# Patient Record
Sex: FEMALE | Race: BLACK OR AFRICAN AMERICAN | NOT HISPANIC OR LATINO | Employment: OTHER | URBAN - METROPOLITAN AREA
[De-identification: names, ages, dates, MRNs, and addresses within clinical notes are randomized per-mention and may not be internally consistent; named-entity substitution may affect disease eponyms.]

---

## 2017-03-22 ENCOUNTER — OFFICE VISIT (OUTPATIENT)
Dept: INTERNAL MEDICINE | Facility: CLINIC | Age: 62
End: 2017-03-22

## 2017-03-22 VITALS
TEMPERATURE: 98.2 F | OXYGEN SATURATION: 99 % | WEIGHT: 158 LBS | SYSTOLIC BLOOD PRESSURE: 130 MMHG | HEIGHT: 63 IN | HEART RATE: 70 BPM | DIASTOLIC BLOOD PRESSURE: 80 MMHG | BODY MASS INDEX: 28 KG/M2

## 2017-03-22 DIAGNOSIS — M79.604 LEG PAIN, BILATERAL: Primary | ICD-10-CM

## 2017-03-22 DIAGNOSIS — M79.605 LEG PAIN, BILATERAL: Primary | ICD-10-CM

## 2017-03-22 DIAGNOSIS — M79.7 FIBROMYALGIA: ICD-10-CM

## 2017-03-22 DIAGNOSIS — R39.9 UTI SYMPTOMS: Primary | ICD-10-CM

## 2017-03-22 DIAGNOSIS — R39.9 UTI SYMPTOMS: ICD-10-CM

## 2017-03-22 LAB
BILIRUB BLD-MCNC: NEGATIVE MG/DL
CLARITY, POC: CLEAR
COLOR UR: YELLOW
GLUCOSE UR STRIP-MCNC: NEGATIVE MG/DL
KETONES UR QL: NEGATIVE
LEUKOCYTE EST, POC: NEGATIVE
NITRITE UR-MCNC: NEGATIVE MG/ML
PH UR: 7 [PH] (ref 5–8)
PROT UR STRIP-MCNC: NEGATIVE MG/DL
RBC # UR STRIP: ABNORMAL /UL
SP GR UR: 1.02 (ref 1–1.03)
UROBILINOGEN UR QL: NORMAL

## 2017-03-22 PROCEDURE — 81003 URINALYSIS AUTO W/O SCOPE: CPT | Performed by: INTERNAL MEDICINE

## 2017-03-22 PROCEDURE — 99214 OFFICE O/P EST MOD 30 MIN: CPT | Performed by: INTERNAL MEDICINE

## 2017-03-22 RX ORDER — PREGABALIN 75 MG/1
75 CAPSULE ORAL 3 TIMES DAILY
Qty: 90 CAPSULE | Refills: 3 | OUTPATIENT
Start: 2017-03-22 | End: 2018-03-13 | Stop reason: SDUPTHER

## 2017-03-22 RX ORDER — MELOXICAM 7.5 MG/1
7.5 TABLET ORAL DAILY
Qty: 10 TABLET | Refills: 0 | Status: SHIPPED | OUTPATIENT
Start: 2017-03-22 | End: 2017-04-01

## 2017-03-22 NOTE — PROGRESS NOTES
"neck, back pain (bilateral leg aches, nausea ) and Urinary Tract Infection (odor, )      HPI  Bernice Dunlap is a 61 y.o. female RTC in acute care:   \"I just started hurting from my neck down to mid back\".  Pain is front and back.  Legs started hurting as well, diffusely and bilaterally.  Was at work, nothing acute occurred. No fall or trauma.  Pain is \"just an ache\" and is all over. Cannot point to one location.  Pain got severe. Tried antacid and seemed to help a bit and has not pain today.  Did not take antacid until Sunday and it did help some.  Was not having any overt heartburn. Notes \"it is not hurting today\".  Legs still hurt today, both legs. No weakness in legs.   Had to sit in meeting at work yesterday for 1 1/2 hours and was \"almost in tears\".  Took Tylenol and did help.  Did start to take Lyrica BID in last few days as was only taking once daily (pt did not initially admit this not \"not change\" in any of her meds). No other new med issues.     Also notes that urine \"is more yellow than normal\", darker than normal.  Has an odor to it, \"not a bad odor\".  No pain with urination.  Feels like has some mild frequency, but admits occasionally has some coffee that may contribute.     No ETOH, no drugs at all.   Was doing a lot of walking, but less in last few days due to pain.    Feels like stress level is pretty good, no major stress issues.       Review of Systems   Constitution: Negative for chills and fever.   HENT: Negative for congestion, hoarse voice, odynophagia and sore throat.    Respiratory: Negative for cough.    Skin: Positive for rash (exema on hands is back, NO other rashes noted).   Musculoskeletal: Positive for back pain and joint pain. Negative for falls, joint swelling and muscle weakness.   Gastrointestinal: Negative for dysphagia and heartburn.   Genitourinary: Positive for frequency (mild, coffee induced). Negative for dysuria and hematuria.   Neurological: Negative for focal weakness. " "      The following portions of the patient's history were reviewed and updated as appropriate: allergies, current medications, past medical history and problem list.      Current Outpatient Prescriptions:   •  ASPIRIN PO, Take by mouth., Disp: , Rfl:   •  cholecalciferol (VITAMIN D3) 1000 UNITS tablet, Take 1 tablet by mouth Daily., Disp: 30 tablet, Rfl: 5  •  metoprolol succinate XL (TOPROL-XL) 50 MG 24 hr tablet, Take 1 tablet by mouth daily., Disp: 90 tablet, Rfl: 3  •  Pitavastatin Calcium 4 MG tablet, Take 1 tablet by mouth every night., Disp: 90 tablet, Rfl: 3  •  pregabalin (LYRICA) 75 MG capsule, Take 1 capsule by mouth 3 (Three) Times a Day., Disp: 90 capsule, Rfl: 3  •  vitamin D (ERGOCALCIFEROL) 14670 UNITS capsule capsule, Take 1 capsule by mouth 1 (One) Time Per Week., Disp: 12 capsule, Rfl: 0  •  meloxicam (MOBIC) 7.5 MG tablet, Take 1 tablet by mouth Daily for 10 days., Disp: 10 tablet, Rfl: 0    Vitals:    03/22/17 0832   BP: 130/80   Pulse: 70   Temp: 98.2 °F (36.8 °C)   SpO2: 99%   Weight: 158 lb (71.7 kg)   Height: 63\" (160 cm)         Physical Exam   Constitutional: She is oriented to person, place, and time. She appears well-developed and well-nourished. She does not have a sickly appearance. She does not appear ill. No distress.   HENT:   Head: Normocephalic and atraumatic.   Mouth/Throat: Oropharynx is clear and moist. No oropharyngeal exudate.   Eyes: EOM are normal. Pupils are equal, round, and reactive to light.   Neck: Normal range of motion. Neck supple.   Cardiovascular: Normal rate, regular rhythm, normal heart sounds and intact distal pulses.  Exam reveals no gallop and no friction rub.    No murmur heard.  Pulmonary/Chest: Effort normal and breath sounds normal. No tachypnea. She has no decreased breath sounds. She has no wheezes. She has no rhonchi. She exhibits tenderness (over sternum with noted keloid).   Abdominal: Soft. Bowel sounds are normal. She exhibits no distension and no " mass. There is no tenderness. There is no rebound and no guarding.   Musculoskeletal: She exhibits no edema.        Right knee: She exhibits normal range of motion and no swelling.        Left knee: She exhibits normal range of motion and no swelling.   Diffuse TTP in muscles over thighs and calves B  Tender point noted at B lateral epicondyles, B upper back at neck, B medial knees, sternum   Neurological: She is alert and oriented to person, place, and time. No cranial nerve deficit. She exhibits normal muscle tone. Gait (walks easily unassisted through office carrying purse. ) normal.   Skin: No rash noted.   Psychiatric: She has a normal mood and affect. Her behavior is normal. Thought content normal.   Vitals reviewed.      Results for orders placed or performed in visit on 03/22/17   POC Urinalysis Dipstick, Automated   Result Value Ref Range    Color Yellow Yellow, Straw, Dark Yellow, Shaista    Clarity, UA Clear Clear    Glucose, UA Negative Negative, 1000 mg/dL (3+) mg/dL    Bilirubin Negative Negative    Ketones, UA Negative Negative    Specific Gravity  1.025 1.005 - 1.030    Blood, UA Trace (A) Negative    pH, Urine 7.0 5.0 - 8.0    Protein, POC Negative Negative mg/dL    Urobilinogen, UA Normal Normal    Leukocytes Negative Negative    Nitrite, UA Negative Negative       Assessment/ Plan  Diagnoses and all orders for this visit:    Leg pain, bilateral  -     meloxicam (MOBIC) 7.5 MG tablet; Take 1 tablet by mouth Daily for 10 days.  -     CK  -     CBC & Differential  -     Comprehensive Metabolic Panel  -     C-reactive Protein  -     Sedimentation Rate    UTI symptoms  -     POC Urinalysis Dipstick, Automated  -     Urinalysis With Microscopic    Fibromyalgia  -     pregabalin (LYRICA) 75 MG capsule; Take 1 capsule by mouth 3 (Three) Times a Day.  -     CK  -     CBC & Differential  -     Comprehensive Metabolic Panel  -     C-reactive Protein  -     Sedimentation Rate        Return for Next scheduled  follow up.      Discussion:  Bernice Dunlap is a 61 y.o. female RTC In acute care with sudden onset of neck to mid thoracic chest and back pain along with B diffuse leg pain.  Pain and exam with noted tender points is more consistent with flare of fibromyalgia, though pt cannot determine inciting event.  Pt complaints are a bit out of proportion to exam today which does support more of myofascial pain etiology.  I have asked pt to increase her Lyrica to TID at this point and will give her a short course of low dose daily NSAID to see if we can get her on the downside of pain pattern. Pt must restart graded exercise as she was doing. Will check above labs to look for secondary causes of sx.    Urine odor - non-specific sx. Udip equivocal. Will send out microscopy today with culture.      RTC as scehduled.

## 2017-03-24 LAB
ALBUMIN SERPL-MCNC: 4.3 G/DL (ref 3.5–5.2)
ALBUMIN/GLOB SERPL: 1.5 G/DL
ALP SERPL-CCNC: 66 U/L (ref 39–117)
ALT SERPL-CCNC: 12 U/L (ref 1–33)
AST SERPL-CCNC: 16 U/L (ref 1–32)
BACTERIA UR CULT: NO GROWTH
BACTERIA UR CULT: NORMAL
BASOPHILS # BLD AUTO: 0.03 10*3/MM3 (ref 0–0.2)
BASOPHILS NFR BLD AUTO: 0.5 % (ref 0–1.5)
BILIRUB SERPL-MCNC: 0.5 MG/DL (ref 0.1–1.2)
BUN SERPL-MCNC: 14 MG/DL (ref 8–23)
BUN/CREAT SERPL: 25.5 (ref 7–25)
CALCIUM SERPL-MCNC: 10 MG/DL (ref 8.6–10.5)
CHLORIDE SERPL-SCNC: 100 MMOL/L (ref 98–107)
CK SERPL-CCNC: 102 U/L (ref 20–180)
CO2 SERPL-SCNC: 26.5 MMOL/L (ref 22–29)
CREAT SERPL-MCNC: 0.55 MG/DL (ref 0.57–1)
CRP SERPL-MCNC: 0.51 MG/DL (ref 0–0.5)
EOSINOPHIL # BLD AUTO: 0.16 10*3/MM3 (ref 0–0.7)
EOSINOPHIL NFR BLD AUTO: 2.6 % (ref 0.3–6.2)
ERYTHROCYTE [DISTWIDTH] IN BLOOD BY AUTOMATED COUNT: 12.5 % (ref 11.7–13)
ERYTHROCYTE [SEDIMENTATION RATE] IN BLOOD BY WESTERGREN METHOD: 18 MM/HR (ref 0–30)
GLOBULIN SER CALC-MCNC: 2.9 GM/DL
GLUCOSE SERPL-MCNC: 76 MG/DL (ref 65–99)
HCT VFR BLD AUTO: 39.6 % (ref 35.6–45.5)
HGB BLD-MCNC: 13.4 G/DL (ref 11.9–15.5)
IMM GRANULOCYTES # BLD: 0 10*3/MM3 (ref 0–0.03)
IMM GRANULOCYTES NFR BLD: 0 % (ref 0–0.5)
LYMPHOCYTES # BLD AUTO: 3.01 10*3/MM3 (ref 0.9–4.8)
LYMPHOCYTES NFR BLD AUTO: 49.2 % (ref 19.6–45.3)
MCH RBC QN AUTO: 29.9 PG (ref 26.9–32)
MCHC RBC AUTO-ENTMCNC: 33.8 G/DL (ref 32.4–36.3)
MCV RBC AUTO: 88.4 FL (ref 80.5–98.2)
MONOCYTES # BLD AUTO: 0.41 10*3/MM3 (ref 0.2–1.2)
MONOCYTES NFR BLD AUTO: 6.7 % (ref 5–12)
NEUTROPHILS # BLD AUTO: 2.51 10*3/MM3 (ref 1.9–8.1)
NEUTROPHILS NFR BLD AUTO: 41 % (ref 42.7–76)
PLATELET # BLD AUTO: 352 10*3/MM3 (ref 140–500)
POTASSIUM SERPL-SCNC: 4.7 MMOL/L (ref 3.5–5.2)
PROT SERPL-MCNC: 7.2 G/DL (ref 6–8.5)
RBC # BLD AUTO: 4.48 10*6/MM3 (ref 3.9–5.2)
SODIUM SERPL-SCNC: 139 MMOL/L (ref 136–145)
WBC # BLD AUTO: 6.12 10*3/MM3 (ref 4.5–10.7)

## 2017-06-23 ENCOUNTER — OFFICE VISIT (OUTPATIENT)
Dept: CARDIOLOGY | Facility: CLINIC | Age: 62
End: 2017-06-23

## 2017-06-23 VITALS
WEIGHT: 154 LBS | SYSTOLIC BLOOD PRESSURE: 120 MMHG | HEART RATE: 67 BPM | OXYGEN SATURATION: 98 % | HEIGHT: 63 IN | DIASTOLIC BLOOD PRESSURE: 82 MMHG | BODY MASS INDEX: 27.29 KG/M2

## 2017-06-23 DIAGNOSIS — Z95.1 S/P CABG X 1: ICD-10-CM

## 2017-06-23 DIAGNOSIS — I70.90 ARTERIOSCLEROTIC VASCULAR DISEASE: Primary | ICD-10-CM

## 2017-06-23 PROCEDURE — 99213 OFFICE O/P EST LOW 20 MIN: CPT | Performed by: PHYSICIAN ASSISTANT

## 2017-06-23 PROCEDURE — 93000 ELECTROCARDIOGRAM COMPLETE: CPT | Performed by: PHYSICIAN ASSISTANT

## 2017-06-23 NOTE — PROGRESS NOTES
Date of Office Visit: 2017  Encounter Provider: JUSTYNA Martin  Place of Service: [facility]  Patient Name: Bernice Dunlap  :1955    Chief Complaint   Patient presents with   • Hypertension     1 year follow up   :     HPI: Bernice Dunlap is a 62 y.o. female , new to me, who presents today for follow-up.  Old records have been obtained and reviewed by me.  She is a patient of Dr. Contreras's with a past medical history significant for hyperlipidemia, hypertension, carotid artery disease, and coronary artery disease.  She had a myocardial infarction in  and is status post CABG ×1 and 2008.  She was last in our office to see Dr. Contreras on 2016.  At that visit, she complained of some atypical chest pain.  Because of her complaints, he had her walk on a treadmill and she developed severe chest discomfort.  He then ordered a Cardiolite stress test.  This was a normal study with no evidence of ischemia.   Over the past year she's been doing well.  She had an episode a couple of weeks ago that she thinks was heartburn.  She drank a Coke and then felt a pressure in her chest like she had to burp.  That night, she had some burning in her chest, and all of this resolved by the next day.  She walks regularly without difficulty.  She does have some fibromyalgia, and this is bothersome to her.      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 Mammary artery to the LAD; 2007; nl stress in 2014   • HYSTERECTOMY     • SALPINGO OOPHORECTOMY Bilateral    • THORACOSCOPY      With Pleurodesis (Therapeutic) - 2012 after traumatic effusion; Dr. Tomas       Social History     Social History   • Marital status:      Spouse name: N/A   • Number of children: 1   • Years of education: N/A     Occupational History   • Not on file.     Social History Main Topics   • Smoking status: Former Smoker   • Smokeless tobacco: Not on file      Comment: 6 pk/yr hx; occasionally smokes now; 2015 has quit totally   • Alcohol use No   • Drug use: No   • Sexual activity: No      Comment:  in past     Other Topics Concern   • Not on file     Social History Narrative    Diet - overall healthy, \"a little bit better\", eating fruits and veggies    Exercise - walking daily.        Family History   Problem Relation Age of Onset   • Heart disease Mother      Arteriosclerotic cardiovascular disease; dx'd in 50's   • Alcohol abuse Father      Alcoholism   • COPD Father    • Depression Sister    • Leukemia Sister    • Diabetes Sister      Type 2 Diabetes Mellitus   • Alcohol abuse Brother      Alcoholism   • Hypertension Brother      Benign Essential Hypertension   • Glaucoma Brother    • Hyperlipidemia Brother    • No Known Problems Maternal Grandmother    • No Known Problems Maternal Grandfather    • No Known Problems Paternal Grandmother    • No Known Problems Paternal Grandfather        Review of Systems   Constitution: Negative for chills, fever, malaise/fatigue, weight gain and weight loss.   HENT: Negative for ear pain, headaches, hearing loss, nosebleeds and sore throat.    Eyes: Negative for double vision, pain and visual " "disturbance.   Cardiovascular: Negative for chest pain, dyspnea on exertion, irregular heartbeat, leg swelling, near-syncope, orthopnea, palpitations, paroxysmal nocturnal dyspnea and syncope.   Respiratory: Negative for cough, shortness of breath, sleep disturbances due to breathing, snoring and wheezing.    Endocrine: Negative for cold intolerance, heat intolerance and polyuria.   Skin: Negative for itching and rash.   Musculoskeletal: Positive for myalgias. Negative for joint pain and joint swelling.   Gastrointestinal: Negative for abdominal pain, diarrhea, melena, nausea and vomiting.   Genitourinary: Negative for frequency, hematuria and hesitancy.   Neurological: Negative for excessive daytime sleepiness, light-headedness, numbness, paresthesias and seizures.   Psychiatric/Behavioral: Negative for altered mental status and depression.   Allergic/Immunologic: Negative.    All other systems reviewed and are negative.      Allergies   Allergen Reactions   • Sumatriptan          Current Outpatient Prescriptions:   •  ASPIRIN PO, Take 81 mg by mouth Daily., Disp: , Rfl:   •  metoprolol succinate XL (TOPROL-XL) 50 MG 24 hr tablet, Take 1 tablet by mouth daily., Disp: 90 tablet, Rfl: 3  •  Pitavastatin Calcium 4 MG tablet, Take 1 tablet by mouth every night., Disp: 90 tablet, Rfl: 3  •  pregabalin (LYRICA) 75 MG capsule, Take 1 capsule by mouth 3 (Three) Times a Day., Disp: 90 capsule, Rfl: 3     Objective:     Vitals:    06/23/17 1411 06/23/17 1426   BP: 122/78 120/82   BP Location: Right arm Left arm   Pulse: 67    SpO2: 98%    Weight: 154 lb (69.9 kg)    Height: 63\" (160 cm)      Body mass index is 27.28 kg/(m^2).    PHYSICAL EXAM:    Physical Exam   Constitutional: She is oriented to person, place, and time. She appears well-developed and well-nourished. No distress.   HENT:   Head: Normocephalic and atraumatic.   Eyes: Pupils are equal, round, and reactive to light.   Neck: No JVD present. No thyromegaly " present.   Cardiovascular: Normal rate, regular rhythm, normal heart sounds and intact distal pulses.    No murmur heard.  Pulmonary/Chest: Effort normal and breath sounds normal. No respiratory distress.   Abdominal: Soft. Bowel sounds are normal. She exhibits no distension. There is no splenomegaly or hepatomegaly. There is no tenderness.   Musculoskeletal: Normal range of motion. She exhibits no edema.   Neurological: She is alert and oriented to person, place, and time.   Skin: Skin is warm and dry. She is not diaphoretic. No erythema.   Psychiatric: She has a normal mood and affect. Her behavior is normal. Judgment normal.         ECG 12 Lead  Date/Time: 6/23/2017 2:35 PM  Performed by: ALLEN PRETTY.  Authorized by: ALLEN PRETTY   Comparison: compared with previous ECG from 4/18/2016  Similar to previous ECG  Rhythm: sinus rhythm  BPM: 67  Clinical impression: normal ECG  Comments: Indication: Coronary artery disease, status post CABG.              Assessment:       Diagnosis Plan   1. Arteriosclerotic vascular disease  ECG 12 Lead   2. S/P CABG x 1  ECG 12 Lead     Orders Placed This Encounter   Procedures   • ECG 12 Lead     This order was created via procedure documentation          Plan:       1.  Coronary Artery Disease  Assessment  • The patient has no angina    Plan  • Lifestyle modifications discussed include adhering to a heart healthy diet and regular exercise    Subjective - Objective  • There is a history of previous coronary artery bypass graft  • Current antiplatelet therapy includes aspirin 81 mg  • Overall she is doing well.  I think that she most likely had a little episode of reflux that caused her pain a few weeks ago, it does not sound cardiac.  She is walking regularly.  I'm not going to make any changes to her medical regimen today, and she will follow-up with Dr. Contreras in 1 year or sooner if needed.      As always, it has been a pleasure to participate in your patient's  care.      Sincerely,         Tata Trujillo PA-C

## 2017-08-08 ENCOUNTER — OFFICE VISIT (OUTPATIENT)
Dept: INTERNAL MEDICINE | Facility: CLINIC | Age: 62
End: 2017-08-08

## 2017-08-08 VITALS
HEART RATE: 73 BPM | HEIGHT: 63 IN | SYSTOLIC BLOOD PRESSURE: 130 MMHG | DIASTOLIC BLOOD PRESSURE: 88 MMHG | TEMPERATURE: 98.6 F | OXYGEN SATURATION: 98 % | BODY MASS INDEX: 28.17 KG/M2 | WEIGHT: 159 LBS

## 2017-08-08 DIAGNOSIS — K64.8 INTERNAL BLEEDING HEMORRHOIDS: Primary | ICD-10-CM

## 2017-08-08 PROCEDURE — 99213 OFFICE O/P EST LOW 20 MIN: CPT | Performed by: INTERNAL MEDICINE

## 2017-08-08 NOTE — PROGRESS NOTES
"Rectal Bleeding      HPI  Bernice Dunlap is a 62 y.o. female RTC In acute care:   Notes has been seeing blood in stool off and on for last month. Started one month ago noting a \"wet feeling\" on bottom and went to bathroom and had BM. Noted bright red blood on toilet paper. \"I ignored it\".  No pain at time of event.  No issues again until over weekend when had recurrence of event and then recurred again today. \"I figured I ought to do something about it\".  Pt notes stool is brown and only seen BRB on TP.  \"The more I wipe, the more it comes\". However, each event, pt has gotten bleeding to stop.  Notes only occasional straining with BM's and denies overt constipation.  No abdominal pain.  Had some mild nasuea this AM, but is resolved and pt does not think it related to bleeding. No vaginal or urinary blood noted.       Review of Systems   Constitution: Negative for chills and fever.   Gastrointestinal: Positive for constipation (mild, occasional) and nausea (x 1 this AM, resolved. ). Negative for abdominal pain, diarrhea, melena and vomiting.   Genitourinary: Negative for dysuria, hematuria, non-menstrual bleeding and pelvic pain.       The following portions of the patient's history were reviewed and updated as appropriate: allergies, current medications, past medical history and problem list.      Current Outpatient Prescriptions:   •  ASPIRIN PO, Take 81 mg by mouth Daily., Disp: , Rfl:   •  metoprolol succinate XL (TOPROL-XL) 50 MG 24 hr tablet, Take 1 tablet by mouth daily., Disp: 90 tablet, Rfl: 3  •  Pitavastatin Calcium 4 MG tablet, Take 1 tablet by mouth every night., Disp: 90 tablet, Rfl: 3  •  pregabalin (LYRICA) 75 MG capsule, Take 1 capsule by mouth 3 (Three) Times a Day., Disp: 90 capsule, Rfl: 3  •  psyllium (METAMUCIL) 58.6 % powder, Take  by mouth Daily., Disp: , Rfl: 12    Vitals:    08/08/17 1307   BP: 130/88   Pulse: 73   Temp: 98.6 °F (37 °C)   SpO2: 98%   Weight: 159 lb (72.1 kg)   Height: 63\" " "(160 cm)         Physical Exam   Constitutional: She is oriented to person, place, and time. She appears well-developed and well-nourished. No distress.   HENT:   Head: Normocephalic and atraumatic.   Pulmonary/Chest: Effort normal. No respiratory distress. She has no decreased breath sounds. She has no wheezes. She has no rhonchi. She has no rales.   Abdominal: There is no CVA tenderness.   Genitourinary: Rectal exam shows internal hemorrhoid (small hemorrhoids internally, able to visualize partially with digital opening of anal canal.  Site of recent bleeding  noted on single hemorrhoid, no pain or actively bleeding noted  on exam. ). Rectal exam shows no external hemorrhoid, no fissure, no tenderness and anal tone normal. Pelvic exam was performed with patient prone. There is no rash or lesion on the right labia. There is no rash or lesion on the left labia.   Genitourinary Comments: Chaperone present for exam.    Musculoskeletal:        Lumbar back: She exhibits tenderness (mild, to L of midline, \"always there\" per pt. ).   Neurological: She is alert and oriented to person, place, and time. No cranial nerve deficit.   Psychiatric: She has a normal mood and affect. Her behavior is normal.   Vitals reviewed.      Assessment/ Plan  Diagnoses and all orders for this visit:    Internal bleeding hemorrhoids  -     psyllium (METAMUCIL) 58.6 % powder; Take  by mouth Daily.        No Follow-up on file.      Discussion:  Bernice Dunlap is a 62 y.o. female RTC In acute care with three events of BRBPR noted on TP, small volume bleeding. Pt has evidence of bleeding site on small internal hemorrhoid on exam today with no anal fissure noted and no pain.  I suspect this is constipation related as pt admits to some straining with BM's. I have asked pt to start Sitz Baths daily at this time.  Add Metamucil orally daily.  OK to use PRN Prep-H OTC if any itching.  Will hold on colorectal eval at this time unless bleeding continues " despite above tx; pt agrees to call.   Note, last C-scope 2010 (-) --> due 2020.

## 2017-09-20 DIAGNOSIS — I10 BENIGN ESSENTIAL HYPERTENSION: ICD-10-CM

## 2017-09-20 RX ORDER — METOPROLOL SUCCINATE 50 MG/1
TABLET, EXTENDED RELEASE ORAL
Qty: 90 TABLET | Refills: 0 | Status: SHIPPED | OUTPATIENT
Start: 2017-09-20 | End: 2018-01-16 | Stop reason: SDUPTHER

## 2017-10-10 ENCOUNTER — OFFICE VISIT (OUTPATIENT)
Dept: INTERNAL MEDICINE | Facility: CLINIC | Age: 62
End: 2017-10-10

## 2017-10-10 VITALS
BODY MASS INDEX: 28.7 KG/M2 | SYSTOLIC BLOOD PRESSURE: 130 MMHG | OXYGEN SATURATION: 98 % | DIASTOLIC BLOOD PRESSURE: 90 MMHG | TEMPERATURE: 98.2 F | HEIGHT: 63 IN | HEART RATE: 87 BPM | WEIGHT: 162 LBS

## 2017-10-10 DIAGNOSIS — F41.9 ANXIETY: ICD-10-CM

## 2017-10-10 DIAGNOSIS — Z00.00 HEALTHCARE MAINTENANCE: ICD-10-CM

## 2017-10-10 DIAGNOSIS — I70.90 ARTERIOSCLEROTIC VASCULAR DISEASE: ICD-10-CM

## 2017-10-10 DIAGNOSIS — G47.09 OTHER INSOMNIA: Primary | ICD-10-CM

## 2017-10-10 PROCEDURE — 90471 IMMUNIZATION ADMIN: CPT | Performed by: INTERNAL MEDICINE

## 2017-10-10 PROCEDURE — 90656 IIV3 VACC NO PRSV 0.5 ML IM: CPT | Performed by: INTERNAL MEDICINE

## 2017-10-10 PROCEDURE — 99214 OFFICE O/P EST MOD 30 MIN: CPT | Performed by: INTERNAL MEDICINE

## 2017-10-10 RX ORDER — TRAZODONE HYDROCHLORIDE 50 MG/1
50 TABLET ORAL NIGHTLY
Qty: 30 TABLET | Refills: 5 | Status: SHIPPED | OUTPATIENT
Start: 2017-10-10 | End: 2018-10-24 | Stop reason: SDUPTHER

## 2017-10-10 NOTE — PROGRESS NOTES
"trouble sleeping; Anxiety; and Depression      HPI  Bernice Dunlap is a 62 y.o. female RTC In acute care:   2-3 weeks ago started with trouble sleeping.  \"that was the start of it\".  Feels like is up and down through night and cannot relax.  Feels like is \"all over the place at... And at work\".  No events to bring this on, no stressful family events or work events.  Lives alone now and has no disturbances to sleep.     Had a \"crying spell\"  Yesterday, \"it just came on\".    Last time got a good nights sleep a few weeks ago.   Does not feel like hurting self or others.  Pt notes she would never commit suicide, \"that would not be true'.   Notes has been \"I have been down, but nothing like this\".  Worries about if will sleep at night. Never taken meds for mood in the past.   A few days ago had HA all day, but resolved.   No panic attacks.   Bought some melatonin but it did not help.  No other new OTC meds. No change in Rx meds at all.   No flare of fibromyalgia pain. Feels well controlled there and notes \"I dont think I have had a flare up in a while\".     Sleep: will drink warm milk prior to bed.  Tried yogurt. Goes to bed 1AM typically.  Gets to sleep with effort, will toss and turn for a while. Will wake around 3-4 AM and then cannot get back to sleep. Looks at phone often 30 minutes prior to bed or cooking right before bed.  When wakes at 4AM will get up and get drink of water or sit on couch, then will come back in and try to get to sleep. \"I just worry most nights, will I get to sleep\".    Thinks that would have answered PQ-9 and DONNELL 7 the same for long period of time \"but just really did not think about it\" until sleep was off. \"I dont know how long I have been kind of having a bad mood\".            Review of Systems   Constitution: Negative for chills and fever.   HENT: Negative for congestion, hearing loss and sore throat.    Eyes: Positive for blurred vision (has new glasses, thinks accounts for vision issues. " "). Negative for vision loss in left eye and vision loss in right eye.        Saw optho a few months ago.     Cardiovascular: Negative for chest pain, dyspnea on exertion, irregular heartbeat, near-syncope, palpitations and syncope.   Respiratory: Negative for cough and shortness of breath.    Neurological: Positive for dizziness (\"all the time\", is not new. ) and vertigo (one day at work, very brief and fleeing; No recurrence. No falls. ). Negative for light-headedness.       The following portions of the patient's history were reviewed and updated as appropriate: allergies, current medications, past medical history and problem list.      Current Outpatient Prescriptions:   •  ASPIRIN PO, Take 81 mg by mouth Daily., Disp: , Rfl:   •  metoprolol succinate XL (TOPROL-XL) 50 MG 24 hr tablet, TAKE 1 TABLET BY MOUTH DAILY, Disp: 90 tablet, Rfl: 0  •  pregabalin (LYRICA) 75 MG capsule, Take 1 capsule by mouth 3 (Three) Times a Day., Disp: 90 capsule, Rfl: 3  •  psyllium (METAMUCIL) 58.6 % powder, Take  by mouth Daily., Disp: , Rfl: 12  •  Pitavastatin Calcium 4 MG tablet, Take 1 tablet by mouth Every Night., Disp: 90 tablet, Rfl: 3  •  traZODone (DESYREL) 50 MG tablet, Take 1 tablet by mouth Every Night., Disp: 30 tablet, Rfl: 5    Vitals:    10/10/17 1446   BP: 130/90   Pulse: 87   Temp: 98.2 °F (36.8 °C)   SpO2: 98%   Weight: 162 lb (73.5 kg)   Height: 63\" (160 cm)         Physical Exam   Constitutional: She is oriented to person, place, and time. She appears well-developed and well-nourished. No distress.   HENT:   Head: Normocephalic and atraumatic.   Mouth/Throat: Oropharynx is clear and moist. No oropharyngeal exudate.   Large keloids noted on hard palate.      Eyes: Pupils are equal, round, and reactive to light.   Neck: Normal range of motion. Neck supple. No thyromegaly present.   Cardiovascular: Normal rate, regular rhythm and normal heart sounds.    Pulmonary/Chest: Effort normal and breath sounds normal. No " respiratory distress. She has no wheezes. She has no rales.   Musculoskeletal: She exhibits no edema.   Lymphadenopathy:     She has no cervical adenopathy.   Neurological: She is alert and oriented to person, place, and time. She displays no tremor. No cranial nerve deficit. She exhibits normal muscle tone. Gait normal.   Psychiatric: She has a normal mood and affect. Her behavior is normal. Her mood appears not anxious. Her affect is not angry, not blunt, not labile and not inappropriate. Her speech is not rapid and/or pressured, not delayed, not tangential and not slurred. She is not agitated, not slowed and not withdrawn. Thought content is not paranoid and not delusional. Cognition and memory are not impaired. She does not exhibit a depressed mood. She expresses no suicidal plans and no homicidal plans. She is communicative.   Vitals reviewed.      Assessment/ Plan  Diagnoses and all orders for this visit:    Other insomnia  -     traZODone (DESYREL) 50 MG tablet; Take 1 tablet by mouth Every Night.  -     CBC & Differential  -     Comprehensive Metabolic Panel  -     Vitamin B12  -     TSH    Anxiety  -     CBC & Differential  -     Comprehensive Metabolic Panel  -     Vitamin B12  -     TSH    Healthcare maintenance  -     Flu Vaccine Quad PF >18YR        Return in about 3 weeks (around 10/31/2017) for Recheck.      Discussion:  Bernice Dunlap is a 62 y.o. female RTC in acute care (new issue to examiner) with 2-3 weeks of sudden onset insomnia after unspecified period of time with anxious and depressed mood. Pt clearly denies SI or HI today.  Interestingly, pt has very high GAD7 and PHQ9 scores today that are out of proportion to her appearance of mood and demeanor in conversation today.  I am not sure if this mood alteration is insomnia related or if there is some secondary issue that is leading to mood.  I think our first goal today is getting good sleep.  I have asked pt to c/w melatonin nightly and add  trazodone Rx nightly.  We discussed sleep hygiene in detail today and highlighted need to stop phone use and TV in bedroom one hour prior to bed.  Will complete labs today to eval for secondary causes.  Will f/u pt in short interval to see if improving sleep will improve mood. We may need to consider SSRI addition if mood sx score remain elevated.    HM - flu shot today    RTC 3 weeks.

## 2017-10-11 LAB
ALBUMIN SERPL-MCNC: 4.3 G/DL (ref 3.5–5.2)
ALBUMIN/GLOB SERPL: 1.5 G/DL
ALP SERPL-CCNC: 69 U/L (ref 39–117)
ALT SERPL-CCNC: 13 U/L (ref 1–33)
AST SERPL-CCNC: 15 U/L (ref 1–32)
BASOPHILS # BLD AUTO: 0.01 10*3/MM3 (ref 0–0.2)
BASOPHILS NFR BLD AUTO: 0.2 % (ref 0–1.5)
BILIRUB SERPL-MCNC: 0.2 MG/DL (ref 0.1–1.2)
BUN SERPL-MCNC: 12 MG/DL (ref 8–23)
BUN/CREAT SERPL: 18.8 (ref 7–25)
CALCIUM SERPL-MCNC: 9.7 MG/DL (ref 8.6–10.5)
CHLORIDE SERPL-SCNC: 101 MMOL/L (ref 98–107)
CO2 SERPL-SCNC: 25.8 MMOL/L (ref 22–29)
CREAT SERPL-MCNC: 0.64 MG/DL (ref 0.57–1)
EOSINOPHIL # BLD AUTO: 0.16 10*3/MM3 (ref 0–0.7)
EOSINOPHIL NFR BLD AUTO: 2.6 % (ref 0.3–6.2)
ERYTHROCYTE [DISTWIDTH] IN BLOOD BY AUTOMATED COUNT: 12.5 % (ref 11.7–13)
GLOBULIN SER CALC-MCNC: 2.9 GM/DL
GLUCOSE SERPL-MCNC: 86 MG/DL (ref 65–99)
HCT VFR BLD AUTO: 39 % (ref 35.6–45.5)
HGB BLD-MCNC: 13.3 G/DL (ref 11.9–15.5)
IMM GRANULOCYTES # BLD: 0 10*3/MM3 (ref 0–0.03)
IMM GRANULOCYTES NFR BLD: 0 % (ref 0–0.5)
LYMPHOCYTES # BLD AUTO: 3.31 10*3/MM3 (ref 0.9–4.8)
LYMPHOCYTES NFR BLD AUTO: 53 % (ref 19.6–45.3)
MCH RBC QN AUTO: 30.4 PG (ref 26.9–32)
MCHC RBC AUTO-ENTMCNC: 34.1 G/DL (ref 32.4–36.3)
MCV RBC AUTO: 89.2 FL (ref 80.5–98.2)
MONOCYTES # BLD AUTO: 0.45 10*3/MM3 (ref 0.2–1.2)
MONOCYTES NFR BLD AUTO: 7.2 % (ref 5–12)
NEUTROPHILS # BLD AUTO: 2.32 10*3/MM3 (ref 1.9–8.1)
NEUTROPHILS NFR BLD AUTO: 37 % (ref 42.7–76)
PLATELET # BLD AUTO: 369 10*3/MM3 (ref 140–500)
POTASSIUM SERPL-SCNC: 4.3 MMOL/L (ref 3.5–5.2)
PROT SERPL-MCNC: 7.2 G/DL (ref 6–8.5)
RBC # BLD AUTO: 4.37 10*6/MM3 (ref 3.9–5.2)
SODIUM SERPL-SCNC: 139 MMOL/L (ref 136–145)
TSH SERPL DL<=0.005 MIU/L-ACNC: 0.87 MIU/ML (ref 0.27–4.2)
VIT B12 SERPL-MCNC: 449 PG/ML (ref 211–946)
WBC # BLD AUTO: 6.25 10*3/MM3 (ref 4.5–10.7)

## 2017-10-12 ENCOUNTER — TELEPHONE (OUTPATIENT)
Dept: INTERNAL MEDICINE | Facility: CLINIC | Age: 62
End: 2017-10-12

## 2017-10-12 RX ORDER — ROSUVASTATIN CALCIUM 20 MG/1
20 TABLET, COATED ORAL DAILY
Qty: 30 TABLET | Refills: 5 | Status: SHIPPED | OUTPATIENT
Start: 2017-10-12 | End: 2018-01-22 | Stop reason: DRUGHIGH

## 2017-10-12 NOTE — TELEPHONE ENCOUNTER
Changing from LIvalo to Crestor 20mg trial after insurance denied Livalo without trying THREE formulary alternative. Pt intolerant of Lipitor in past.  Will trend LFT's, CPK, and lipids at f/u visit.

## 2017-11-13 ENCOUNTER — OFFICE VISIT (OUTPATIENT)
Dept: INTERNAL MEDICINE | Facility: CLINIC | Age: 62
End: 2017-11-13

## 2017-11-13 VITALS
TEMPERATURE: 98.3 F | HEART RATE: 61 BPM | SYSTOLIC BLOOD PRESSURE: 130 MMHG | WEIGHT: 162 LBS | HEIGHT: 63 IN | DIASTOLIC BLOOD PRESSURE: 60 MMHG | OXYGEN SATURATION: 98 % | BODY MASS INDEX: 28.7 KG/M2

## 2017-11-13 DIAGNOSIS — M79.7 FIBROMYALGIA: ICD-10-CM

## 2017-11-13 DIAGNOSIS — G47.09 OTHER INSOMNIA: ICD-10-CM

## 2017-11-13 DIAGNOSIS — Z00.00 HEALTHCARE MAINTENANCE: Primary | ICD-10-CM

## 2017-11-13 DIAGNOSIS — R45.86 MOOD CHANGES: ICD-10-CM

## 2017-11-13 PROBLEM — R39.9 UTI SYMPTOMS: Status: RESOLVED | Noted: 2017-03-22 | Resolved: 2017-11-13

## 2017-11-13 PROCEDURE — 99213 OFFICE O/P EST LOW 20 MIN: CPT | Performed by: INTERNAL MEDICINE

## 2017-11-13 NOTE — PROGRESS NOTES
"Insomnia (follow up)      HPI  Bernice Dunlap is a 62 y.o. female RTC in short interval f/u:    Notes that is doing better.  Has started Trazodone nightly about one hour prior to bed., but notes \"I usually fall asleep on the cough\".  Will wake up 2 AM and then go to bed and \"that is when I really struggle to get to sleep\".  Will not have TV in bedroom. Does fall asleep to TV at night on couch.  Bedroom is quiet and dark.    Mood is \"pretty good\".  \"I was in a bad light before\".  Not sure what made the difference.  Admits that \"I worry about a lot of things I can do nothing about \".  Feels like carries weight of family stressors on her as she is the oldest in a large family.   Exercise some but not as much as wants to.  Pt is willing to walk even in cold weather, but has been backing away from it more recently.     Asks when last Mammo was.  Feels like is due for it.     Had flu shot last time in office.     Review of Systems   Respiratory: Negative for sleep disturbances due to breathing.    Musculoskeletal: Negative for joint pain and myalgias.   Neurological: Positive for excessive daytime sleepiness (at time, when does not sleep well).   Psychiatric/Behavioral: Negative for depression. The patient has insomnia (as noted in HPI, improving. ). The patient is not nervous/anxious.        The following portions of the patient's history were reviewed and updated as appropriate: allergies, current medications, past medical history and problem list.      Current Outpatient Prescriptions:   •  ASPIRIN PO, Take 81 mg by mouth Daily., Disp: , Rfl:   •  metoprolol succinate XL (TOPROL-XL) 50 MG 24 hr tablet, TAKE 1 TABLET BY MOUTH DAILY, Disp: 90 tablet, Rfl: 0  •  pregabalin (LYRICA) 75 MG capsule, Take 1 capsule by mouth 3 (Three) Times a Day., Disp: 90 capsule, Rfl: 3  •  psyllium (METAMUCIL) 58.6 % powder, Take  by mouth Daily., Disp: , Rfl: 12  •  rosuvastatin (CRESTOR) 20 MG tablet, Take 1 tablet by mouth Daily., Disp: " "30 tablet, Rfl: 5  •  traZODone (DESYREL) 50 MG tablet, Take 1 tablet by mouth Every Night., Disp: 30 tablet, Rfl: 5    Vitals:    11/13/17 0945   BP: 130/60   Pulse: 61   Temp: 98.3 °F (36.8 °C)   SpO2: 98%   Weight: 162 lb (73.5 kg)   Height: 63\" (160 cm)         Physical Exam   Constitutional: She is oriented to person, place, and time. She appears well-developed and well-nourished. No distress.   HENT:   Head: Normocephalic and atraumatic.   Cardiovascular: Normal rate, regular rhythm and normal heart sounds.    Pulmonary/Chest: Effort normal and breath sounds normal. No respiratory distress. She has no wheezes. She has no rales.   Neurological: She is alert and oriented to person, place, and time. No cranial nerve deficit. Gait normal.   Psychiatric: She has a normal mood and affect. Her behavior is normal. Thought content normal.   Vitals reviewed.      Assessment/ Plan  Diagnoses and all orders for this visit:    Healthcare maintenance  -     Mammo Screening Bilateral With CAD; Future    Other insomnia    Fibromyalgia    Mood changes        Return in about 2 months (around 1/13/2018) for Annual physical.      Discussion:  Bernice Dunlap is a 62 y.o. female with hx of fibromyalgia RTC in short interval f/u after recent visit for sudden onset insomnia and subsequent depressed mood.  Pt started Trazodone after last visit and noted sleep has been better, but still variable.  HOwever, pt admits today on further hx, despite extensive discussion last visit, that she is falling asleep on the cough most nights in front of TV and then will struggle to get to sleep when gets to bed around 2AM.  We had extensive discussion today regarding sleep hygiene.  Reviewed timing of Trazodone and need for modification of bedtime routine with complete cessation of falling asleep on couch.  Will see how pt does going forward with sleepy hygiene and c/w Trazodone for now until sleep patterns are better. Mood is much improved overall " today.  No flare of fibromyalgia sx noted.   HM - Flu shot UTD; Mammo overdue (reviewed record) and order placed.       RTC 2 months CPE, F labs prior

## 2018-01-11 DIAGNOSIS — Z00.00 HEALTHCARE MAINTENANCE: Primary | ICD-10-CM

## 2018-01-11 DIAGNOSIS — E55.9 VITAMIN D DEFICIENCY: ICD-10-CM

## 2018-01-11 DIAGNOSIS — R73.03 PRE-DIABETES: ICD-10-CM

## 2018-01-11 DIAGNOSIS — E78.2 MIXED HYPERLIPIDEMIA: ICD-10-CM

## 2018-01-11 DIAGNOSIS — I10 BENIGN ESSENTIAL HYPERTENSION: ICD-10-CM

## 2018-01-16 DIAGNOSIS — I10 BENIGN ESSENTIAL HYPERTENSION: ICD-10-CM

## 2018-01-16 RX ORDER — METOPROLOL SUCCINATE 50 MG/1
TABLET, EXTENDED RELEASE ORAL
Qty: 90 TABLET | Refills: 0 | Status: SHIPPED | OUTPATIENT
Start: 2018-01-16 | End: 2018-05-12 | Stop reason: SDUPTHER

## 2018-01-20 LAB
25(OH)D3+25(OH)D2 SERPL-MCNC: 15.1 NG/ML (ref 30–100)
ALBUMIN SERPL-MCNC: 4.5 G/DL (ref 3.5–5.2)
ALBUMIN/GLOB SERPL: 1.7 G/DL
ALP SERPL-CCNC: 72 U/L (ref 39–117)
ALT SERPL-CCNC: 15 U/L (ref 1–33)
APPEARANCE UR: CLEAR
AST SERPL-CCNC: 18 U/L (ref 1–32)
BACTERIA #/AREA URNS HPF: NORMAL /HPF
BASOPHILS # BLD AUTO: 0.01 10*3/MM3 (ref 0–0.2)
BASOPHILS NFR BLD AUTO: 0.2 % (ref 0–1.5)
BILIRUB SERPL-MCNC: 0.3 MG/DL (ref 0.1–1.2)
BILIRUB UR QL STRIP: NEGATIVE
BUN SERPL-MCNC: 15 MG/DL (ref 8–23)
BUN/CREAT SERPL: 23.8 (ref 7–25)
CALCIUM SERPL-MCNC: 9.4 MG/DL (ref 8.6–10.5)
CHLORIDE SERPL-SCNC: 100 MMOL/L (ref 98–107)
CHOLEST SERPL-MCNC: 174 MG/DL (ref 0–200)
CO2 SERPL-SCNC: 27.4 MMOL/L (ref 22–29)
COLOR UR: YELLOW
CREAT SERPL-MCNC: 0.63 MG/DL (ref 0.57–1)
EOSINOPHIL # BLD AUTO: 0.13 10*3/MM3 (ref 0–0.7)
EOSINOPHIL NFR BLD AUTO: 2.5 % (ref 0.3–6.2)
EPI CELLS #/AREA URNS HPF: NORMAL /HPF
ERYTHROCYTE [DISTWIDTH] IN BLOOD BY AUTOMATED COUNT: 12.8 % (ref 11.7–13)
GLOBULIN SER CALC-MCNC: 2.7 GM/DL
GLUCOSE SERPL-MCNC: 86 MG/DL (ref 65–99)
GLUCOSE UR QL: NEGATIVE
HBA1C MFR BLD: 5.5 % (ref 4.8–5.6)
HCT VFR BLD AUTO: 40.9 % (ref 35.6–45.5)
HDLC SERPL-MCNC: 37 MG/DL (ref 40–60)
HGB BLD-MCNC: 13.9 G/DL (ref 11.9–15.5)
HGB UR QL STRIP: NEGATIVE
IMM GRANULOCYTES # BLD: 0 10*3/MM3 (ref 0–0.03)
IMM GRANULOCYTES NFR BLD: 0 % (ref 0–0.5)
KETONES UR QL STRIP: NEGATIVE
LDLC SERPL CALC-MCNC: 119 MG/DL (ref 0–100)
LEUKOCYTE ESTERASE UR QL STRIP: NEGATIVE
LYMPHOCYTES # BLD AUTO: 2.79 10*3/MM3 (ref 0.9–4.8)
LYMPHOCYTES NFR BLD AUTO: 53.4 % (ref 19.6–45.3)
MCH RBC QN AUTO: 30.3 PG (ref 26.9–32)
MCHC RBC AUTO-ENTMCNC: 34 G/DL (ref 32.4–36.3)
MCV RBC AUTO: 89.1 FL (ref 80.5–98.2)
MICRO URNS: NORMAL
MICRO URNS: NORMAL
MONOCYTES # BLD AUTO: 0.44 10*3/MM3 (ref 0.2–1.2)
MONOCYTES NFR BLD AUTO: 8.4 % (ref 5–12)
MUCOUS THREADS URNS QL MICRO: PRESENT /HPF
NEUTROPHILS # BLD AUTO: 1.85 10*3/MM3 (ref 1.9–8.1)
NEUTROPHILS NFR BLD AUTO: 35.5 % (ref 42.7–76)
NITRITE UR QL STRIP: NEGATIVE
PH UR STRIP: 7.5 [PH] (ref 5–7.5)
PLATELET # BLD AUTO: 328 10*3/MM3 (ref 140–500)
POTASSIUM SERPL-SCNC: 4.4 MMOL/L (ref 3.5–5.2)
PROT SERPL-MCNC: 7.2 G/DL (ref 6–8.5)
PROT UR QL STRIP: NEGATIVE
RBC # BLD AUTO: 4.59 10*6/MM3 (ref 3.9–5.2)
RBC #/AREA URNS HPF: NORMAL /HPF
SODIUM SERPL-SCNC: 139 MMOL/L (ref 136–145)
SP GR UR: 1.02 (ref 1–1.03)
TRIGL SERPL-MCNC: 90 MG/DL (ref 0–150)
TSH SERPL DL<=0.005 MIU/L-ACNC: 1.21 MIU/ML (ref 0.27–4.2)
URINALYSIS REFLEX: NORMAL
UROBILINOGEN UR STRIP-MCNC: 1 MG/DL (ref 0.2–1)
VLDLC SERPL CALC-MCNC: 18 MG/DL (ref 5–40)
WBC # BLD AUTO: 5.22 10*3/MM3 (ref 4.5–10.7)
WBC #/AREA URNS HPF: NORMAL /HPF

## 2018-01-22 ENCOUNTER — OFFICE VISIT (OUTPATIENT)
Dept: INTERNAL MEDICINE | Facility: CLINIC | Age: 63
End: 2018-01-22

## 2018-01-22 VITALS
RESPIRATION RATE: 12 BRPM | BODY MASS INDEX: 28.88 KG/M2 | TEMPERATURE: 98.2 F | SYSTOLIC BLOOD PRESSURE: 128 MMHG | WEIGHT: 163 LBS | OXYGEN SATURATION: 98 % | DIASTOLIC BLOOD PRESSURE: 80 MMHG | HEART RATE: 81 BPM | HEIGHT: 63 IN

## 2018-01-22 DIAGNOSIS — M23.8X9: ICD-10-CM

## 2018-01-22 DIAGNOSIS — M79.7 FIBROMYALGIA: ICD-10-CM

## 2018-01-22 DIAGNOSIS — K21.9 GASTRIC REFLUX: ICD-10-CM

## 2018-01-22 DIAGNOSIS — M79.604 RIGHT LEG PAIN: ICD-10-CM

## 2018-01-22 DIAGNOSIS — E55.9 VITAMIN D DEFICIENCY: ICD-10-CM

## 2018-01-22 DIAGNOSIS — Z95.1 S/P CABG X 1: ICD-10-CM

## 2018-01-22 DIAGNOSIS — E78.2 MIXED HYPERLIPIDEMIA: ICD-10-CM

## 2018-01-22 DIAGNOSIS — I70.90 ARTERIOSCLEROTIC VASCULAR DISEASE: ICD-10-CM

## 2018-01-22 DIAGNOSIS — L30.1 VESICULAR PALMOPLANTAR ECZEMA: ICD-10-CM

## 2018-01-22 DIAGNOSIS — Z00.00 HEALTHCARE MAINTENANCE: Primary | ICD-10-CM

## 2018-01-22 DIAGNOSIS — I10 BENIGN ESSENTIAL HYPERTENSION: ICD-10-CM

## 2018-01-22 DIAGNOSIS — H53.8 BLURRY VISION: ICD-10-CM

## 2018-01-22 DIAGNOSIS — I65.23 BILATERAL CAROTID ARTERY STENOSIS: ICD-10-CM

## 2018-01-22 DIAGNOSIS — G43.009 MIGRAINE WITHOUT AURA AND WITHOUT STATUS MIGRAINOSUS, NOT INTRACTABLE: ICD-10-CM

## 2018-01-22 DIAGNOSIS — G47.09 OTHER INSOMNIA: ICD-10-CM

## 2018-01-22 DIAGNOSIS — R73.03 PRE-DIABETES: ICD-10-CM

## 2018-01-22 DIAGNOSIS — K64.8 INTERNAL BLEEDING HEMORRHOIDS: ICD-10-CM

## 2018-01-22 PROBLEM — M79.605 LEG PAIN, BILATERAL: Status: RESOLVED | Noted: 2017-03-22 | Resolved: 2018-01-22

## 2018-01-22 PROCEDURE — 73560 X-RAY EXAM OF KNEE 1 OR 2: CPT | Performed by: INTERNAL MEDICINE

## 2018-01-22 PROCEDURE — 99396 PREV VISIT EST AGE 40-64: CPT | Performed by: INTERNAL MEDICINE

## 2018-01-22 PROCEDURE — 99213 OFFICE O/P EST LOW 20 MIN: CPT | Performed by: INTERNAL MEDICINE

## 2018-01-22 RX ORDER — ROSUVASTATIN CALCIUM 40 MG/1
40 TABLET, COATED ORAL DAILY
Qty: 30 TABLET | Refills: 5 | Status: SHIPPED | OUTPATIENT
Start: 2018-01-22 | End: 2019-11-06 | Stop reason: SDUPTHER

## 2018-01-22 RX ORDER — OMEPRAZOLE 20 MG/1
20 CAPSULE, DELAYED RELEASE ORAL DAILY
Qty: 30 CAPSULE | Refills: 5 | Status: SHIPPED | OUTPATIENT
Start: 2018-01-22 | End: 2018-11-28

## 2018-01-22 NOTE — PROGRESS NOTES
"Annual Exam (review of medical issues )      HPI  Bernice Dunlap is a 62 y.o. female  RTC in yearly CPE, review of medical issues:   1. Sudden onset insomnia 10/2017 - is doing better.  Still on Trazodone nightly and will sit on couch and watch TV and then sets a bedtime. Has not totally resolved issues of falling asleep on couch.  Has not tried off Trazodone at this point.  Feels like med \"makes me feel better... I have noticed a change in myself\".  \"It has calmed me down\".    2. BRBPR noted on TP several events in 2017 - \"it has not happened in a while\". Has been drinking Metamucil (3x/ week). and \"eat a lot of oatmeal\". Notes had been holding BM at work too and getting away from that.  Feels like doing better.     3. Fibromyalgia - controlled on Lyrica overall, \"ya, I think so\".  Has been consistent with stress modification.  Is not doing exercise as joint gym but could not tolerate due to R leg pain.   4. ASCVD - s/p MI 2005 and CABG in 2008 with negative stress test in 4/2016 p- saw Cards in 6/2017.  No changes in meds. No angina sx at this time.   5. HTN -  on one drug.  Checked pressures last week when ill, but not normally.  OK last week though.   6. Carotid Artery Stenosis, 16-49% B - last U/S in 11/2016. No TIA sx since.    7. HLD - off Livalo due to insurance.  Is back on Crestor and has not noticed any sx really.    8. Pomphylox eczema - off all topicals at this point with near complete control.  \"It is gone today\", though will have some variable sx. Has been doing well in last few weeks.   9. Pre-DM - A1C up a bit, normal FBS.  Pt plans weight watchers at work and we discussed sweaty type exercise 2x/ week on top of usual daily walks.   9. Migraine HA - CIARA for years.   10. Pilar Cyst - on scalp.  Never did see plastic surgeon.  Really not bothering pt.   11. Vitamin D deficiency - off all Vitamin D at this time.       Review of Systems   Constitution: Positive for fever (last week, resolved, with URI) " "and malaise/fatigue (\"my energy is not good at all\", variable from day to day. ). Negative for chills, weight gain and weight loss.   HENT: Negative for congestion, hearing loss, hoarse voice, odynophagia and sore throat.    Eyes: Positive for blurred vision (noted in last few months, \"I think it is something I am taking\".  Has glasses. Had eye exam in summer and told things were OK, went to optometrist center. ). Negative for double vision, pain, redness, vision loss in left eye and vision loss in right eye.   Cardiovascular: Negative for chest pain, dyspnea on exertion, irregular heartbeat, leg swelling, near-syncope, palpitations and syncope.   Respiratory: Positive for cough (mild, after recent URI). Negative for shortness of breath and sputum production.    Hematologic/Lymphatic: Negative for bleeding problem. Does not bruise/bleed easily.   Skin: Negative for rash and suspicious lesions.   Musculoskeletal: Positive for joint pain (R knee, just superior to R knee joint. \"Gives me fits\" if does long drive, crossing leg, exercise.  Heat can help.  No  issues if just sits, but will  hurt if crosses leg.  ), muscle cramps (in R leg, intermittent) and myalgias (in R leg just superior to R knee with swelling, intermittent; occasional R calf pain. ). Negative for back pain (\"I dont have any back pain\"), joint swelling and stiffness.        Sx in R leg for last several weeks, but mild sx present for months.      Gastrointestinal: Positive for heartburn (daily, \"I keep a Pepcid AC like they are going out of style\".  ). Negative for constipation, diarrhea, dysphagia, nausea and vomiting.   Genitourinary: Negative for bladder incontinence, dysuria, frequency and hematuria.   Neurological: Negative for dizziness, headaches and light-headedness.   Psychiatric/Behavioral: Negative for depression. The patient has insomnia (as per HPI). The patient is not nervous/anxious.    Allergic/Immunologic: Negative for environmental " allergies and persistent infections.       Problem List:    Patient Active Problem List   Diagnosis   • Arteriosclerotic vascular disease   • Benign essential hypertension   • Bladder prolapse, female, acquired   • Fibromyalgia   • Hyperlipidemia   • Displacement of lumbar intervertebral disc without myelopathy   • Migraine   • Pilar cyst   • Vesicular palmoplantar eczema   • Pre-diabetes   • Vitamin D deficiency   • S/P CABG x 1   • Bilateral carotid artery stenosis   • Healthcare maintenance   • Internal bleeding hemorrhoids   • Other insomnia   • Gastric reflux       Medical History:    Past Medical History:   Diagnosis Date   • Arteriosclerosis of carotid artery     1-15% B 8/2013 U/S --> 16-49% bilaterally in 2015   • Arteriosclerotic cardiovascular disease (ASCVD)     MI 2005; s/p CABG x 1 2008   • Benign essential hypertension    • BPPV (benign paroxysmal positional vertigo)    • Fibromyalgia    • History of nephrolithiasis     on L; non-obstructing on 2015 CT; reidentified on X-ray in 2016   • Hydrothorax 3/11/2016   • Hyperlipidemia    • Migraine headache    • Myocardial infarction     Onset Date: 2005   • Pleural effusion 3/11/2016   • Pre-diabetes    • Stress and adjustment reaction 3/11/2016   • Vitamin D deficiency         Social History:    Social History     Social History   • Marital status:      Spouse name: N/A   • Number of children: 1   • Years of education: N/A     Occupational History   • Assistant      Retired [employer]   •       [employer]     Social History Main Topics   • Smoking status: Former Smoker   • Smokeless tobacco: Never Used      Comment: 6 pk/yr hx; occasionally smokes now; 2015 has quit totally   • Alcohol use No   • Drug use: No   • Sexual activity: No      Comment:  in past     Other Topics Concern   • Not on file     Social History Narrative    Diet - overall healthy,eating fruits and veggies, does eat on the run too often    Exercise -  walking daily in past; variable    Caffeine - occasional tea       Family History:   Family History   Problem Relation Age of Onset   • Heart disease Mother      Arteriosclerotic cardiovascular disease; dx'd in 50's   • Alcohol abuse Father      Alcoholism   • COPD Father    • Depression Sister    • Leukemia Sister    • Diabetes Sister      Type 2 Diabetes Mellitus   • Alcohol abuse Brother      Alcoholism   • Hypertension Brother      Benign Essential Hypertension   • Glaucoma Brother    • Hyperlipidemia Brother    • No Known Problems Son    • No Known Problems Maternal Grandmother    • No Known Problems Maternal Grandfather    • No Known Problems Paternal Grandmother    • No Known Problems Paternal Grandfather        Surgical History:   Past Surgical History:   Procedure Laterality Date   • APPENDECTOMY     • CHOLECYSTECTOMY      Laparoscopic   • CORONARY ARTERY BYPASS GRAFT      1 vessel bypass. Mammary artery to the LAD; 2007; nl stress in 2014   • HYSTERECTOMY     • SALPINGO OOPHORECTOMY Bilateral    • THORACOSCOPY      With Pleurodesis (Therapeutic) - 2012 after traumatic effusion; Dr. Tomas         Current Outpatient Prescriptions:   •  ASPIRIN PO, Take 81 mg by mouth Daily., Disp: , Rfl:   •  metoprolol succinate XL (TOPROL-XL) 50 MG 24 hr tablet, TAKE 1 TABLET BY MOUTH DAILY, Disp: 90 tablet, Rfl: 0  •  pregabalin (LYRICA) 75 MG capsule, Take 1 capsule by mouth 3 (Three) Times a Day., Disp: 90 capsule, Rfl: 3  •  psyllium (METAMUCIL) 58.6 % powder, Take  by mouth Daily., Disp: , Rfl: 12  •  traZODone (DESYREL) 50 MG tablet, Take 1 tablet by mouth Every Night., Disp: 30 tablet, Rfl: 5  •  Cholecalciferol (HM VITAMIN D3) 2000 units capsule, Take 2,000 Units by mouth Daily., Disp: 30 each, Rfl:   •  omeprazole (PRILOSEC) 20 MG capsule, Take 1 capsule by mouth Daily., Disp: 30 capsule, Rfl: 5  •  rosuvastatin (CRESTOR) 40 MG tablet, Take 1 tablet by mouth Daily., Disp: 30 tablet, Rfl: 5    Vitals:    01/22/18  1115   BP: 128/80   Pulse: 81   Resp: 12   Temp: 98.2 °F (36.8 °C)   SpO2: 98%       Physical Exam   Constitutional: She is oriented to person, place, and time. She appears well-developed and well-nourished. She does not have a sickly appearance. She does not appear ill. No distress.   HENT:   Head: Normocephalic and atraumatic.   Right Ear: Hearing, tympanic membrane, external ear and ear canal normal.   Left Ear: Hearing, tympanic membrane, external ear and ear canal normal.   Nose: Nose normal.   Mouth/Throat: Uvula is midline, oropharynx is clear and moist and mucous membranes are normal.   Eyes: Conjunctivae, EOM and lids are normal. Pupils are equal, round, and reactive to light.   Neck: Trachea normal, normal range of motion and full passive range of motion without pain. Neck supple. Carotid bruit is not present. No thyroid mass and no thyromegaly present.   Cardiovascular: Normal rate, regular rhythm, S1 normal, S2 normal, normal heart sounds and intact distal pulses.  Exam reveals no gallop and no friction rub.    No murmur heard.  Pulses:       Radial pulses are 2+ on the right side, and 2+ on the left side.        Dorsalis pedis pulses are 2+ on the right side, and 2+ on the left side.        Posterior tibial pulses are 2+ on the right side, and 2+ on the left side.   Pulmonary/Chest: Effort normal and breath sounds normal. No respiratory distress. She has no wheezes. She has no rhonchi. She has no rales. She exhibits no mass. Right breast exhibits no inverted nipple, no mass, no nipple discharge and no skin change. Left breast exhibits no inverted nipple, no mass, no nipple discharge and no skin change.   Chaperone present     Abdominal: Soft. Normal appearance and bowel sounds are normal. She exhibits no distension and no mass. There is no hepatosplenomegaly. There is no tenderness. There is no rebound and no guarding.   Musculoskeletal: Normal range of motion. She exhibits no edema.        Right knee:  She exhibits LCL laxity. She exhibits normal range of motion, no swelling, no effusion, no ecchymosis, no deformity, normal patellar mobility (-) Patellar grind test, no bony tenderness, normal meniscus (-) Andres's and no MCL laxity. No tenderness found. No medial joint line, no lateral joint line, no MCL, no LCL and no patellar tendon tenderness noted.       Vascular Status -  Her exam exhibits right foot vasculature abnormal and no right foot edema. Her exam exhibits left foot vasculature abnormal and no left foot edema.  Lymphadenopathy:     She has no cervical adenopathy.     She has no axillary adenopathy.        Right: No inguinal adenopathy present.        Left: No inguinal adenopathy present.   Neurological: She is alert and oriented to person, place, and time. She has normal strength and normal reflexes. She displays no tremor. No cranial nerve deficit or sensory deficit. She exhibits normal muscle tone. Gait normal.   Skin: Skin is warm and dry. No rash noted.        Psychiatric: She has a normal mood and affect. Her behavior is normal. Cognition and memory are normal.   Vitals reviewed.    XR R knee: R knee and quad pain ; No prior for comparison  No joint space degeneration  No fracture  Normal alignment      Assessment/ Plan  Diagnoses and all orders for this visit:    Healthcare maintenance    Right leg pain  -     XR Knee 1 or 2 View Right (In Office)  -     MRI Knee Right Without Contrast; Future    Arteriosclerotic vascular disease  -     rosuvastatin (CRESTOR) 40 MG tablet; Take 1 tablet by mouth Daily.    Benign essential hypertension    Bilateral carotid artery stenosis  -     US Carotid Bilateral; Future    Fibromyalgia    Mixed hyperlipidemia  -     rosuvastatin (CRESTOR) 40 MG tablet; Take 1 tablet by mouth Daily.    Internal bleeding hemorrhoids    Migraine without aura and without status migrainosus, not intractable    Other insomnia    Pre-diabetes    S/P CABG x 1    Vesicular  "palmoplantar eczema    Vitamin D deficiency  -     Cholecalciferol (HM VITAMIN D3) 2000 units capsule; Take 2,000 Units by mouth Daily.    Blurry vision  -     Ambulatory Referral to Ophthalmology    Gastric reflux  -     omeprazole (PRILOSEC) 20 MG capsule; Take 1 capsule by mouth Daily.    Derangement of collateral ligament of knee, old, unspecified laterality  -     MRI Knee Right Without Contrast; Future        Return in about 6 weeks (around 3/5/2018) for Recheck.      Discussion:  Bernice Dunlap is a 62 y.o. female  RTC in yearly CPE, review of medical issues with multiple new issues noted today:  1. Sudden onset insomnia 10/2017, etiology unclear but suspected poor sleep hygiene habits - improved sleep hygiene with setting bedtime and less falling asleep on the cough.  Remains on Trazodone and feels mood and sleep are better. C/W asme for now.   Mood good, denies depression.   2. BRBPR noted on TP several events in 2017 - no recurrence with Metamucil (3x/ week) and daily oatmeal. Advised to changed to daily Metamucil.   3. Fibromyalgia - controlled on Lyrica and stress reduction efforts.   4. ASCVD s/p MI 2005 and CABG in 2008 with negative stress test in 4/2016 - s/p Cards eval 6/2017. C/W ASA/ B-blocker/statin.   5. HTN - controlled on one drug.  BMP OK.  6. Carotid Artery Stenosis, 16-49% B - no TIA sx. F/U U/S due, order placed. C/W ASA and statin daily.    7. HLD - off Livalo due to insurance, on Crestor but need LDL at least <100.  Will change to Crestor 40mg daily and trend FLP/LFT\"s  In 6 weeks.     8. Pomphylox eczema - off all topicals at this point with near complete control.  Will restart Elidel with cycles of topical steroids if recurs.   9. Pre-DM - A1C and FBS normalized at this time. Will trend.   9. Migraine HA - CIARA for years.   10. Pilar Cyst - on scalp, declines plastic surgery eval at this time as asx'ic.   11. Vitamin D deficiency - recurrent, start 2000 I.U. Vitamin D3 oOC daily.1  12. " R leg/ knee pain - unusual presentation, X-ray unrevealing. Exam notable for LCL laxity. I wonder if pt is compensating for joint stability with quad muscles due to LCL ligament tear.  Will proceed with MRI to better define as, at this point, pain issues are preventing pt from doing any exercise.   13. Blurred vision - new c/o, did not respond to glasses from optometry. WIll refer to opthamology for eval.  14. Gastric Reflux - new mention today, sx are daily, no red flag sx.  Meal intake is trigger. Start PPI Rx daily and trend sx at 6 week f/u.   12. HM - labs d/w pt; Flu/Tdap / Pvax - UTD; Shingrix discussed; C-scope 2010 (-) --> 10 years; Mammo due, refer; Pap D/C'd s/p LEONORA; DEXA normal 2012, repeat due, prior to f/u; Hep C Ab (-) 2014; add more exercise    RTC 6 weeks, F labs and DEXA prior

## 2018-01-23 DIAGNOSIS — I65.23 BILATERAL CAROTID ARTERY STENOSIS: Primary | ICD-10-CM

## 2018-01-31 ENCOUNTER — HOSPITAL ENCOUNTER (OUTPATIENT)
Dept: MRI IMAGING | Facility: HOSPITAL | Age: 63
Discharge: HOME | End: 2018-01-31

## 2018-01-31 ENCOUNTER — HOSPITAL ENCOUNTER (OUTPATIENT)
Dept: CARDIOLOGY | Facility: HOSPITAL | Age: 63
Discharge: HOME | End: 2018-01-31
Admitting: INTERNAL MEDICINE

## 2018-01-31 DIAGNOSIS — M23.8X1 KNEE JOINT LAXITY, RIGHT: ICD-10-CM

## 2018-01-31 DIAGNOSIS — M25.561 ACUTE PAIN OF RIGHT KNEE: Primary | ICD-10-CM

## 2018-01-31 DIAGNOSIS — M23.8X9: ICD-10-CM

## 2018-01-31 DIAGNOSIS — M79.604 RIGHT LEG PAIN: ICD-10-CM

## 2018-01-31 DIAGNOSIS — I65.23 BILATERAL CAROTID ARTERY STENOSIS: ICD-10-CM

## 2018-01-31 DIAGNOSIS — M79.7 FIBROMYALGIA: ICD-10-CM

## 2018-01-31 LAB
BH CV XLRA MEAS LEFT CCA RATIO VEL: 58.5 CM/SEC
BH CV XLRA MEAS LEFT DIST CCA EDV: 16.1 CM/SEC
BH CV XLRA MEAS LEFT DIST CCA PSV: 58.5 CM/SEC
BH CV XLRA MEAS LEFT DIST ICA EDV: -39.3 CM/SEC
BH CV XLRA MEAS LEFT DIST ICA PSV: -115 CM/SEC
BH CV XLRA MEAS LEFT ICA RATIO VEL: -115 CM/SEC
BH CV XLRA MEAS LEFT ICA/CCA RATIO: -2
BH CV XLRA MEAS LEFT MID ICA EDV: -30.6 CM/SEC
BH CV XLRA MEAS LEFT MID ICA PSV: -99.8 CM/SEC
BH CV XLRA MEAS LEFT PROX CCA EDV: -14.7 CM/SEC
BH CV XLRA MEAS LEFT PROX CCA PSV: -101 CM/SEC
BH CV XLRA MEAS LEFT PROX ECA EDV: 13 CM/SEC
BH CV XLRA MEAS LEFT PROX ECA PSV: 70.8 CM/SEC
BH CV XLRA MEAS LEFT PROX ICA EDV: 23 CM/SEC
BH CV XLRA MEAS LEFT PROX ICA PSV: 81 CM/SEC
BH CV XLRA MEAS LEFT PROX SCLA PSV: 135 CM/SEC
BH CV XLRA MEAS LEFT VERTEBRAL A EDV: 14.9 CM/SEC
BH CV XLRA MEAS LEFT VERTEBRAL A PSV: 52.3 CM/SEC
BH CV XLRA MEAS RIGHT CCA RATIO VEL: -63.2 CM/SEC
BH CV XLRA MEAS RIGHT DIST CCA EDV: -18.9 CM/SEC
BH CV XLRA MEAS RIGHT DIST CCA PSV: -63.2 CM/SEC
BH CV XLRA MEAS RIGHT DIST ICA EDV: -30.7 CM/SEC
BH CV XLRA MEAS RIGHT DIST ICA PSV: -87.6 CM/SEC
BH CV XLRA MEAS RIGHT ICA RATIO VEL: -109 CM/SEC
BH CV XLRA MEAS RIGHT ICA/CCA RATIO: 1.7
BH CV XLRA MEAS RIGHT MID ICA EDV: -33 CM/SEC
BH CV XLRA MEAS RIGHT MID ICA PSV: -109 CM/SEC
BH CV XLRA MEAS RIGHT PROX CCA EDV: -18.7 CM/SEC
BH CV XLRA MEAS RIGHT PROX CCA PSV: -92.9 CM/SEC
BH CV XLRA MEAS RIGHT PROX ECA EDV: -10 CM/SEC
BH CV XLRA MEAS RIGHT PROX ECA PSV: -71.5 CM/SEC
BH CV XLRA MEAS RIGHT PROX ICA EDV: -44.8 CM/SEC
BH CV XLRA MEAS RIGHT PROX ICA PSV: -109 CM/SEC
BH CV XLRA MEAS RIGHT PROX SCLA PSV: 87.9 CM/SEC
BH CV XLRA MEAS RIGHT VERTEBRAL A EDV: 13.7 CM/SEC
BH CV XLRA MEAS RIGHT VERTEBRAL A PSV: 54.2 CM/SEC
LEFT ARM BP: NORMAL MMHG
RIGHT ARM BP: NORMAL MMHG

## 2018-01-31 PROCEDURE — 73721 MRI JNT OF LWR EXTRE W/O DYE: CPT

## 2018-01-31 PROCEDURE — 93880 EXTRACRANIAL BILAT STUDY: CPT

## 2018-02-26 DIAGNOSIS — E78.2 MIXED HYPERLIPIDEMIA: ICD-10-CM

## 2018-02-26 DIAGNOSIS — I10 BENIGN ESSENTIAL HYPERTENSION: Primary | ICD-10-CM

## 2018-02-27 LAB
ALBUMIN SERPL-MCNC: 4.5 G/DL (ref 3.5–5.2)
ALBUMIN/GLOB SERPL: 1.6 G/DL
ALP SERPL-CCNC: 66 U/L (ref 39–117)
ALT SERPL-CCNC: 13 U/L (ref 1–33)
AST SERPL-CCNC: 18 U/L (ref 1–32)
BILIRUB SERPL-MCNC: 0.6 MG/DL (ref 0.1–1.2)
BUN SERPL-MCNC: 15 MG/DL (ref 8–23)
BUN/CREAT SERPL: 20.3 (ref 7–25)
CALCIUM SERPL-MCNC: 9.4 MG/DL (ref 8.6–10.5)
CHLORIDE SERPL-SCNC: 100 MMOL/L (ref 98–107)
CHOLEST SERPL-MCNC: 179 MG/DL (ref 0–200)
CO2 SERPL-SCNC: 28.8 MMOL/L (ref 22–29)
CREAT SERPL-MCNC: 0.74 MG/DL (ref 0.57–1)
GFR SERPLBLD CREATININE-BSD FMLA CKD-EPI: 80 ML/MIN/1.73
GFR SERPLBLD CREATININE-BSD FMLA CKD-EPI: 97 ML/MIN/1.73
GLOBULIN SER CALC-MCNC: 2.8 GM/DL
GLUCOSE SERPL-MCNC: 98 MG/DL (ref 65–99)
HDLC SERPL-MCNC: 46 MG/DL (ref 40–60)
LDLC SERPL CALC-MCNC: 116 MG/DL (ref 0–100)
POTASSIUM SERPL-SCNC: 4.3 MMOL/L (ref 3.5–5.2)
PROT SERPL-MCNC: 7.3 G/DL (ref 6–8.5)
SODIUM SERPL-SCNC: 137 MMOL/L (ref 136–145)
TRIGL SERPL-MCNC: 83 MG/DL (ref 0–150)
VLDLC SERPL CALC-MCNC: 16.6 MG/DL (ref 5–40)

## 2018-03-05 ENCOUNTER — OFFICE VISIT (OUTPATIENT)
Dept: INTERNAL MEDICINE | Facility: CLINIC | Age: 63
End: 2018-03-05

## 2018-03-05 VITALS
TEMPERATURE: 98.4 F | HEART RATE: 70 BPM | OXYGEN SATURATION: 99 % | BODY MASS INDEX: 28.17 KG/M2 | SYSTOLIC BLOOD PRESSURE: 130 MMHG | DIASTOLIC BLOOD PRESSURE: 80 MMHG | HEIGHT: 63 IN | WEIGHT: 159 LBS

## 2018-03-05 DIAGNOSIS — I10 BENIGN ESSENTIAL HYPERTENSION: Primary | ICD-10-CM

## 2018-03-05 DIAGNOSIS — Z00.00 HEALTHCARE MAINTENANCE: ICD-10-CM

## 2018-03-05 DIAGNOSIS — I65.23 BILATERAL CAROTID ARTERY STENOSIS: ICD-10-CM

## 2018-03-05 DIAGNOSIS — E78.2 MIXED HYPERLIPIDEMIA: ICD-10-CM

## 2018-03-05 PROCEDURE — 99214 OFFICE O/P EST MOD 30 MIN: CPT | Performed by: INTERNAL MEDICINE

## 2018-03-05 NOTE — PROGRESS NOTES
"Hypertension      HPI  Bernice Dunlap is a 62 y.o. female RTC in f/u:   1. HTN - controlled on one drug.  No home checks.  Has cuff but \"I dont every think about it\".  Note long hx but has overall been controlled.   2. Carotid Artery Stenosis, 16-49% B - no TIA sx. U/S done, aware of results. Does wonder about progression noted on stenosis level since 2013 U/S.     3. HLD - off Livalo at last visit due to insurance, on Crestor now and doing well. NO problems with starting new med and feels like doing well. No myalgias noted.  Had labs.    4. HM - did not get DEXA prior to today's visit, does not recall that discussion from last visit.       Review of Systems   Cardiovascular: Negative for chest pain and dyspnea on exertion.   Respiratory: Negative for shortness of breath.    Musculoskeletal: Negative for muscle weakness and myalgias.   Neurological: Negative for focal weakness.       The following portions of the patient's history were reviewed and updated as appropriate: allergies, current medications, past medical history and problem list.      Current Outpatient Prescriptions:   •  ASPIRIN PO, Take 81 mg by mouth Daily., Disp: , Rfl:   •  Cholecalciferol ( VITAMIN D3) 2000 units capsule, Take 2,000 Units by mouth Daily., Disp: 30 each, Rfl:   •  metoprolol succinate XL (TOPROL-XL) 50 MG 24 hr tablet, TAKE 1 TABLET BY MOUTH DAILY, Disp: 90 tablet, Rfl: 0  •  omeprazole (PRILOSEC) 20 MG capsule, Take 1 capsule by mouth Daily., Disp: 30 capsule, Rfl: 5  •  pregabalin (LYRICA) 75 MG capsule, Take 1 capsule by mouth 3 (Three) Times a Day., Disp: 90 capsule, Rfl: 3  •  psyllium (METAMUCIL) 58.6 % powder, Take  by mouth Daily., Disp: , Rfl: 12  •  rosuvastatin (CRESTOR) 40 MG tablet, Take 1 tablet by mouth Daily., Disp: 30 tablet, Rfl: 5  •  traZODone (DESYREL) 50 MG tablet, Take 1 tablet by mouth Every Night., Disp: 30 tablet, Rfl: 5    Vitals:    03/05/18 1150   BP: 130/80   Pulse: 70   Temp: 98.4 °F (36.9 °C) " "  SpO2: 99%   Weight: 72.1 kg (159 lb)   Height: 160 cm (63\")         Physical Exam   Constitutional: She is oriented to person, place, and time. She appears well-developed and well-nourished. No distress.   HENT:   Head: Normocephalic and atraumatic.   Mouth/Throat: Oropharynx is clear and moist. No oropharyngeal exudate.   Eyes: Pupils are equal, round, and reactive to light.   Neck: Normal range of motion. Neck supple.   Cardiovascular: Normal rate, regular rhythm and normal heart sounds.    Pulmonary/Chest: Effort normal and breath sounds normal. No respiratory distress. She has no wheezes. She has no rales.   Musculoskeletal: She exhibits no edema.   Lymphadenopathy:     She has no cervical adenopathy.   Neurological: She is alert and oriented to person, place, and time. No cranial nerve deficit. She exhibits normal muscle tone. Gait normal.   Psychiatric: She has a normal mood and affect. Her behavior is normal.   Vitals reviewed.      Assessment/ Plan  Diagnoses and all orders for this visit:    Benign essential hypertension    Bilateral carotid artery stenosis    Healthcare maintenance    Mixed hyperlipidemia      Return in about 5 months (around 8/5/2018) for Recheck.      Discussion:  Bernice Dunlap is a 62 y.o. female RTC in f/u:   1. HTN - controlled on one drug x 2 recent checks.  BMP OK. C/W same med.   2. Carotid Artery Stenosis, 16-49% B on 1/2018 U/S - d/w pt the margin of error and indirect nature of measuring on U/S and reassured about 1-15% --> 16-49% progression since 2013. Pt is now off tobacco and remains on ASA and high dose statin daily.  If progression in future years, will need to max out statin dose and consider vascular eval/ CT angio for more definitive measurement, but for now c/w same.   3. HLD - off Livalo due to insurance, now on new start of Crestor. Tolerating well.  LFT's OK. LDL near goal on recheck.  Will c/w current dosing.    4. HM -  DEXA normal 2012, repeat due, prior to " f/u. D/W pt to ensure schedules DEXA.       RTC 5 months, F labs and DEXA prior

## 2018-03-13 DIAGNOSIS — M79.7 FIBROMYALGIA: ICD-10-CM

## 2018-05-12 DIAGNOSIS — I10 BENIGN ESSENTIAL HYPERTENSION: ICD-10-CM

## 2018-05-14 RX ORDER — METOPROLOL SUCCINATE 50 MG/1
TABLET, EXTENDED RELEASE ORAL
Qty: 90 TABLET | Refills: 0 | Status: SHIPPED | OUTPATIENT
Start: 2018-05-14 | End: 2018-09-08 | Stop reason: SDUPTHER

## 2018-08-03 DIAGNOSIS — E55.9 VITAMIN D DEFICIENCY: ICD-10-CM

## 2018-08-03 DIAGNOSIS — R73.03 PRE-DIABETES: ICD-10-CM

## 2018-08-03 DIAGNOSIS — I10 BENIGN ESSENTIAL HYPERTENSION: Primary | ICD-10-CM

## 2018-08-15 ENCOUNTER — OFFICE VISIT (OUTPATIENT)
Dept: INTERNAL MEDICINE | Facility: CLINIC | Age: 63
End: 2018-08-15

## 2018-08-15 VITALS
HEART RATE: 82 BPM | BODY MASS INDEX: 28.35 KG/M2 | SYSTOLIC BLOOD PRESSURE: 128 MMHG | DIASTOLIC BLOOD PRESSURE: 70 MMHG | OXYGEN SATURATION: 98 % | WEIGHT: 160 LBS | HEIGHT: 63 IN | TEMPERATURE: 98.3 F

## 2018-08-15 DIAGNOSIS — R73.03 PRE-DIABETES: ICD-10-CM

## 2018-08-15 DIAGNOSIS — N20.0 NEPHROLITHIASIS: ICD-10-CM

## 2018-08-15 DIAGNOSIS — I10 BENIGN ESSENTIAL HYPERTENSION: Primary | ICD-10-CM

## 2018-08-15 DIAGNOSIS — Z00.00 HEALTHCARE MAINTENANCE: ICD-10-CM

## 2018-08-15 DIAGNOSIS — E78.2 MIXED HYPERLIPIDEMIA: ICD-10-CM

## 2018-08-15 PROCEDURE — 99214 OFFICE O/P EST MOD 30 MIN: CPT | Performed by: INTERNAL MEDICINE

## 2018-08-15 RX ORDER — METHOCARBAMOL 500 MG/1
500 TABLET, FILM COATED ORAL
COMMUNITY
Start: 2018-07-22 | End: 2018-11-28

## 2018-08-15 RX ORDER — DICLOFENAC SODIUM 75 MG/1
TABLET, DELAYED RELEASE ORAL
COMMUNITY
Start: 2018-07-22 | End: 2019-02-15

## 2018-08-15 NOTE — PROGRESS NOTES
"Hypertension      HPI  Bernice Dunlap is a 63 y.o. female  RTC in f/u:   Had suspected kidney stone 7/2018, \"it was a bad pain\" for 2 days. CT was negative for acute cause of pain. Sent home from ED. Pain resolved.  No blood in urine noted at this time.    1. HTN -  on one drug.  Checking at home 1-2x/ week.  Has been getting good numbers.  x 2 recent checks.  BMP OK. C/W same med.    2. HLD - off Livalo due to insurance, now on Crestor. Tolerating well. No issues.   3. Pre-DM - noted in past. Diet \"not so good. I dont know what to eat\".  \"Tries to watch weight\".  Will often miss meals and then will eat large meal.  Walks for exercise, sometimes goes to gym. No change in weight.   4. HM -  DEXA normal 2012, repeat due and pt did not complete.  \"I guess I forgot about it\".     Review of Systems   Constitution: Negative for chills and fever.   Cardiovascular: Negative for chest pain, dyspnea on exertion, irregular heartbeat and palpitations.   Respiratory: Negative for shortness of breath.    Gastrointestinal: Negative for abdominal pain (resolved after 7/2018 event), nausea and vomiting.   Genitourinary: Negative for dysuria, frequency and hematuria.       The following portions of the patient's history were reviewed and updated as appropriate: allergies, current medications, past medical history and problem list.      Current Outpatient Prescriptions:   •  ASPIRIN PO, Take 81 mg by mouth Daily., Disp: , Rfl:   •  Cholecalciferol ( VITAMIN D3) 2000 units capsule, Take 2,000 Units by mouth Daily., Disp: 30 each, Rfl:   •  diclofenac (VOLTAREN) 75 MG EC tablet, , Disp: , Rfl:   •  LYRICA 75 MG capsule, TAKE 1 CAPSULE BY MOUTH THREE TIMES DAILY, Disp: 90 capsule, Rfl: 2  •  methocarbamol (ROBAXIN) 500 MG tablet, Take 500 mg by mouth., Disp: , Rfl:   •  metoprolol succinate XL (TOPROL-XL) 50 MG 24 hr tablet, TAKE 1 TABLET BY MOUTH DAILY, Disp: 90 tablet, Rfl: 0  •  omeprazole (PRILOSEC) 20 MG capsule, Take 1 capsule by " "mouth Daily., Disp: 30 capsule, Rfl: 5  •  psyllium (METAMUCIL) 58.6 % powder, Take  by mouth Daily., Disp: , Rfl: 12  •  rosuvastatin (CRESTOR) 40 MG tablet, Take 1 tablet by mouth Daily., Disp: 30 tablet, Rfl: 5  •  traZODone (DESYREL) 50 MG tablet, Take 1 tablet by mouth Every Night., Disp: 30 tablet, Rfl: 5    Vitals:    08/15/18 1043   BP: 128/70   Pulse: 82   Temp: 98.3 °F (36.8 °C)   SpO2: 98%   Weight: 72.6 kg (160 lb)   Height: 160 cm (63\")         Physical Exam   Constitutional: She is oriented to person, place, and time. She appears well-developed and well-nourished. No distress.   HENT:   Head: Normocephalic and atraumatic.   Mouth/Throat: Oropharynx is clear and moist. No oropharyngeal exudate.   Large kay noted in mouth     Eyes: Pupils are equal, round, and reactive to light.   Neck: Normal range of motion. Neck supple.   Cardiovascular: Normal rate, regular rhythm and normal heart sounds.    Pulmonary/Chest: Effort normal and breath sounds normal. No respiratory distress. She has no wheezes. She has no rales.   Musculoskeletal: She exhibits no edema.   Lymphadenopathy:     She has no cervical adenopathy.   Neurological: She is alert and oriented to person, place, and time. No cranial nerve deficit. She exhibits normal muscle tone. Gait normal.   Psychiatric: She has a normal mood and affect. Her behavior is normal.   Vitals reviewed.      Assessment/ Plan  Diagnoses and all orders for this visit:    Benign essential hypertension    Pre-diabetes    Healthcare maintenance    Mixed hyperlipidemia    Nephrolithiasis  -     UA / M With / Rflx Culture([facility] ONLY) - Urine, Clean Catch    Other orders  -     methocarbamol (ROBAXIN) 500 MG tablet; Take 500 mg by mouth.  -     diclofenac (VOLTAREN) 75 MG EC tablet;         Return in about 5 months (around 1/15/2019) for Annual physical.      Discussion:  jorge l Dunlap is a 63 y.o. female  RTC in f/u:    1. Nephrolithiasis - new issue 7/2018, seen in ED at " [facility], note reviewed today.  Suspected passed stone and noted retained stone in L kidney on CT.  Pt asx'ic at this time.  Will recheck U/A today to ensure cleared blood in urine.    2. HTN -  controlled on one drug.  Check BMP today.   3. Carotid Artery Stenosis, 16-49% B on 1/2018 U/S - 1-15% --> 16-49% progression since 2013. Off tobacco and remains on ASA and high dose statin daily. Recheck U/S in 1/2019.   4. HLD - off Livalo due to insurance, now on Crestor. Trend LFT's today. Check lipids prior to f/u appt.   5. Pre-DM - A1C and FBS normalized last labs, but pt feels like diet not ideal.  Requests assistance and we reviewed [facility] diet handout today.  Trend A1C today on labs.   6. HM -  DEXA normal 2012, repeat due, prior to f/u (pt missed appt prior to today's visit).     RTC 5 months CPE, F labs and DEXA prior.

## 2018-08-16 LAB
25(OH)D3+25(OH)D2 SERPL-MCNC: 18.5 NG/ML (ref 30–100)
ALBUMIN SERPL-MCNC: 4.3 G/DL (ref 3.5–5.2)
ALBUMIN/GLOB SERPL: 1.4 G/DL
ALP SERPL-CCNC: 66 U/L (ref 39–117)
ALT SERPL-CCNC: 15 U/L (ref 1–33)
AST SERPL-CCNC: 16 U/L (ref 1–32)
BASOPHILS # BLD AUTO: 0.02 10*3/MM3 (ref 0–0.2)
BASOPHILS NFR BLD AUTO: 0.4 % (ref 0–1.5)
BILIRUB SERPL-MCNC: 0.6 MG/DL (ref 0.1–1.2)
BUN SERPL-MCNC: 14 MG/DL (ref 8–23)
BUN/CREAT SERPL: 19.4 (ref 7–25)
CALCIUM SERPL-MCNC: 10 MG/DL (ref 8.6–10.5)
CHLORIDE SERPL-SCNC: 101 MMOL/L (ref 98–107)
CO2 SERPL-SCNC: 26.6 MMOL/L (ref 22–29)
CREAT SERPL-MCNC: 0.72 MG/DL (ref 0.57–1)
EOSINOPHIL # BLD AUTO: 0.16 10*3/MM3 (ref 0–0.7)
EOSINOPHIL NFR BLD AUTO: 2.8 % (ref 0.3–6.2)
ERYTHROCYTE [DISTWIDTH] IN BLOOD BY AUTOMATED COUNT: 12.9 % (ref 11.7–13)
GLOBULIN SER CALC-MCNC: 3 GM/DL
GLUCOSE SERPL-MCNC: 84 MG/DL (ref 65–99)
HBA1C MFR BLD: 5.56 % (ref 4.8–5.6)
HCT VFR BLD AUTO: 41.8 % (ref 35.6–45.5)
HGB BLD-MCNC: 13.5 G/DL (ref 11.9–15.5)
IMM GRANULOCYTES # BLD: 0 10*3/MM3 (ref 0–0.03)
IMM GRANULOCYTES NFR BLD: 0 % (ref 0–0.5)
LYMPHOCYTES # BLD AUTO: 2.67 10*3/MM3 (ref 0.9–4.8)
LYMPHOCYTES NFR BLD AUTO: 47.3 % (ref 19.6–45.3)
MCH RBC QN AUTO: 29.5 PG (ref 26.9–32)
MCHC RBC AUTO-ENTMCNC: 32.3 G/DL (ref 32.4–36.3)
MCV RBC AUTO: 91.3 FL (ref 80.5–98.2)
MONOCYTES # BLD AUTO: 0.37 10*3/MM3 (ref 0.2–1.2)
MONOCYTES NFR BLD AUTO: 6.6 % (ref 5–12)
NEUTROPHILS # BLD AUTO: 2.42 10*3/MM3 (ref 1.9–8.1)
NEUTROPHILS NFR BLD AUTO: 42.9 % (ref 42.7–76)
PLATELET # BLD AUTO: 362 10*3/MM3 (ref 140–500)
POTASSIUM SERPL-SCNC: 4.3 MMOL/L (ref 3.5–5.2)
PROT SERPL-MCNC: 7.3 G/DL (ref 6–8.5)
RBC # BLD AUTO: 4.58 10*6/MM3 (ref 3.9–5.2)
SODIUM SERPL-SCNC: 139 MMOL/L (ref 136–145)
WBC # BLD AUTO: 5.64 10*3/MM3 (ref 4.5–10.7)

## 2018-09-08 DIAGNOSIS — I10 BENIGN ESSENTIAL HYPERTENSION: ICD-10-CM

## 2018-09-10 RX ORDER — METOPROLOL SUCCINATE 50 MG/1
TABLET, EXTENDED RELEASE ORAL
Qty: 90 TABLET | Refills: 0 | Status: SHIPPED | OUTPATIENT
Start: 2018-09-10 | End: 2018-12-05 | Stop reason: SDUPTHER

## 2018-10-24 DIAGNOSIS — G47.09 OTHER INSOMNIA: ICD-10-CM

## 2018-10-24 RX ORDER — TRAZODONE HYDROCHLORIDE 50 MG/1
TABLET ORAL
Qty: 30 TABLET | Refills: 0 | Status: SHIPPED | OUTPATIENT
Start: 2018-10-24 | End: 2019-01-07 | Stop reason: SDUPTHER

## 2018-11-26 ENCOUNTER — TELEPHONE (OUTPATIENT)
Dept: INTERNAL MEDICINE | Facility: CLINIC | Age: 63
End: 2018-11-26

## 2018-11-26 NOTE — TELEPHONE ENCOUNTER
Patient called asking for nitro pills. She states she is having discomfort in her chest. She states not a heart attack because she has had one before. She states it just feels heavy. I explained before Dr. Morales will give you a RX for this medication he would need to see you in the office. I also explained if she is having chest pain she really needs to seen in the ED. She states if it gets that bad she will go to the ED. We have made appointment for her on Wednesday @ 08:15.    Yes

## 2018-11-27 NOTE — TELEPHONE ENCOUNTER
I was present for call (~4pm). Pt indicated if felt like reflux sx but OTC meds were not helping the sx.  Did encouarge ED visit but pt elected to make appt in office.

## 2018-11-28 ENCOUNTER — HOSPITAL ENCOUNTER (OUTPATIENT)
Dept: CARDIOLOGY | Facility: HOSPITAL | Age: 63
Discharge: HOME | End: 2018-11-28
Attending: INTERNAL MEDICINE

## 2018-11-28 ENCOUNTER — OFFICE VISIT (OUTPATIENT)
Dept: INTERNAL MEDICINE | Facility: CLINIC | Age: 63
End: 2018-11-28

## 2018-11-28 ENCOUNTER — HOSPITAL ENCOUNTER (OUTPATIENT)
Dept: CARDIOLOGY | Facility: HOSPITAL | Age: 63
Discharge: HOME | End: 2018-11-28
Admitting: INTERNAL MEDICINE

## 2018-11-28 VITALS
HEART RATE: 66 BPM | SYSTOLIC BLOOD PRESSURE: 215 MMHG | RESPIRATION RATE: 16 BRPM | DIASTOLIC BLOOD PRESSURE: 94 MMHG | OXYGEN SATURATION: 99 %

## 2018-11-28 VITALS
HEIGHT: 63 IN | DIASTOLIC BLOOD PRESSURE: 98 MMHG | HEART RATE: 71 BPM | OXYGEN SATURATION: 99 % | BODY MASS INDEX: 28 KG/M2 | WEIGHT: 158 LBS | TEMPERATURE: 98.6 F | SYSTOLIC BLOOD PRESSURE: 172 MMHG

## 2018-11-28 VITALS — WEIGHT: 158 LBS | HEIGHT: 64 IN | BODY MASS INDEX: 26.98 KG/M2

## 2018-11-28 DIAGNOSIS — M25.512 ACUTE PAIN OF BOTH SHOULDERS: ICD-10-CM

## 2018-11-28 DIAGNOSIS — R07.9 CHEST PAIN, UNSPECIFIED TYPE: ICD-10-CM

## 2018-11-28 DIAGNOSIS — R06.02 SHORTNESS OF BREATH: ICD-10-CM

## 2018-11-28 DIAGNOSIS — K21.9 GASTRIC REFLUX: ICD-10-CM

## 2018-11-28 DIAGNOSIS — I10 BENIGN ESSENTIAL HYPERTENSION: ICD-10-CM

## 2018-11-28 DIAGNOSIS — R07.89 CHEST PRESSURE: Primary | ICD-10-CM

## 2018-11-28 DIAGNOSIS — M25.511 ACUTE PAIN OF BOTH SHOULDERS: ICD-10-CM

## 2018-11-28 DIAGNOSIS — I70.90 ARTERIOSCLEROTIC VASCULAR DISEASE: ICD-10-CM

## 2018-11-28 LAB
ALBUMIN SERPL-MCNC: 4.1 G/DL (ref 3.5–5.2)
ALBUMIN/GLOB SERPL: 1.1 G/DL
ALP SERPL-CCNC: 76 U/L (ref 39–117)
ALT SERPL W P-5'-P-CCNC: 13 U/L (ref 1–33)
ANION GAP SERPL CALCULATED.3IONS-SCNC: 8.5 MMOL/L
AST SERPL-CCNC: 20 U/L (ref 1–32)
BASOPHILS # BLD AUTO: 0.02 10*3/MM3 (ref 0–0.2)
BASOPHILS NFR BLD AUTO: 0.5 % (ref 0–1.5)
BH CV NUCLEAR PRIOR STUDY: 3
BH CV STRESS BP STAGE 1: NORMAL
BH CV STRESS COMMENTS STAGE 1: NORMAL
BH CV STRESS DOSE REGADENOSON STAGE 1: 0.4
BH CV STRESS DURATION MIN STAGE 1: 0
BH CV STRESS DURATION SEC STAGE 1: 10
BH CV STRESS HR STAGE 1: 130
BH CV STRESS PROTOCOL 1: NORMAL
BH CV STRESS RECOVERY BP: NORMAL MMHG
BH CV STRESS RECOVERY HR: 89 BPM
BH CV STRESS STAGE 1: 1
BILIRUB SERPL-MCNC: 0.5 MG/DL (ref 0.1–1.2)
BUN BLD-MCNC: 13 MG/DL (ref 8–23)
BUN/CREAT SERPL: 19.7 (ref 7–25)
CALCIUM SPEC-SCNC: 9.6 MG/DL (ref 8.6–10.5)
CHLORIDE SERPL-SCNC: 101 MMOL/L (ref 98–107)
CO2 SERPL-SCNC: 26.5 MMOL/L (ref 22–29)
CREAT BLD-MCNC: 0.66 MG/DL (ref 0.57–1)
D DIMER PPP FEU-MCNC: <0.27 MCGFEU/ML (ref 0–0.49)
DEPRECATED RDW RBC AUTO: 40.3 FL (ref 37–54)
EOSINOPHIL # BLD AUTO: 0.11 10*3/MM3 (ref 0–0.7)
EOSINOPHIL NFR BLD AUTO: 2.5 % (ref 0.3–6.2)
ERYTHROCYTE [DISTWIDTH] IN BLOOD BY AUTOMATED COUNT: 12.2 % (ref 11.7–13)
GFR SERPL CREATININE-BSD FRML MDRD: 110 ML/MIN/1.73
GLOBULIN UR ELPH-MCNC: 3.8 GM/DL
GLUCOSE BLD-MCNC: 90 MG/DL (ref 65–99)
HCT VFR BLD AUTO: 42.4 % (ref 35.6–45.5)
HGB BLD-MCNC: 14.3 G/DL (ref 11.9–15.5)
IMM GRANULOCYTES # BLD: 0 10*3/MM3 (ref 0–0.03)
IMM GRANULOCYTES NFR BLD: 0 % (ref 0–0.5)
LV EF NUC BP: 71 %
LYMPHOCYTES # BLD AUTO: 2.34 10*3/MM3 (ref 0.9–4.8)
LYMPHOCYTES NFR BLD AUTO: 52.9 % (ref 19.6–45.3)
MAXIMAL PREDICTED HEART RATE: 158 BPM
MCH RBC QN AUTO: 30.6 PG (ref 26.9–32)
MCHC RBC AUTO-ENTMCNC: 33.7 G/DL (ref 32.4–36.3)
MCV RBC AUTO: 90.6 FL (ref 80.5–98.2)
MONOCYTES # BLD AUTO: 0.29 10*3/MM3 (ref 0.2–1.2)
MONOCYTES NFR BLD AUTO: 6.6 % (ref 5–12)
NEUTROPHILS # BLD AUTO: 1.66 10*3/MM3 (ref 1.9–8.1)
NEUTROPHILS NFR BLD AUTO: 37.5 % (ref 42.7–76)
NT-PROBNP SERPL-MCNC: 87.7 PG/ML (ref 5–900)
PERCENT MAX PREDICTED HR: 82.28 %
PLATELET # BLD AUTO: 350 10*3/MM3 (ref 140–500)
PMV BLD AUTO: 10 FL (ref 6–12)
POTASSIUM BLD-SCNC: 4.1 MMOL/L (ref 3.5–5.2)
PROT SERPL-MCNC: 7.9 G/DL (ref 6–8.5)
RBC # BLD AUTO: 4.68 10*6/MM3 (ref 3.9–5.2)
SODIUM BLD-SCNC: 136 MMOL/L (ref 136–145)
STRESS BASELINE BP: NORMAL MMHG
STRESS BASELINE HR: 70 BPM
STRESS PERCENT HR: 97 %
STRESS POST EXERCISE DUR SEC: 10 SEC
STRESS POST PEAK BP: NORMAL MMHG
STRESS POST PEAK HR: 130 BPM
STRESS TARGET HR: 134 BPM
TROPONIN T SERPL-MCNC: <0.01 NG/ML (ref 0–0.03)
WBC NRBC COR # BLD: 4.42 10*3/MM3 (ref 4.5–10.7)

## 2018-11-28 PROCEDURE — 84484 ASSAY OF TROPONIN QUANT: CPT | Performed by: INTERNAL MEDICINE

## 2018-11-28 PROCEDURE — 78452 HT MUSCLE IMAGE SPECT MULT: CPT

## 2018-11-28 PROCEDURE — 94760 N-INVAS EAR/PLS OXIMETRY 1: CPT

## 2018-11-28 PROCEDURE — 85025 COMPLETE CBC W/AUTO DIFF WBC: CPT | Performed by: INTERNAL MEDICINE

## 2018-11-28 PROCEDURE — 83880 ASSAY OF NATRIURETIC PEPTIDE: CPT | Performed by: INTERNAL MEDICINE

## 2018-11-28 PROCEDURE — 93017 CV STRESS TEST TRACING ONLY: CPT

## 2018-11-28 PROCEDURE — 85379 FIBRIN DEGRADATION QUANT: CPT | Performed by: INTERNAL MEDICINE

## 2018-11-28 PROCEDURE — 0 TECHNETIUM TETROFOSMIN KIT: Performed by: INTERNAL MEDICINE

## 2018-11-28 PROCEDURE — 93016 CV STRESS TEST SUPVJ ONLY: CPT | Performed by: INTERNAL MEDICINE

## 2018-11-28 PROCEDURE — 80053 COMPREHEN METABOLIC PANEL: CPT | Performed by: INTERNAL MEDICINE

## 2018-11-28 PROCEDURE — 78452 HT MUSCLE IMAGE SPECT MULT: CPT | Performed by: INTERNAL MEDICINE

## 2018-11-28 PROCEDURE — 99214 OFFICE O/P EST MOD 30 MIN: CPT | Performed by: INTERNAL MEDICINE

## 2018-11-28 PROCEDURE — 93018 CV STRESS TEST I&R ONLY: CPT | Performed by: INTERNAL MEDICINE

## 2018-11-28 PROCEDURE — 25010000002 REGADENOSON 0.4 MG/5ML SOLUTION: Performed by: INTERNAL MEDICINE

## 2018-11-28 PROCEDURE — 71046 X-RAY EXAM CHEST 2 VIEWS: CPT | Performed by: INTERNAL MEDICINE

## 2018-11-28 PROCEDURE — 36415 COLL VENOUS BLD VENIPUNCTURE: CPT

## 2018-11-28 PROCEDURE — A9502 TC99M TETROFOSMIN: HCPCS | Performed by: INTERNAL MEDICINE

## 2018-11-28 RX ORDER — OMEPRAZOLE 40 MG/1
40 CAPSULE, DELAYED RELEASE ORAL DAILY
Qty: 30 CAPSULE | Refills: 3 | Status: SHIPPED | OUTPATIENT
Start: 2018-11-28 | End: 2020-07-05

## 2018-11-28 RX ORDER — SUCRALFATE ORAL 1 G/10ML
1 SUSPENSION ORAL 4 TIMES DAILY
Qty: 420 ML | Refills: 1 | Status: SHIPPED | OUTPATIENT
Start: 2018-11-28 | End: 2020-03-02

## 2018-11-28 RX ORDER — NITROGLYCERIN 0.4 MG/1
0.4 TABLET SUBLINGUAL
Status: DISCONTINUED | OUTPATIENT
Start: 2018-11-28 | End: 2020-09-08

## 2018-11-28 RX ORDER — SODIUM CHLORIDE 0.9 % (FLUSH) 0.9 %
10 SYRINGE (ML) INJECTION AS NEEDED
Status: DISCONTINUED | OUTPATIENT
Start: 2018-11-28 | End: 2020-09-08

## 2018-11-28 RX ADMIN — REGADENOSON 0.4 MG: 0.08 INJECTION, SOLUTION INTRAVENOUS at 11:38

## 2018-11-28 RX ADMIN — TETROFOSMIN 1 DOSE: 1.38 INJECTION, POWDER, LYOPHILIZED, FOR SOLUTION INTRAVENOUS at 11:38

## 2018-11-28 RX ADMIN — TETROFOSMIN 1 DOSE: 1.38 INJECTION, POWDER, LYOPHILIZED, FOR SOLUTION INTRAVENOUS at 10:35

## 2018-11-28 NOTE — PROGRESS NOTES
"Chest Pain (Discomfort )      HPI  Bernice Dunlap is a 63 y.o. female RTC in acute care:   \"I dont know. I have been better\". Notes over last one week has had a \"bad case of indigestion\".  Thought was from eating some \"beef\".  However, sx did not go away.  Pain started on week ago when woke up and felt nauseous/ sick at my stomach/ chills.  \"Indigestion\" persisted next 48 hours or so and noted then pain came on. Pain was a \"come and go\", no noted triggers.  Felt nauseated \"the whole time\". Pain was dull pain but notes it was in the back between shoulder blades.  Concurrently,  there was heaviness associated in chest.  Never had overt pain chest.  Stomach discomfort and indigestion seemed to improve over days when has shoulder pain and chest pressure.  There was no exertion as pt notes has really only been laying around for the last 5 days due to sx.  Sx really have not gone away at this point. This AM has discomfort between shoulder blades. Cannot figure what makes it better.  Has heaviness in chest this AM. Breathing well through all of this.  2 nights ago had some SOA when carried a bunch of bags up steps at home.  No sweating events when all this happens.   Meds: ~4 days ago went to pharmacy and got some Tums and some med for gas.  When uses Tums gets relief of heaviness in chest.  Shoulder sx do not go away with Tums.  Gas medicine does not help.   Chewed an ASA a few days ago and felt \"a little bit better\".    Has not been checking blood pressure at home at all in last week.  Numbers had been OK when checked at home. Has been 130/60-70.        Review of Systems   Constitution: Negative for chills, diaphoresis and fever.   HENT: Negative for hoarse voice and sore throat.    Cardiovascular: Positive for chest pain (heaviness). Negative for dyspnea on exertion, irregular heartbeat, leg swelling, near-syncope, palpitations and syncope.   Respiratory: Negative for cough and shortness of breath.    Musculoskeletal: " "Negative for muscle weakness.   Gastrointestinal: Positive for heartburn and nausea. Negative for abdominal pain, change in bowel habit, constipation, diarrhea and vomiting.   Neurological: Negative for dizziness, headaches and light-headedness.       The following portions of the patient's history were reviewed and updated as appropriate: allergies, current medications, past medical history and problem list.      Current Outpatient Medications:   •  ASPIRIN PO, Take 81 mg by mouth Daily., Disp: , Rfl:   •  diclofenac (VOLTAREN) 75 MG EC tablet, , Disp: , Rfl:   •  LYRICA 75 MG capsule, TAKE 1 CAPSULE BY MOUTH THREE TIMES DAILY, Disp: 90 capsule, Rfl: 2  •  metoprolol succinate XL (TOPROL-XL) 50 MG 24 hr tablet, TAKE 1 TABLET BY MOUTH DAILY, Disp: 90 tablet, Rfl: 0  •  psyllium (METAMUCIL) 58.6 % powder, Take  by mouth Daily., Disp: , Rfl: 12  •  rosuvastatin (CRESTOR) 40 MG tablet, Take 1 tablet by mouth Daily., Disp: 30 tablet, Rfl: 5  •  traZODone (DESYREL) 50 MG tablet, TAKE 1 TABLET BY MOUTH EVERY NIGHT AT BEDTIME, Disp: 30 tablet, Rfl: 0  •  Cholecalciferol (HM VITAMIN D3) 2000 units capsule, Take 2,000 Units by mouth Daily., Disp: 30 each, Rfl:   •  omeprazole (priLOSEC) 40 MG capsule, Take 1 capsule by mouth Daily., Disp: 30 capsule, Rfl: 3  •  sucralfate (CARAFATE) 1 GM/10ML suspension, Take 10 mL by mouth 4 (Four) Times a Day., Disp: 420 mL, Rfl: 1    Current Facility-Administered Medications:   •  nitroglycerin (NITROSTAT) SL tablet 0.4 mg, 0.4 mg, Sublingual, Q5 Min PRN, Jamar Clark MD  •  sodium chloride 0.9 % flush 10 mL, 10 mL, Intravenous, PRN, Jamar Clark MD    Vitals:    11/28/18 0810   BP: 172/98   Pulse: 71   Temp: 98.6 °F (37 °C)   SpO2: 99%   Weight: 71.7 kg (158 lb)   Height: 160 cm (62.99\")           Physical Exam   Constitutional: She is oriented to person, place, and time. She appears well-developed and well-nourished. No distress.   HENT:   Head: Normocephalic and atraumatic. "   Mouth/Throat: Oropharynx is clear and moist. No oropharyngeal exudate.   Large kay in mouth     Eyes: Conjunctivae are normal. Pupils are equal, round, and reactive to light. No scleral icterus.   Neck: Normal range of motion. Neck supple. No JVD present.   Cardiovascular: Normal rate, regular rhythm and normal heart sounds.   No murmur heard.  Pulmonary/Chest: Effort normal and breath sounds normal. No respiratory distress. She has no wheezes. She has no rales. She exhibits no tenderness and no crepitus.   Musculoskeletal: She exhibits no edema.   Lymphadenopathy:     She has no cervical adenopathy.   Neurological: She is alert and oriented to person, place, and time. No cranial nerve deficit. She exhibits normal muscle tone. Gait normal.   Psychiatric: She has a normal mood and affect. Her behavior is normal.   Vitals reviewed.      XR chest: Pain in shoulders between shoulder blades and chest heaviness; No prior for comparison  Lungs clear, no acute airspace disease.   No mass  No effusion  No cardiomegaly/ normal mediastinum      ECG 12 Lead  Date/Time: 11/30/2018 8:05 AM  Performed by: Bryce Morales MD  Authorized by: Bryce Morales MD   Comparison: compared with previous ECG from 6/23/2017  Comparison to previous ECG: New Q wave in III  Rhythm: sinus rhythm  Rate: normal  BPM: 68  ST Segments: ST segments normal  T Waves: T waves normal  QRS axis: normal  Clinical impression: normal ECG  Comments: QTc - 396  Indication - chest pressure, hx of ASCVD            Assessment/ Plan  Diagnoses and all orders for this visit:    Chest pressure  -     XR Chest PA & Lateral  -     omeprazole (priLOSEC) 40 MG capsule; Take 1 capsule by mouth Daily.  -     sucralfate (CARAFATE) 1 GM/10ML suspension; Take 10 mL by mouth 4 (Four) Times a Day.  -     ECG 12 Lead    Acute pain of both shoulders  -     XR Chest PA & Lateral  -     omeprazole (priLOSEC) 40 MG capsule; Take 1 capsule by mouth Daily.  -     sucralfate  (CARAFATE) 1 GM/10ML suspension; Take 10 mL by mouth 4 (Four) Times a Day.    Arteriosclerotic vascular disease  -     ECG 12 Lead    Benign essential hypertension  -     ECG 12 Lead    Gastric reflux  -     omeprazole (priLOSEC) 40 MG capsule; Take 1 capsule by mouth Daily.  -     sucralfate (CARAFATE) 1 GM/10ML suspension; Take 10 mL by mouth 4 (Four) Times a Day.        Return for Next scheduled follow up.      Discussion:  Bernice Dunlap is a 63 y.o. female with hx of ASCVD s/p CABG in 2008 and (-) stress in 2016 and GERD RTC in acute care with one week of L sided chest heaviness and pain between shoulder blades.  Pt is poor historian, but does give relatively atypical chest pain hx with no SOA/ diaphoresis, but does have nasuea associated.  Pt gets some relief in chest only with Tums.  EKG today shows new deep Q wave in lead III.  CXR clear. Exam benign.  Ultimately, I do suspect that this is all GI in nature, however, it is challenging to rule out cardiac involvement (ischemic or aortic enlargement) with EKG change/ HTN in office off pt baseline with hx given. Will send pt to chest pain unit today for eval after discussion with Dr. Clark via phone.  Will set pt up with increase in PPI dosing and sucralfate to use over next week and see if this resolves discomfort, once has eval with Cards.  Will f/u pt in short term in one week in office.

## 2018-11-28 NOTE — PROGRESS NOTES
Date of Hospital Visit:18  Encounter Provider: Jamar Clark MD  Place of Service: [facility]  Patient Name: Bernice Dunlap  :1955  Referral Provider: Bryce Morales MD    Chief complaint: CP    History of Present Illness:  's patient is a 63-year-old female with history of hypertension, hyperlipidemia, coronary disease, coronary bypass grafting, carotid artery stenosis.  She had history of coronary artery bypass grafting in  with single KIRA graft believe the left anterior descending, she typically comes in just when she's having problems and was seen 2017 with chest pain was also seen in 2016 around May with chest pain had a normal perfusion stress test.  But she now comes in about a week ago she ate at a friend's house that night developed these chills started having this chest discomfort like a heaviness its been fairly constant for that week.  She tried taking some Tums and gas medication that didn't help.  She also started having this discomfort in her shoulder blade that comes and goes maybe 5 times a day lasting a few seconds.  But then she developed shortness of breath with activity going up steps or walking no problem no orthopnea or PND no palpitations no near-syncope or syncope.  She was seen in her PCPs office today and then sent here for further evaluation.          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 Mammary artery to the LAD; 2007; nl stress in 2014   • HYSTERECTOMY     • SALPINGO OOPHORECTOMY Bilateral    • THORACOSCOPY      With Pleurodesis (Therapeutic) - 2012 after traumatic effusion; Dr. Tomas       Current Outpatient Medications on File Prior to Encounter   Medication Sig Dispense Refill   • ASPIRIN PO Take 81 mg by mouth Daily.     • Cholecalciferol (HM VITAMIN D3) 2000 units capsule Take 2,000 Units by mouth Daily. 30 each    • LYRICA 75 MG capsule TAKE 1 CAPSULE BY MOUTH THREE TIMES DAILY 90 capsule 2   • metoprolol succinate XL (TOPROL-XL) 50 MG 24 hr tablet TAKE 1 TABLET BY MOUTH DAILY 90 tablet 0   • omeprazole (priLOSEC) 40 MG capsule Take 1 capsule by mouth Daily. 30 capsule 3   • psyllium (METAMUCIL) 58.6 % powder Take  by mouth Daily.  12   • rosuvastatin (CRESTOR) 40 MG tablet Take 1 tablet by mouth Daily. 30 tablet 5   • sucralfate (CARAFATE) 1 GM/10ML suspension Take 10 mL by mouth 4 (Four) Times a Day. 420 mL 1   • traZODone (DESYREL) 50 MG tablet TAKE 1 TABLET BY MOUTH EVERY NIGHT AT BEDTIME 30 tablet 0   • diclofenac (VOLTAREN) 75 MG EC tablet      • [DISCONTINUED] methocarbamol (ROBAXIN) 500 MG tablet Take 500 mg by mouth.     • [DISCONTINUED] omeprazole (PRILOSEC) 20 MG capsule Take 1 capsule by mouth Daily. 30 capsule 5     No current facility-administered medications on file prior to encounter.        Social History     Socioeconomic History   • Marital status:      Spouse name: Not on file   • Number of children: 1   • Years of education: Not on file   • Highest education level: Not on file   Social Needs   • Financial resource strain: Not on file   • Food insecurity - worry: Not on file   • Food insecurity - inability: Not on  file   • Transportation needs - medical: Not on file   • Transportation needs - non-medical: Not on file   Occupational History   • Occupation: Assistant     Comment: Retired [employer]   • Occupation:      Comment: [employer]   Tobacco Use   • Smoking status: Former Smoker   • Smokeless tobacco: Never Used   • Tobacco comment: 6 pk/yr hx; occasionally smokes now; 2015 has quit totally   Substance and Sexual Activity   • Alcohol use: No   • Drug use: No   • Sexual activity: No     Partners: Male     Comment:  in past   Other Topics Concern   • Not on file   Social History Narrative    Diet - overall healthy,eating fruits and veggies, does eat on the run too often    Exercise - walking daily in past; variable    Caffeine - occasional tea       Family History   Problem Relation Age of Onset   • Heart disease Mother         Arteriosclerotic cardiovascular disease; dx'd in 50's   • Alcohol abuse Father         Alcoholism   • COPD Father    • Depression Sister    • Leukemia Sister    • Diabetes Sister         Type 2 Diabetes Mellitus   • Alcohol abuse Brother         Alcoholism   • Hypertension Brother         Benign Essential Hypertension   • Glaucoma Brother    • Hyperlipidemia Brother    • No Known Problems Son    • No Known Problems Maternal Grandmother    • No Known Problems Maternal Grandfather    • No Known Problems Paternal Grandmother    • No Known Problems Paternal Grandfather         REVIEW OF SYSTEMS:   All ROS was performed and is Negative except as outlined in HPI     Objective:     Vitals:    11/28/18 0954 11/28/18 0957   BP: (!) 206/92 (!) 215/94   BP Location: Left arm Right arm   Patient Position: Sitting Sitting   Pulse: 66    Resp: 16    SpO2: 99%      There is no height or weight on file to calculate BMI.      Physical Exam   Physical Exam   Constitutional: She is oriented to person, place, and time. She appears well-developed and well-nourished. No distress.   HENT:   Head:  Normocephalic.   Eyes: Conjunctivae are normal. Pupils are equal, round, and reactive to light. No scleral icterus.   Neck: Normal carotid pulses, no hepatojugular reflux and no JVD present. Carotid bruit is not present. No tracheal deviation, no edema and no erythema present. No thyromegaly present.   Cardiovascular: Normal rate, regular rhythm, S1 normal, S2 normal, normal heart sounds and intact distal pulses.  No extrasystoles are present. PMI is not displaced. Exam reveals no gallop, no distant heart sounds and no friction rub.   No murmur heard.  Pulses:       Carotid pulses are 2+ on the right side, and 2+ on the left side.       Radial pulses are 2+ on the right side, and 2+ on the left side.        Femoral pulses are 2+ on the right side, and 2+ on the left side.       Dorsalis pedis pulses are 2+ on the right side, and 2+ on the left side.        Posterior tibial pulses are 2+ on the right side, and 2+ on the left side.   Pulmonary/Chest: Effort normal and breath sounds normal. No respiratory distress. She has no decreased breath sounds. She has no wheezes. She has no rhonchi. She has no rales. She exhibits no tenderness.   Abdominal: Soft. Bowel sounds are normal. She exhibits no distension and no mass. There is no hepatosplenomegaly. There is no tenderness. There is no rebound and no guarding.   Musculoskeletal: She exhibits no edema, tenderness or deformity.   Neurological: She is alert and oriented to person, place, and time.   Skin: Skin is warm and dry. No rash noted. She is not diaphoretic. No cyanosis or erythema. No pallor. Nails show no clubbing.   Psychiatric: She has a normal mood and affect. Her speech is normal and behavior is normal. Judgment and thought content normal.       Lab Review:                        Results from last 7 days   Lab Units  11/28/18   1004   WBC 10*3/mm3  4.42*   HEMOGLOBIN g/dL  14.3   HEMATOCRIT %  42.4   PLATELETS 10*3/mm3  350                      @LABRCNT(bnp)@    I personally viewed and interpreted the patient's EKG/Telemetry data     Assessment:   1.  63-year-old female with atypical chest discomfort but obviously at higher risk given her history of coronary artery disease little concerned about the shortness of breath therefore she is to have a perfusion stress test today if her troponins are negative if that's unremarkable no further cardiovascular evaluation or treatment will have her follow back up with her PCP.  2.  History of coronary disease preserved left ventricular systolic function and previous single KIRA graft placed in 2008 as above.  4.  History of mild to moderate bilateral carotid artery stenosis.  3.  History of hypertension blood pressure slightly elevated today to follow this at home.  5.  Hyperlipidemia continue home therapy.                   Plan:

## 2018-11-28 NOTE — PROGRESS NOTES
Date of Office Visit: 18  Encounter Provider: Tuyet Rodríguez RN  Place of Service: [facility]  Patient Name: Bernice Dunlap  :1955  Referral Provider:Bryce Morales MD      Chief Complaint   Patient presents with   • Chest Pain     over 1week ago gail started having midsternal/epigastric discomfort radiatingto her back nonexertional, with nausea . No relief with anything and nothing makes it worse.She has noticed being soa with normal activities. Dull ache in back has been there for 2days and has 5min or less. Constant midsternal heaviness x1week.      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 Mammary artery to the LAD; ; nl stress in    • HYSTERECTOMY     • SALPINGO OOPHORECTOMY Bilateral    • THORACOSCOPY      With Pleurodesis (Therapeutic) -  after traumatic effusion; Dr. Tomas         Current Outpatient Medications on File Prior to Encounter   Medication Sig Dispense Refill   • ASPIRIN PO Take 81 mg by mouth Daily.     • Cholecalciferol (HM VITAMIN D3) 2000 units capsule Take 2,000 Units by mouth  Daily. 30 each    • LYRICA 75 MG capsule TAKE 1 CAPSULE BY MOUTH THREE TIMES DAILY 90 capsule 2   • metoprolol succinate XL (TOPROL-XL) 50 MG 24 hr tablet TAKE 1 TABLET BY MOUTH DAILY 90 tablet 0   • omeprazole (priLOSEC) 40 MG capsule Take 1 capsule by mouth Daily. 30 capsule 3   • psyllium (METAMUCIL) 58.6 % powder Take  by mouth Daily.  12   • rosuvastatin (CRESTOR) 40 MG tablet Take 1 tablet by mouth Daily. 30 tablet 5   • sucralfate (CARAFATE) 1 GM/10ML suspension Take 10 mL by mouth 4 (Four) Times a Day. 420 mL 1   • traZODone (DESYREL) 50 MG tablet TAKE 1 TABLET BY MOUTH EVERY NIGHT AT BEDTIME 30 tablet 0   • diclofenac (VOLTAREN) 75 MG EC tablet      • [DISCONTINUED] methocarbamol (ROBAXIN) 500 MG tablet Take 500 mg by mouth.     • [DISCONTINUED] omeprazole (PRILOSEC) 20 MG capsule Take 1 capsule by mouth Daily. 30 capsule 5     No current facility-administered medications on file prior to encounter.          Social History     Socioeconomic History   • Marital status:      Spouse name: Not on file   • Number of children: 1   • Years of education: Not on file   • Highest education level: Not on file   Social Needs   • Financial resource strain: Not on file   • Food insecurity - worry: Not on file   • Food insecurity - inability: Not on file   • Transportation needs - medical: Not on file   • Transportation needs - non-medical: Not on file   Occupational History   • Occupation: Assistant     Comment: Retired [employer]   • Occupation:      Comment: [employer]   Tobacco Use   • Smoking status: Former Smoker   • Smokeless tobacco: Never Used   • Tobacco comment: 6 pk/yr hx; occasionally smokes now; 2015 has quit totally   Substance and Sexual Activity   • Alcohol use: No   • Drug use: No   • Sexual activity: No     Partners: Male     Comment:  in past   Other Topics Concern   • Not on file   Social History Narrative    Diet - overall healthy,eating fruits and veggies, does eat  on the run too often    Exercise - walking daily in past; variable    Caffeine - occasional tea       Family History   Problem Relation Age of Onset   • Heart disease Mother         Arteriosclerotic cardiovascular disease; dx'd in 50's   • Alcohol abuse Father         Alcoholism   • COPD Father    • Depression Sister    • Leukemia Sister    • Diabetes Sister         Type 2 Diabetes Mellitus   • Alcohol abuse Brother         Alcoholism   • Hypertension Brother         Benign Essential Hypertension   • Glaucoma Brother    • Hyperlipidemia Brother    • No Known Problems Son    • No Known Problems Maternal Grandmother    • No Known Problems Maternal Grandfather    • No Known Problems Paternal Grandmother    • No Known Problems Paternal Grandfather          ROS    Procedures    Procedures        Objective:    BP (!) 215/94 (BP Location: Right arm, Patient Position: Sitting)   Pulse 66   Resp 16   SpO2 99%        Physical Exam  Physical Exam        Assessment:             Plan:

## 2018-11-30 PROCEDURE — 93000 ELECTROCARDIOGRAM COMPLETE: CPT | Performed by: INTERNAL MEDICINE

## 2018-12-05 DIAGNOSIS — I10 BENIGN ESSENTIAL HYPERTENSION: ICD-10-CM

## 2018-12-06 RX ORDER — METOPROLOL SUCCINATE 50 MG/1
TABLET, EXTENDED RELEASE ORAL
Qty: 90 TABLET | Refills: 0 | Status: SHIPPED | OUTPATIENT
Start: 2018-12-06 | End: 2019-04-13 | Stop reason: SDUPTHER

## 2018-12-12 DIAGNOSIS — M79.7 FIBROMYALGIA: ICD-10-CM

## 2018-12-13 NOTE — TELEPHONE ENCOUNTER
No Consent or abdulaziz on file   Last ov 11/28/18  Ok to refill ? Will run abdulaziz and patient can come in and fill out consent

## 2019-01-07 DIAGNOSIS — G47.09 OTHER INSOMNIA: ICD-10-CM

## 2019-01-07 RX ORDER — TRAZODONE HYDROCHLORIDE 50 MG/1
TABLET ORAL
Qty: 30 TABLET | Refills: 0 | Status: SHIPPED | OUTPATIENT
Start: 2019-01-07 | End: 2019-03-25 | Stop reason: SDUPTHER

## 2019-01-22 DIAGNOSIS — E55.9 VITAMIN D DEFICIENCY: ICD-10-CM

## 2019-01-22 DIAGNOSIS — R73.03 PRE-DIABETES: ICD-10-CM

## 2019-01-22 DIAGNOSIS — Z00.00 HEALTHCARE MAINTENANCE: Primary | ICD-10-CM

## 2019-01-22 DIAGNOSIS — I10 BENIGN ESSENTIAL HYPERTENSION: ICD-10-CM

## 2019-01-22 DIAGNOSIS — E78.2 MIXED HYPERLIPIDEMIA: ICD-10-CM

## 2019-01-22 DIAGNOSIS — M79.7 FIBROMYALGIA: ICD-10-CM

## 2019-02-09 LAB
25(OH)D3+25(OH)D2 SERPL-MCNC: 21 NG/ML (ref 30–100)
ALBUMIN SERPL-MCNC: 4.4 G/DL (ref 3.5–5.2)
ALBUMIN/GLOB SERPL: 1.4 G/DL
ALP SERPL-CCNC: 74 U/L (ref 39–117)
ALT SERPL-CCNC: 14 U/L (ref 1–33)
APPEARANCE UR: CLEAR
AST SERPL-CCNC: 13 U/L (ref 1–32)
BACTERIA #/AREA URNS HPF: NORMAL /HPF
BASOPHILS # BLD AUTO: 0.03 10*3/MM3 (ref 0–0.2)
BASOPHILS NFR BLD AUTO: 0.6 % (ref 0–1.5)
BILIRUB SERPL-MCNC: 0.6 MG/DL (ref 0.1–1.2)
BILIRUB UR QL STRIP: NEGATIVE
BUN SERPL-MCNC: 12 MG/DL (ref 8–23)
BUN/CREAT SERPL: 17.1 (ref 7–25)
CALCIUM SERPL-MCNC: 10 MG/DL (ref 8.6–10.5)
CHLORIDE SERPL-SCNC: 101 MMOL/L (ref 98–107)
CHOLEST SERPL-MCNC: 207 MG/DL (ref 0–200)
CO2 SERPL-SCNC: 27 MMOL/L (ref 22–29)
COLOR UR: YELLOW
CREAT SERPL-MCNC: 0.7 MG/DL (ref 0.57–1)
EOSINOPHIL # BLD AUTO: 0.09 10*3/MM3 (ref 0–0.7)
EOSINOPHIL NFR BLD AUTO: 1.8 % (ref 0.3–6.2)
EPI CELLS #/AREA URNS HPF: NORMAL /HPF
ERYTHROCYTE [DISTWIDTH] IN BLOOD BY AUTOMATED COUNT: 12.5 % (ref 11.7–13)
GLOBULIN SER CALC-MCNC: 3.2 GM/DL
GLUCOSE SERPL-MCNC: 93 MG/DL (ref 65–99)
GLUCOSE UR QL: NEGATIVE
HBA1C MFR BLD: 5.8 % (ref 4.8–5.6)
HCT VFR BLD AUTO: 43.4 % (ref 35.6–45.5)
HDLC SERPL-MCNC: 47 MG/DL (ref 40–60)
HGB BLD-MCNC: 14.6 G/DL (ref 11.9–15.5)
HGB UR QL STRIP: NEGATIVE
IMM GRANULOCYTES # BLD AUTO: 0 10*3/MM3 (ref 0–0.03)
IMM GRANULOCYTES NFR BLD AUTO: 0 % (ref 0–0.5)
KETONES UR QL STRIP: NEGATIVE
LDLC SERPL CALC-MCNC: 143 MG/DL (ref 0–100)
LEUKOCYTE ESTERASE UR QL STRIP: NEGATIVE
LYMPHOCYTES # BLD AUTO: 2.22 10*3/MM3 (ref 0.9–4.8)
LYMPHOCYTES NFR BLD AUTO: 43.2 % (ref 19.6–45.3)
MCH RBC QN AUTO: 30.7 PG (ref 26.9–32)
MCHC RBC AUTO-ENTMCNC: 33.6 G/DL (ref 32.4–36.3)
MCV RBC AUTO: 91.2 FL (ref 80.5–98.2)
MICRO URNS: NORMAL
MICRO URNS: NORMAL
MONOCYTES # BLD AUTO: 0.31 10*3/MM3 (ref 0.2–1.2)
MONOCYTES NFR BLD AUTO: 6 % (ref 5–12)
MUCOUS THREADS URNS QL MICRO: PRESENT /HPF
NEUTROPHILS # BLD AUTO: 2.49 10*3/MM3 (ref 1.9–8.1)
NEUTROPHILS NFR BLD AUTO: 48.4 % (ref 42.7–76)
NITRITE UR QL STRIP: NEGATIVE
PH UR STRIP: 5.5 [PH] (ref 5–7.5)
PLATELET # BLD AUTO: 381 10*3/MM3 (ref 140–500)
POTASSIUM SERPL-SCNC: 4.6 MMOL/L (ref 3.5–5.2)
PROT SERPL-MCNC: 7.6 G/DL (ref 6–8.5)
PROT UR QL STRIP: NEGATIVE
RBC # BLD AUTO: 4.76 10*6/MM3 (ref 3.9–5.2)
RBC #/AREA URNS HPF: NORMAL /HPF
SODIUM SERPL-SCNC: 140 MMOL/L (ref 136–145)
SP GR UR: 1.02 (ref 1–1.03)
TRIGL SERPL-MCNC: 85 MG/DL (ref 0–150)
TSH SERPL DL<=0.005 MIU/L-ACNC: 0.9 MIU/ML (ref 0.27–4.2)
URINALYSIS REFLEX: NORMAL
UROBILINOGEN UR STRIP-MCNC: 0.2 MG/DL (ref 0.2–1)
VLDLC SERPL CALC-MCNC: 17 MG/DL (ref 5–40)
WBC # BLD AUTO: 5.14 10*3/MM3 (ref 4.5–10.7)
WBC #/AREA URNS HPF: NORMAL /HPF

## 2019-02-15 ENCOUNTER — OFFICE VISIT (OUTPATIENT)
Dept: INTERNAL MEDICINE | Facility: CLINIC | Age: 64
End: 2019-02-15

## 2019-02-15 VITALS
OXYGEN SATURATION: 96 % | WEIGHT: 158 LBS | HEIGHT: 64 IN | TEMPERATURE: 99 F | HEART RATE: 68 BPM | DIASTOLIC BLOOD PRESSURE: 88 MMHG | RESPIRATION RATE: 12 BRPM | BODY MASS INDEX: 26.98 KG/M2 | SYSTOLIC BLOOD PRESSURE: 138 MMHG

## 2019-02-15 DIAGNOSIS — E55.9 VITAMIN D DEFICIENCY: ICD-10-CM

## 2019-02-15 DIAGNOSIS — N20.0 NEPHROLITHIASIS: ICD-10-CM

## 2019-02-15 DIAGNOSIS — E78.2 MIXED HYPERLIPIDEMIA: ICD-10-CM

## 2019-02-15 DIAGNOSIS — K21.9 GASTRIC REFLUX: ICD-10-CM

## 2019-02-15 DIAGNOSIS — L30.1 VESICULAR PALMOPLANTAR ECZEMA: ICD-10-CM

## 2019-02-15 DIAGNOSIS — R73.03 PRE-DIABETES: ICD-10-CM

## 2019-02-15 DIAGNOSIS — I65.23 BILATERAL CAROTID ARTERY STENOSIS: ICD-10-CM

## 2019-02-15 DIAGNOSIS — M85.89 OSTEOPENIA OF MULTIPLE SITES: ICD-10-CM

## 2019-02-15 DIAGNOSIS — Z00.00 HEALTHCARE MAINTENANCE: Primary | ICD-10-CM

## 2019-02-15 DIAGNOSIS — G43.009 MIGRAINE WITHOUT AURA AND WITHOUT STATUS MIGRAINOSUS, NOT INTRACTABLE: ICD-10-CM

## 2019-02-15 DIAGNOSIS — I70.90 ARTERIOSCLEROTIC VASCULAR DISEASE: ICD-10-CM

## 2019-02-15 DIAGNOSIS — I10 BENIGN ESSENTIAL HYPERTENSION: ICD-10-CM

## 2019-02-15 DIAGNOSIS — M79.7 FIBROMYALGIA: ICD-10-CM

## 2019-02-15 PROBLEM — G47.09 OTHER INSOMNIA: Status: RESOLVED | Noted: 2017-10-10 | Resolved: 2019-02-15

## 2019-02-15 PROCEDURE — 90471 IMMUNIZATION ADMIN: CPT | Performed by: INTERNAL MEDICINE

## 2019-02-15 PROCEDURE — 90674 CCIIV4 VAC NO PRSV 0.5 ML IM: CPT | Performed by: INTERNAL MEDICINE

## 2019-02-15 PROCEDURE — 99396 PREV VISIT EST AGE 40-64: CPT | Performed by: INTERNAL MEDICINE

## 2019-02-15 PROCEDURE — 90472 IMMUNIZATION ADMIN EACH ADD: CPT | Performed by: INTERNAL MEDICINE

## 2019-02-15 PROCEDURE — 90632 HEPA VACCINE ADULT IM: CPT | Performed by: INTERNAL MEDICINE

## 2019-02-15 RX ORDER — PREGABALIN 25 MG/1
25 CAPSULE ORAL 3 TIMES DAILY
Qty: 21 CAPSULE | Refills: 0 | Status: SHIPPED | OUTPATIENT
Start: 2019-02-15 | End: 2019-02-15 | Stop reason: SDUPTHER

## 2019-02-15 RX ORDER — PREGABALIN 50 MG/1
50 CAPSULE ORAL 3 TIMES DAILY
Qty: 21 CAPSULE | Refills: 0 | Status: SHIPPED | OUTPATIENT
Start: 2019-02-15 | End: 2019-02-15 | Stop reason: SDUPTHER

## 2019-02-15 RX ORDER — PREGABALIN 50 MG/1
50 CAPSULE ORAL 3 TIMES DAILY
Qty: 21 CAPSULE | Refills: 0 | Status: SHIPPED | OUTPATIENT
Start: 2019-02-15 | End: 2019-02-22

## 2019-02-15 RX ORDER — PREGABALIN 25 MG/1
25 CAPSULE ORAL 3 TIMES DAILY
Qty: 21 CAPSULE | Refills: 0 | Status: SHIPPED | OUTPATIENT
Start: 2019-02-15 | End: 2019-02-22

## 2019-02-15 NOTE — PROGRESS NOTES
"Subjective     Chief Complaint   Patient presents with   • Annual Exam     review of medical issues        HPI:  Bernice Dunlap is a 63 y.o. female RTC In yearly AWV, review of medical issues:   1. ASCVD s/p CABG in 2008 and (-) stress in 2016 and GERD RTC in acute care with one week of L sided chest heaviness in 11/2018 - saw Cards and had stress test that was negative.  Sx resolved after a few days. Not sure why occurred and why resolved.   2. Nephrolithiasis - new issue 7/2018, seen in ED at [facility].  Suspected passed stone and noted retained stone in L kidney on CT.  Pt asx'ic at this time.  Admits has some days where has severe pain in back and will resolve with urination. But, has not had that issue in months.   3. HTN - on one drug. Checks pressure 1-2x/ week. Has HA when pressure is up. \"I can tell\".  Has not had many HA.   4. Carotid Artery Stenosis, 16-49% B on 1/2018 U/S - 1-15% --> 16-49% B 1/2018 - noTIA sx noted.  Has not had U/S since 1/2018.    5. HLD - off Livalo due to insurance, now on Crestor. No issues with med.   6. Pre-DM - weight is stable.  \"I would like to loose some weight, but it seems like I cant\".  Insurance has program with dietician and has gotten some info from her, but started that one week ago.  Exercise is gym 2x/ week \"when I can get there\".   7. Sudden onset insomnia 10/2017, etiology unclear but suspected poor sleep hygiene habits - notes still has terrible issues with sleep.  Has not slept in last few nights.  Did use trazodone and did help initially but then sx will fluctuate.  Is still taking trazodone. In last week has been a bad week.  Notes sweet foods will keep her up.  Caffeine intake is rare.   8. BRBPR noted on TP several events in 2017 - no recurrence with Metamucil (3x/ week) and daily oatmeal. No issues.   9. Fibromyalgia - controlled on Lyrica. Has worked well over time.  Notes has had some vision issues with blurriness and notes \"has had it\".  Saw optometry in " "~2017 and got new glasses but they did nto help.  \"Looks like my windshield is dirty but it is my eyes\". Thinks is more than aging and wonders if side effect of med.  Has not had eye exam in a few years. Feels like FM has been in remission for a while now. Recall as well pt is in much less stressful living situation and has been more stable in last few years.   10. Pomphylox eczema - off all topicals at this point and notes 'It is gone. It has been gone\".   11. Migraine HA - CIARA for years.   12. Pilar Cyst - on scalp, declines plastic surgery eval. \"It comes and gores but does not bother me. I often dont know it is there\".   13. Gastric Reflux - no issues. \"I dont have any heartburn. I used to have heartburn a lot, but I dont have any heartburn\".  Will use carafate once weekly, maybe.  Much less use since on PPI daily    The following portions of the patient's history were reviewed and updated as appropriate: allergies, current medications, past family history, past medical history, past social history, past surgical history and problem list.    Review of Systems   Constitution: Negative for chills, fever, malaise/fatigue, weight gain and weight loss.   HENT: Negative for congestion, hearing loss, hoarse voice, odynophagia and sore throat.    Eyes: Positive for blurred vision. Negative for discharge, double vision, pain and redness.   Cardiovascular: Negative for chest pain, dyspnea on exertion, irregular heartbeat, leg swelling, near-syncope, palpitations and syncope.   Respiratory: Negative for cough and shortness of breath.    Hematologic/Lymphatic: Negative for bleeding problem. Does not bruise/bleed easily.   Skin: Negative for rash and suspicious lesions.   Musculoskeletal: Positive for falls. Negative for back pain, joint pain (\"I fall, I stagger\". \"It just come on\". ), joint swelling, muscle cramps, muscle weakness and myalgias.   Gastrointestinal: Negative for constipation, diarrhea, dysphagia, heartburn " "(intermittent sx. ), nausea and vomiting.   Genitourinary: Negative for bladder incontinence, dysuria, frequency, hematuria and hesitancy.   Neurological: Negative for dizziness, headaches, light-headedness and vertigo (denies spinning).   Psychiatric/Behavioral: Negative for depression. The patient does not have insomnia and is not nervous/anxious.    Allergic/Immunologic: Negative for environmental allergies and persistent infections.       Patient Care Team:  Bryce Morales MD as PCP - General  Bryce Morales MD as PCP - Family Medicine    Recent Hospitalizations: No recent hospitalization(s).    Depression Screen:   No flowsheet data found.    Functional and Cognitive Screening:  No flowsheet data found.    Does the patient have evidence of cognitive impairment? no    Taking ASA appropriately as indicated    Vitals:    02/15/19 1350   BP: 138/88   Pulse: 68   Resp: 12   Temp: 99 °F (37.2 °C)   SpO2: 96%   Weight: 71.7 kg (158 lb)   Height: 162.6 cm (64\")       Body mass index is 27.12 kg/m².    Visual Acuity:  No exam data present    Advanced Care Planning:  has an advance directive - a copy HAS NOT been provided. Have asked the patient to send this to us to add to record.    Objective   Physical Exam   Constitutional: She is oriented to person, place, and time. She appears well-developed and well-nourished. No distress.   HENT:   Head: Normocephalic and atraumatic.   Right Ear: Hearing, tympanic membrane, external ear and ear canal normal.   Left Ear: Hearing, tympanic membrane, external ear and ear canal normal.   Nose: Nose normal.   Mouth/Throat: Uvula is midline, oropharynx is clear and moist and mucous membranes are normal. Mucous membranes are not pale, not dry and not cyanotic. Oral lesions (large hard palate kay noted) present. Normal dentition. No oropharyngeal exudate or posterior oropharyngeal erythema.   Eyes: Conjunctivae, EOM and lids are normal. Pupils are equal, round, and reactive to light. "   Neck: Trachea normal, normal range of motion and full passive range of motion without pain. Neck supple. Carotid bruit is not present. No thyroid mass and no thyromegaly present.   Cardiovascular: Normal rate, regular rhythm, S1 normal, S2 normal, normal heart sounds and intact distal pulses. Exam reveals no gallop and no friction rub.   No murmur heard.  Pulses:       Radial pulses are 2+ on the right side, and 2+ on the left side.        Dorsalis pedis pulses are 2+ on the right side, and 2+ on the left side.        Posterior tibial pulses are 2+ on the right side, and 2+ on the left side.   Pulmonary/Chest: Effort normal and breath sounds normal. No respiratory distress. She has no wheezes. She has no rhonchi. She has no rales. She exhibits no mass. Right breast exhibits no inverted nipple, no mass, no nipple discharge and no skin change. Left breast exhibits no inverted nipple, no mass, no nipple discharge and no skin change.   Chaperone present     Abdominal: Soft. Normal appearance and bowel sounds are normal. She exhibits no distension and no mass. There is no hepatosplenomegaly. There is no tenderness. There is no rebound and no guarding.   Musculoskeletal: Normal range of motion. She exhibits no edema.     Vascular Status -  Her right foot exhibits normal foot vasculature  and no edema. Her left foot exhibits normal foot vasculature  and no edema.  Lymphadenopathy:     She has no cervical adenopathy.     She has no axillary adenopathy.        Right: No inguinal adenopathy present.        Left: No inguinal adenopathy present.   Neurological: She is alert and oriented to person, place, and time. She has normal strength and normal reflexes. She displays no tremor. No cranial nerve deficit or sensory deficit. She exhibits normal muscle tone. Gait normal.   Skin: Skin is warm and dry. No rash noted.        Psychiatric: She has a normal mood and affect. Her behavior is normal. Thought content normal. Cognition  and memory are normal.   Vitals reviewed.      Assessment/Plan   Problems Addressed this Visit     Arteriosclerotic vascular disease    Benign essential hypertension    Fibromyalgia    Relevant Medications    pregabalin (LYRICA) 25 MG capsule    pregabalin (LYRICA) 50 MG capsule    Hyperlipidemia    Migraine    Relevant Medications    pregabalin (LYRICA) 25 MG capsule    pregabalin (LYRICA) 50 MG capsule    Vesicular palmoplantar eczema    Pre-diabetes    Vitamin D deficiency    Bilateral carotid artery stenosis    Gastric reflux    Nephrolithiasis    Osteopenia of multiple sites      Other Visit Diagnoses     Healthcare maintenance    -  Primary    Relevant Orders    Hepatitis A Vaccine Adult IM (Completed)    Flucelvax Quad=>4Years (PFS) (Completed)    Mammo Screening Bilateral With CAD              Diagnoses and all orders for this visit:    Healthcare maintenance  -     Hepatitis A Vaccine Adult IM  -     Flucelvax Quad=>4Years (PFS)  -     Mammo Screening Bilateral With CAD; Future    Fibromyalgia  -     Discontinue: pregabalin (LYRICA) 50 MG capsule; Take 1 capsule by mouth 3 (Three) Times a Day for 7 days.  -     Discontinue: pregabalin (LYRICA) 25 MG capsule; Take 1 capsule by mouth 3 (Three) Times a Day for 7 days. The D/C.  -     pregabalin (LYRICA) 25 MG capsule; Take 1 capsule by mouth 3 (Three) Times a Day for 7 days. The D/C.  -     pregabalin (LYRICA) 50 MG capsule; Take 1 capsule by mouth 3 (Three) Times a Day for 7 days.    Arteriosclerotic vascular disease    Benign essential hypertension    Bilateral carotid artery stenosis    Gastric reflux    Mixed hyperlipidemia    Migraine without aura and without status migrainosus, not intractable    Nephrolithiasis    Pre-diabetes    Vesicular palmoplantar eczema    Vitamin D deficiency    Osteopenia of multiple sites        Bernice Germán is a 63 y.o. female RTC In yearly AWV, review of medical issues:   1. ASCVD s/p CABG in 2008 and (-) stress in 2016 -  stress test negative 11/2018 after acute visit here.  No angina sx at this time.   2. Nephrolithiasis - new issue 7/2018, seen in ED at [facility].  Suspected passed stone and noted retained stone in L kidney on CT.  Pt asx'ic at this time. U/A clear.   3. HTN - controlled on one drug. BMP OK.   4. Carotid Artery Stenosis, 16-49% B on 1/2018 U/S - 1-15% --> 16-49% B 1/2018 - asx'ic.  Repeat U/S 1/2020.     progression since 2013. Off tobacco and remains on ASA and high dose statin daily. Recheck U/S in 1/2019.   5. HLD - LDL reasonable on high dose Crestor. LFT's OK. C/W same.  6. Pre-DM - A1C mild elevation but progressive today.  working with dietician through insurance plan.  Is at gym but needs 3-5x/ week commitment.  Pt to work on mods and trend weight and A1C at f/u.   7. Sudden onset insomnia 10/2017, etiology unclear but suspected poor sleep hygiene habits - on Trazodone with variable control. Mood good. Will c/w same med and see how pt does off Lyrica.   8. Hx of  BRBPR noted on TP several events in 2017 - no recurrence with Metamucil (3x/ week) and daily oatmeal.  9. Fibromyalgia - long term controlled on Lyrica. Pt has some blurry vision issues that were not responsive to Rx glasses and some intermittent dizziness issues, both of which are common side effects of Lyrica. Given pt remission of FM sx and present reduced stress after divorce and less stressful living situation, I think we can try off Lyrica and see if above potential side effects resolve. Will need to see ophthalmologist if blurry vision continues.   10. Pomphylox eczema - resolved, off all topicals at this time.    11. Migraine HA - CIARA for years.   12. Pilar Cyst - on scalp, declines plastic surgery eval. Stable.  13. Vitamin D deficiency - persists with slow recovery.  Can increase to 4000 I.U. Daily at this time.  Trend levels next year.    14. Gastric Reflux - controlled on PPI daily, uses carafate once weekly. Monitor.   15. HM - labs d/w  pt; Flu/Tdap / Pvax - UTD; Hep A #1 today; Shingrix at pharmacy; C-scope 2010 (-) --> 10 years; Mammo due, refer; Pap D/C'd s/p LEONORA; DEXA with mild osteopenia, low FRAX, repeat 2/2021; Hep C Ab (-) 2014; add more exercise    Return in about 6 weeks (around 3/29/2019) for Recheck.          Current Outpatient Medications:   •  ASPIRIN PO, Take 81 mg by mouth Daily., Disp: , Rfl:   •  Cholecalciferol (HM VITAMIN D3) 2000 units capsule, Take 2,000 Units by mouth Daily., Disp: 30 each, Rfl:   •  metoprolol succinate XL (TOPROL-XL) 50 MG 24 hr tablet, TAKE 1 TABLET BY MOUTH DAILY, Disp: 90 tablet, Rfl: 0  •  omeprazole (priLOSEC) 40 MG capsule, Take 1 capsule by mouth Daily., Disp: 30 capsule, Rfl: 3  •  psyllium (METAMUCIL) 58.6 % powder, Take  by mouth Daily., Disp: , Rfl: 12  •  rosuvastatin (CRESTOR) 40 MG tablet, Take 1 tablet by mouth Daily., Disp: 30 tablet, Rfl: 5  •  sucralfate (CARAFATE) 1 GM/10ML suspension, Take 10 mL by mouth 4 (Four) Times a Day., Disp: 420 mL, Rfl: 1  •  traZODone (DESYREL) 50 MG tablet, TAKE 1 TABLET BY MOUTH EVERY NIGHT AT BEDTIME, Disp: 30 tablet, Rfl: 0  •  pregabalin (LYRICA) 25 MG capsule, Take 1 capsule by mouth 3 (Three) Times a Day for 7 days. The D/C., Disp: 21 capsule, Rfl: 0  •  pregabalin (LYRICA) 50 MG capsule, Take 1 capsule by mouth 3 (Three) Times a Day for 7 days., Disp: 21 capsule, Rfl: 0    Current Facility-Administered Medications:   •  nitroglycerin (NITROSTAT) SL tablet 0.4 mg, 0.4 mg, Sublingual, Q5 Min PRN, Jamar Clark MD  •  sodium chloride 0.9 % flush 10 mL, 10 mL, Intravenous, PRN, Jamar Clark MD

## 2019-03-25 DIAGNOSIS — G47.09 OTHER INSOMNIA: ICD-10-CM

## 2019-03-25 RX ORDER — TRAZODONE HYDROCHLORIDE 50 MG/1
TABLET ORAL
Qty: 30 TABLET | Refills: 0 | Status: SHIPPED | OUTPATIENT
Start: 2019-03-25 | End: 2019-10-25 | Stop reason: SDUPTHER

## 2019-04-13 DIAGNOSIS — I10 BENIGN ESSENTIAL HYPERTENSION: ICD-10-CM

## 2019-04-15 RX ORDER — METOPROLOL SUCCINATE 50 MG/1
TABLET, EXTENDED RELEASE ORAL
Qty: 90 TABLET | Refills: 0 | Status: SHIPPED | OUTPATIENT
Start: 2019-04-15 | End: 2019-07-30 | Stop reason: SDUPTHER

## 2019-05-29 ENCOUNTER — OFFICE VISIT (OUTPATIENT)
Dept: CARDIOLOGY | Facility: CLINIC | Age: 64
End: 2019-05-29

## 2019-05-29 VITALS
HEIGHT: 64 IN | WEIGHT: 156.4 LBS | BODY MASS INDEX: 26.7 KG/M2 | DIASTOLIC BLOOD PRESSURE: 72 MMHG | SYSTOLIC BLOOD PRESSURE: 128 MMHG | HEART RATE: 80 BPM | OXYGEN SATURATION: 98 %

## 2019-05-29 DIAGNOSIS — E78.2 MIXED HYPERLIPIDEMIA: ICD-10-CM

## 2019-05-29 DIAGNOSIS — I25.10 CORONARY ARTERY DISEASE INVOLVING NATIVE CORONARY ARTERY OF NATIVE HEART WITHOUT ANGINA PECTORIS: ICD-10-CM

## 2019-05-29 DIAGNOSIS — I10 BENIGN ESSENTIAL HYPERTENSION: Primary | ICD-10-CM

## 2019-05-29 PROCEDURE — 93000 ELECTROCARDIOGRAM COMPLETE: CPT | Performed by: NURSE PRACTITIONER

## 2019-05-29 PROCEDURE — 99214 OFFICE O/P EST MOD 30 MIN: CPT | Performed by: NURSE PRACTITIONER

## 2019-05-29 RX ORDER — EZETIMIBE 10 MG/1
10 TABLET ORAL DAILY
Qty: 30 TABLET | Refills: 11 | Status: SHIPPED | OUTPATIENT
Start: 2019-05-29 | End: 2020-03-02

## 2019-05-29 NOTE — PROGRESS NOTES
"  Date of Office Visit: 2019  Encounter Provider: BILL Alcocer  Place of Service: [facility] CARDIOLOGY  Patient Name: Bernice Dunlap  :1955    Chief Complaint   Patient presents with   • Follow-up     CEC 2018   :     HPI: Bernice Dunlap is a 64 y.o. female, new to me, who presents today for follow-up.  Old records have been obtained and reviewed by me.  She is a patient of Dr. Contreras's with a past cardiac history significant for hyperlipidemia, hypertension, carotid artery disease, and coronary artery disease.  She had a myocardial infarction in  and a CABG x1 in .  She was last seen in the office on 2017 and at that time she was doing well with no complaints of angina or heart failure.  She was reporting heartburn and a pressure in her chest.  However, she was walking regularly without any difficulty and the symptoms were felt to be related to reflux.  She was advised to follow-up in 1 year.  In 2018, she was evaluated in the chest pain unit for chest discomfort, shoulder blade discomfort, and shortness of breath with activity.  She underwent a myocardial perfusion study which was normal.  No further cardiovascular evaluation or treatment was recommended and she was advised to follow-up with her PCP.  She has not followed-up in our office since that time.    Overall she has been doing well from a cardiac standpoint.  She denies any chest pain or edema.  She does have occasional shortness of air on exertion; ever, she states that this randomly comes on.  Some days she is fine and other days she notices it more.  Nothing seems to make it better or worse.  She attributes this to weather changes as well as her fibromyalgia.  She reports that she will occasionally feel her heart skip a beat.  She states that she is \"always lightheaded\" and she had an episode several months back she felt like everything was spinning.  She has not had this " happen since and she denies any syncope.  She admits that her diet is terrible.  She eats out a lot and eats a lot of processed and prepackaged foods.  She does participate in exercise and states that she tries to walk every day.     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 Mammary artery to the LAD; ; nl stress in    • HYSTERECTOMY     • SALPINGO OOPHORECTOMY Bilateral    • THORACOSCOPY      With Pleurodesis (Therapeutic) -  after traumatic effusion; Dr. Tomas       Social History     Socioeconomic History   • Marital status:      Spouse name: Not on file   • Number of children: 1   • Years of education: Not on file   • Highest education level: Not on file   Occupational History   • Occupation: Assistant     Comment: Retired [employer]   • Occupation:      Comment: [employer]   Tobacco Use   • Smoking status: Current Some Day Smoker     Last attempt to quit:      Years since quittin.4   • Smokeless tobacco: Never Used   • Tobacco comment: 6 pk/yr hx; occasionally smokes now   Substance and Sexual Activity   • Alcohol use: No   • Drug use: No   • Sexual activity: Defer     Partners: Male    Lifestyle   • Physical activity:     Days per week: 2 days     Minutes per session: 50 min   • Stress: Not on file   Social History Narrative    Diet - overall healthy,eating fruits and veggies, does eat on the run too often    Exercise - walking daily in past; variable in 2018 with 2x/ week at gym    Caffeine - occasional tea       Family History   Problem Relation Age of Onset   • Heart disease Mother         Arteriosclerotic cardiovascular disease; dx'd in 50's   • Alcohol abuse Father         Alcoholism   • COPD Father    • Depression Sister    • Leukemia Sister    • Diabetes Sister         Type 2 Diabetes Mellitus   • Alcohol abuse Brother         Alcoholism   • Hypertension Brother         Benign Essential Hypertension   • Glaucoma Brother    • Hyperlipidemia Brother    • No Known Problems Son    • No Known Problems Maternal Grandmother    • No Known Problems Maternal Grandfather    • No Known Problems Paternal Grandmother    • No Known Problems Paternal Grandfather        Review of Systems   Constitution: Positive for malaise/fatigue. Negative for chills and fever.   Cardiovascular: Positive for dyspnea on exertion and palpitations. Negative for chest pain, leg swelling, near-syncope, orthopnea, paroxysmal nocturnal dyspnea and syncope.   Respiratory: Negative for cough and shortness of breath.    Musculoskeletal: Negative for joint pain, joint swelling and myalgias.   Gastrointestinal: Negative for abdominal pain, diarrhea, melena, nausea and vomiting.   Genitourinary: Negative for frequency and hematuria.   Neurological: Positive for light-headedness. Negative for numbness, paresthesias and seizures.   Allergic/Immunologic: Negative.    All other systems reviewed and are negative.      Allergies   Allergen Reactions   • Imitrex [Sumatriptan] Other (See Comments)     MI         Current Outpatient Medications:   •  ASPIRIN PO, Take 81 mg by mouth Daily., Disp: , Rfl:   •  metoprolol succinate XL (TOPROL-XL)  "50 MG 24 hr tablet, TAKE 1 TABLET BY MOUTH DAILY, Disp: 90 tablet, Rfl: 0  •  omeprazole (priLOSEC) 40 MG capsule, Take 1 capsule by mouth Daily., Disp: 30 capsule, Rfl: 3  •  rosuvastatin (CRESTOR) 40 MG tablet, Take 1 tablet by mouth Daily., Disp: 30 tablet, Rfl: 5  •  traZODone (DESYREL) 50 MG tablet, TAKE 1 TABLET BY MOUTH EVERY NIGHT AT BEDTIME (Patient taking differently: TAKE 1 TABLET BY MOUTH NIGHTLY AT BEDTIME AS NEEDED), Disp: 30 tablet, Rfl: 0  •  Cholecalciferol (HM VITAMIN D3) 2000 units capsule, Take 2,000 Units by mouth Daily., Disp: 30 each, Rfl:   •  ezetimibe (ZETIA) 10 MG tablet, Take 1 tablet by mouth Daily., Disp: 30 tablet, Rfl: 11  •  psyllium (METAMUCIL) 58.6 % powder, Take  by mouth Daily., Disp: , Rfl: 12  •  sucralfate (CARAFATE) 1 GM/10ML suspension, Take 10 mL by mouth 4 (Four) Times a Day., Disp: 420 mL, Rfl: 1    Current Facility-Administered Medications:   •  nitroglycerin (NITROSTAT) SL tablet 0.4 mg, 0.4 mg, Sublingual, Q5 Min PRN, Jamar Clark MD  •  sodium chloride 0.9 % flush 10 mL, 10 mL, Intravenous, PRN, Jamar Clark MD      Objective:     Vitals:    05/29/19 0900 05/29/19 0901   BP: 126/68 128/72   BP Location: Right arm Left arm   Pulse: 80    SpO2: 98%    Weight: 70.9 kg (156 lb 6.4 oz)    Height: 162.6 cm (64\")      Body mass index is 26.85 kg/m².    PHYSICAL EXAM:    Physical Exam   Constitutional: She is oriented to person, place, and time. She appears well-developed and well-nourished. No distress.   HENT:   Head: Normocephalic and atraumatic.   Eyes: Pupils are equal, round, and reactive to light.   Neck: No JVD present. No thyromegaly present.   Cardiovascular: Normal rate, regular rhythm, normal heart sounds and intact distal pulses.   No murmur heard.  Pulmonary/Chest: Effort normal and breath sounds normal. No respiratory distress.   Abdominal: Soft. Bowel sounds are normal. She exhibits no distension. There is no splenomegaly or hepatomegaly. There is " no tenderness.   Musculoskeletal: Normal range of motion. She exhibits no edema.   Neurological: She is alert and oriented to person, place, and time.   Skin: Skin is warm and dry. She is not diaphoretic. No erythema.   Midsternal scar   Psychiatric: She has a normal mood and affect. Her behavior is normal. Judgment normal.         ECG 12 Lead  Date/Time: 5/29/2019 9:16 AM  Performed by: Mariya Chappell APRN  Authorized by: Mariya Chappell APRN   Comparison: compared with previous ECG from 11/28/2018  Similar to previous ECG  Rhythm: sinus rhythm  Rate: normal  BPM: 73    Clinical impression: normal ECG  Comments: Indication: CAD              Assessment:       Diagnosis Plan   1. Benign essential hypertension     2. Mixed hyperlipidemia     3. Coronary artery disease involving native coronary artery of native heart without angina pectoris  ECG 12 Lead     Orders Placed This Encounter   Procedures   • ECG 12 Lead     This order was created via procedure documentation          Plan:       1. Coronary Artery Disease  Assessment  • The patient has no angina    Plan  • Lifestyle modifications discussed include adhering to a heart healthy diet, regular exercise and regular monitoring of cholesterol and blood pressure    Subjective - Objective  • There is a history of previous coronary artery bypass graft  • There has been a previous stent procedure using MIKHAIL  • Current antiplatelet therapy includes aspirin 81 mg      2.  Hyperlipidemia.  Her cholesterol is monitored by her PCP.  Her last LDL in February 2019 was 143.  She is on the maximum dose of Crestor at 40 mg daily.  I am going to add Zetia 10 mg daily to her regimen and check a lipid panel and LFTs in 3 months.  She may benefit from Repatha in the future.      3.  Hypertension.  Her blood pressure today looks excellent.  Continue current regimen.      Overall I think she is stable from a cardiac standpoint.  She denies any symptoms of angina or heart  failure.  I am going to have her follow-up with Dr. Contreras in 6 months or sooner if needed.      As always, it has been a pleasure to participate in your patient's care.      Sincerely,         BILL Storey

## 2019-07-30 DIAGNOSIS — I10 BENIGN ESSENTIAL HYPERTENSION: ICD-10-CM

## 2019-07-30 RX ORDER — METOPROLOL SUCCINATE 50 MG/1
TABLET, EXTENDED RELEASE ORAL
Qty: 90 TABLET | Refills: 0 | Status: SHIPPED | OUTPATIENT
Start: 2019-07-30 | End: 2019-10-23 | Stop reason: SDUPTHER

## 2019-08-29 ENCOUNTER — TELEPHONE (OUTPATIENT)
Dept: CARDIOLOGY | Facility: CLINIC | Age: 64
End: 2019-08-29

## 2019-08-29 DIAGNOSIS — E78.2 MIXED HYPERLIPIDEMIA: Primary | ICD-10-CM

## 2019-08-29 NOTE — TELEPHONE ENCOUNTER
Please call and remind her that she needs to come in for lab work since starting the Zetia to reassess her cholesterol. The lab order is in.

## 2019-08-29 NOTE — TELEPHONE ENCOUNTER
Called and L/M re: pt getting labs done. Requested pt call office. Will try to call again by end of day today.    HOWARD Cloud

## 2019-08-30 NOTE — TELEPHONE ENCOUNTER
Tried calling pt a second time w/ no answer. L/M Re: labs and requested that she call the office for any further info.    HOWARD Cloud

## 2019-09-10 ENCOUNTER — OFFICE VISIT (OUTPATIENT)
Dept: INTERNAL MEDICINE | Facility: CLINIC | Age: 64
End: 2019-09-10

## 2019-09-10 VITALS
WEIGHT: 153 LBS | DIASTOLIC BLOOD PRESSURE: 90 MMHG | TEMPERATURE: 98.4 F | HEART RATE: 77 BPM | OXYGEN SATURATION: 99 % | SYSTOLIC BLOOD PRESSURE: 130 MMHG | HEIGHT: 64 IN | BODY MASS INDEX: 26.12 KG/M2

## 2019-09-10 DIAGNOSIS — I10 BENIGN ESSENTIAL HYPERTENSION: Primary | ICD-10-CM

## 2019-09-10 DIAGNOSIS — M54.2 NECK PAIN: ICD-10-CM

## 2019-09-10 DIAGNOSIS — I70.90 ARTERIOSCLEROTIC VASCULAR DISEASE: ICD-10-CM

## 2019-09-10 LAB
HCT VFR BLDA CALC: 43.3 % (ref 38–51)
HGB BLDA-MCNC: 14.5 G/DL (ref 12–17)
LYMPHOCYTES # BLD: 49.3 %
MCH, POC: 30.2
MCHC, POC: 33.4
MCV, POC: 90.5
MONOCYTES # BLD: 5.6 %
PLATELET # BLD: 304 10*3/MM3
PMV BLD: 8 FL
POC NEUTROPHIL: 45.1 %
RBC, POC: 4.78
RDW, POC: 12.6
WBC # BLD: 4.4 10*3/UL

## 2019-09-10 PROCEDURE — 99214 OFFICE O/P EST MOD 30 MIN: CPT | Performed by: INTERNAL MEDICINE

## 2019-09-10 PROCEDURE — 71046 X-RAY EXAM CHEST 2 VIEWS: CPT | Performed by: INTERNAL MEDICINE

## 2019-09-10 PROCEDURE — 85025 COMPLETE CBC W/AUTO DIFF WBC: CPT | Performed by: INTERNAL MEDICINE

## 2019-09-10 RX ORDER — LOSARTAN POTASSIUM 50 MG/1
50 TABLET ORAL DAILY
Qty: 30 TABLET | Refills: 5 | Status: SHIPPED | OUTPATIENT
Start: 2019-09-10 | End: 2020-03-16 | Stop reason: SDUPTHER

## 2019-09-10 NOTE — PROGRESS NOTES
"Hypertension (no chest pain, no soa, 160/120,193/75,194/91,171/88,190/96,178/81)      HPI  Bernice Dunlap is a 64 y.o. female RTC in acute care:  'Well, I thought I was OK\".  Notes on Friday (4 days ago) had a pain across back of shoulders.  Felt like it kept going.  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  Got numbers above, but initial was 160/120.  Checked throught weekend and numbers were still elevated.  Pain in back between shoulder blades lasted for a few days.  'it was bad at first and then kind of eased up. The pain was not really concerning me, the blood pressure was\".  Pain came on so fast that Initially was unbearable and lingered. There was positional component even initially.  There was constant element to pain, but leaning certain way would make it worse. No SOA. No worse pain with deep breath.  Not describes as tearing pain.  No HA or dizziness issues. Been taking metoprolol regularly.   Decided yesterday after pressure still high, that would come in.   Recalls stress test negative in 2018.   Just joined gym and going a few times weekly.   \"I watch that\" when asked about salt intake.     Recalls had cough with ACE-I in past. No hx of andioedema.    Review of Systems   Constitution: Negative for chills and fever.   Cardiovascular: Negative for chest pain, dyspnea on exertion, leg swelling, near-syncope and syncope.   Respiratory: Negative for shortness of breath.    Musculoskeletal: Positive for neck pain (lower neck, between shoulder blades). Negative for joint pain (in shoulders).   Neurological: Negative for dizziness, headaches and light-headedness.       The following portions of the patient's history were reviewed and updated as appropriate: allergies, current medications, past medical history, past social history and problem list.      Current Outpatient Medications:   •  ASPIRIN PO, Take 81 mg by mouth Daily., Disp: , Rfl:   •  Cholecalciferol (HM VITAMIN D3) 2000 units " "capsule, Take 2,000 Units by mouth Daily., Disp: 30 each, Rfl:   •  ezetimibe (ZETIA) 10 MG tablet, Take 1 tablet by mouth Daily., Disp: 30 tablet, Rfl: 11  •  metoprolol succinate XL (TOPROL-XL) 50 MG 24 hr tablet, TAKE 1 TABLET BY MOUTH DAILY, Disp: 90 tablet, Rfl: 0  •  omeprazole (priLOSEC) 40 MG capsule, Take 1 capsule by mouth Daily., Disp: 30 capsule, Rfl: 3  •  psyllium (METAMUCIL) 58.6 % powder, Take  by mouth Daily., Disp: , Rfl: 12  •  rosuvastatin (CRESTOR) 40 MG tablet, Take 1 tablet by mouth Daily., Disp: 30 tablet, Rfl: 5  •  sucralfate (CARAFATE) 1 GM/10ML suspension, Take 10 mL by mouth 4 (Four) Times a Day., Disp: 420 mL, Rfl: 1  •  traZODone (DESYREL) 50 MG tablet, TAKE 1 TABLET BY MOUTH EVERY NIGHT AT BEDTIME (Patient taking differently: TAKE 1 TABLET BY MOUTH NIGHTLY AT BEDTIME AS NEEDED), Disp: 30 tablet, Rfl: 0  •  losartan (COZAAR) 50 MG tablet, Take 1 tablet by mouth Daily., Disp: 30 tablet, Rfl: 5    Current Facility-Administered Medications:   •  nitroglycerin (NITROSTAT) SL tablet 0.4 mg, 0.4 mg, Sublingual, Q5 Min PRN, Jamar Clark MD  •  sodium chloride 0.9 % flush 10 mL, 10 mL, Intravenous, PRN, Jamar Clark MD    Vitals:    09/10/19 0837 09/10/19 0849   BP: 148/100 130/90   Pulse: 77    Temp: 98.4 °F (36.9 °C)    SpO2: 99%    Weight: 69.4 kg (153 lb)    Height: 162.6 cm (64\")          Physical Exam   Constitutional: She is oriented to person, place, and time. She appears well-developed and well-nourished. No distress.   HENT:   Head: Normocephalic and atraumatic.   Eyes: Conjunctivae are normal. Pupils are equal, round, and reactive to light. Right eye exhibits no discharge. Left eye exhibits no discharge. No scleral icterus.   Neck: Normal range of motion. Neck supple. Carotid bruit is not present.   Cardiovascular: Normal rate, regular rhythm and normal heart sounds.   Pulses:       Carotid pulses are 2+ on the right side, and 2+ on the left side.       Radial pulses are " 2+ on the right side, and 2+ on the left side.        Posterior tibial pulses are 2+ on the right side, and 2+ on the left side.   Pulmonary/Chest: Effort normal and breath sounds normal. No respiratory distress. She has no wheezes. She has no rales.   Musculoskeletal: She exhibits no edema.        Right shoulder: She exhibits normal range of motion, no tenderness, no bony tenderness and no pain.        Left shoulder: She exhibits normal range of motion, no tenderness, no bony tenderness and no pain.        Cervical back: She exhibits normal range of motion, no tenderness, no bony tenderness, no deformity (slight kyphosis noted), no pain and no spasm.   Lymphadenopathy:     She has no cervical adenopathy.   Neurological: She is alert and oriented to person, place, and time. No cranial nerve deficit. She exhibits normal muscle tone. Gait normal.   Psychiatric: She has a normal mood and affect. Her behavior is normal. Thought content normal.   Vitals reviewed.    XR chest: Pain in upper back, HTN; No prior for comparison  Lungs clear, no mass  No effusions  NO cardiomegaly  No widened mediastinum or aortic knob      Assessment/ Plan  Diagnoses and all orders for this visit:    Benign essential hypertension  -     XR Chest PA & Lateral  -     Comprehensive Metabolic Panel  -     TSH  -     UA / M With / Rflx Culture([facility] ONLY) - Urine, Clean Catch  -     POC CBC With / Auto Diff    Arteriosclerotic vascular disease  -     XR Chest PA & Lateral  -     Comprehensive Metabolic Panel  -     TSH  -     UA / M With / Rflx Culture([facility] ONLY) - Urine, Clean Catch  -     POC CBC With / Auto Diff    Neck pain  -     XR Chest PA & Lateral  -     Comprehensive Metabolic Panel  -     TSH  -     UA / M With / Rflx Culture([facility] ONLY) - Urine, Clean Catch  -     POC CBC With / Auto Diff    Other orders  -     losartan (COZAAR) 50 MG tablet; Take 1 tablet by mouth Daily.        Return in about 4 weeks (around 10/8/2019) for  Recheck.      Discussion:  Bernice Dunlap is a 64 y.o. female RTC in acute care (new issue to examiner) with recent onset of pain between shoulder blades, now resolved, and concurrent increase in blood pressure on automated machine at work.  Pain in chest is really not convincing for aortic dissection but CXR completed today and was reassuring. Pain now resolved.  Blood pressure check here is not at goal, even after rest, but not nearly as high as at pharmacy. I question accuracy of machine pt is using.  Regardless, will need to augment blood pressure regiment today and will add losartan 50mg daily and recheck pressure and BMP in one month.  CBC OK today and will look at additional labs today to look for any secondary causes.   Pt to c/w work at gym (just joined) and salt restriction as she is.      RTC 4 weeks.

## 2019-09-11 LAB
ALBUMIN SERPL-MCNC: 4.8 G/DL (ref 3.5–5.2)
ALBUMIN/GLOB SERPL: 1.9 G/DL
ALP SERPL-CCNC: 72 U/L (ref 39–117)
ALT SERPL-CCNC: 12 U/L (ref 1–33)
APPEARANCE UR: CLEAR
AST SERPL-CCNC: 18 U/L (ref 1–32)
BACTERIA #/AREA URNS HPF: NORMAL /HPF
BILIRUB SERPL-MCNC: 0.6 MG/DL (ref 0.2–1.2)
BILIRUB UR QL STRIP: NEGATIVE
BUN SERPL-MCNC: 9 MG/DL (ref 8–23)
BUN/CREAT SERPL: 13 (ref 7–25)
CALCIUM SERPL-MCNC: 9.8 MG/DL (ref 8.6–10.5)
CHLORIDE SERPL-SCNC: 99 MMOL/L (ref 98–107)
CO2 SERPL-SCNC: 24.9 MMOL/L (ref 22–29)
COLOR UR: YELLOW
CREAT SERPL-MCNC: 0.69 MG/DL (ref 0.57–1)
EPI CELLS #/AREA URNS HPF: NORMAL /HPF
GLOBULIN SER CALC-MCNC: 2.5 GM/DL
GLUCOSE SERPL-MCNC: 87 MG/DL (ref 65–99)
GLUCOSE UR QL: NEGATIVE
HGB UR QL STRIP: NEGATIVE
KETONES UR QL STRIP: NEGATIVE
LEUKOCYTE ESTERASE UR QL STRIP: NEGATIVE
MICRO URNS: NORMAL
MICRO URNS: NORMAL
MUCOUS THREADS URNS QL MICRO: PRESENT /HPF
NITRITE UR QL STRIP: NEGATIVE
PH UR STRIP: 5.5 [PH] (ref 5–7.5)
POTASSIUM SERPL-SCNC: 4.6 MMOL/L (ref 3.5–5.2)
PROT SERPL-MCNC: 7.3 G/DL (ref 6–8.5)
PROT UR QL STRIP: NEGATIVE
RBC #/AREA URNS HPF: NORMAL /HPF
SODIUM SERPL-SCNC: 138 MMOL/L (ref 136–145)
SP GR UR: 1.01 (ref 1–1.03)
TSH SERPL DL<=0.005 MIU/L-ACNC: 0.62 UIU/ML (ref 0.27–4.2)
URINALYSIS REFLEX: NORMAL
UROBILINOGEN UR STRIP-MCNC: 0.2 MG/DL (ref 0.2–1)
WBC #/AREA URNS HPF: NORMAL /HPF

## 2019-10-03 DIAGNOSIS — I10 BENIGN ESSENTIAL HYPERTENSION: Primary | ICD-10-CM

## 2019-10-21 ENCOUNTER — OFFICE VISIT (OUTPATIENT)
Dept: INTERNAL MEDICINE | Facility: CLINIC | Age: 64
End: 2019-10-21

## 2019-10-21 VITALS
BODY MASS INDEX: 26.63 KG/M2 | SYSTOLIC BLOOD PRESSURE: 130 MMHG | OXYGEN SATURATION: 98 % | HEART RATE: 86 BPM | WEIGHT: 156 LBS | TEMPERATURE: 98.4 F | DIASTOLIC BLOOD PRESSURE: 60 MMHG | HEIGHT: 64 IN

## 2019-10-21 DIAGNOSIS — R73.03 PRE-DIABETES: ICD-10-CM

## 2019-10-21 DIAGNOSIS — I70.90 ARTERIOSCLEROTIC VASCULAR DISEASE: ICD-10-CM

## 2019-10-21 DIAGNOSIS — Z95.1 S/P CABG X 1: ICD-10-CM

## 2019-10-21 DIAGNOSIS — I10 ESSENTIAL HYPERTENSION: Primary | ICD-10-CM

## 2019-10-21 PROCEDURE — 99213 OFFICE O/P EST LOW 20 MIN: CPT | Performed by: INTERNAL MEDICINE

## 2019-10-21 RX ORDER — AMLODIPINE BESYLATE 5 MG/1
5 TABLET ORAL DAILY
COMMUNITY
Start: 2019-10-17 | End: 2021-03-29 | Stop reason: SDUPTHER

## 2019-10-21 NOTE — PROGRESS NOTES
"Hypertension      HPI  Bernice Dunlap is a 64 y.o. female RTC in f/u after recent inpt admission at [place] for C/P and SOA.  \"Stress test was positive. She was taken for cardiac catheter which showed mid RCA lesion and a stent was placed. Currently patient has been cleared by cardiology for discharge on dual antiplatelet therapy. Patient has been referred to cardiac rehabilitation\". Notes that chest discomfort had progressive since left here last time. Feels like has been feeling bad and having pressure.  However, got so bad that stopped in the \"the first hospital I saw\".  Kept over night and had stent placed in RCA. Was surprised that needed stent. \"I was very surprised.   Added \"Norvasc\" to regimen when left inpt. No home checks since been home.  Has a cuff at home but 'maybe I am scared to\". Has been on new med for about 6 days now.   Recalls that really stopped all tobacco for last 5 months or 6 months.  Has not been in gym as much but is planning to do cardiac rehab.  Feels like \"my next shelton is going to be food\".  Feels like really struggled when went to grocery store. Notes \"greene is my weakness\".   Thinks has a program through [employer] is available, not sure about cardiac rehab.     Review of Systems   Constitution: Positive for malaise/fatigue. Negative for chills and fever.   Cardiovascular: Negative for chest pain (resolved), dyspnea on exertion, near-syncope and syncope.   Respiratory: Negative for shortness of breath.    Neurological: Negative for dizziness and light-headedness.       The following portions of the patient's history were reviewed and updated as appropriate: allergies, current medications, past medical history, past social history and problem list.      Current Outpatient Medications:   •  amLODIPine (NORVASC) 5 MG tablet, , Disp: , Rfl:   •  ASPIRIN PO, Take 81 mg by mouth Daily., Disp: , Rfl:   •  Cholecalciferol (HM VITAMIN D3) 2000 units capsule, Take 2,000 Units by mouth Daily., " "Disp: 30 each, Rfl:   •  ezetimibe (ZETIA) 10 MG tablet, Take 1 tablet by mouth Daily., Disp: 30 tablet, Rfl: 11  •  losartan (COZAAR) 50 MG tablet, Take 1 tablet by mouth Daily., Disp: 30 tablet, Rfl: 5  •  metoprolol succinate XL (TOPROL-XL) 50 MG 24 hr tablet, TAKE 1 TABLET BY MOUTH DAILY, Disp: 90 tablet, Rfl: 0  •  omeprazole (priLOSEC) 40 MG capsule, Take 1 capsule by mouth Daily., Disp: 30 capsule, Rfl: 3  •  psyllium (METAMUCIL) 58.6 % powder, Take  by mouth Daily., Disp: , Rfl: 12  •  rosuvastatin (CRESTOR) 40 MG tablet, Take 1 tablet by mouth Daily., Disp: 30 tablet, Rfl: 5  •  sucralfate (CARAFATE) 1 GM/10ML suspension, Take 10 mL by mouth 4 (Four) Times a Day., Disp: 420 mL, Rfl: 1  •  ticagrelor (BRILINTA) 90 MG tablet tablet, Take 90 mg by mouth., Disp: , Rfl:   •  traZODone (DESYREL) 50 MG tablet, TAKE 1 TABLET BY MOUTH EVERY NIGHT AT BEDTIME (Patient taking differently: TAKE 1 TABLET BY MOUTH NIGHTLY AT BEDTIME AS NEEDED), Disp: 30 tablet, Rfl: 0    Current Facility-Administered Medications:   •  nitroglycerin (NITROSTAT) SL tablet 0.4 mg, 0.4 mg, Sublingual, Q5 Min PRN, Jamar Clark MD  •  sodium chloride 0.9 % flush 10 mL, 10 mL, Intravenous, PRN, Jamar Clark MD    Vitals:    10/21/19 1431   BP: 130/60   Pulse: 86   Temp: 98.4 °F (36.9 °C)   SpO2: 98%   Weight: 70.8 kg (156 lb)   Height: 162.6 cm (64\")         Physical Exam   Constitutional: She is oriented to person, place, and time. She appears well-developed and well-nourished. No distress.   HENT:   Head: Normocephalic and atraumatic.   Eyes: Pupils are equal, round, and reactive to light.   Cardiovascular: Normal rate and regular rhythm.   Pulmonary/Chest: Effort normal. No respiratory distress.   Neurological: She is alert and oriented to person, place, and time. No cranial nerve deficit. Gait normal.   Psychiatric: She has a normal mood and affect. Thought content normal.   Vitals reviewed.      Assessment/ Plan  Diagnoses and " "all orders for this visit:    Essential hypertension    Arteriosclerotic vascular disease  -     Ambulatory Referral to Nutrition Services    Pre-diabetes  -     Ambulatory Referral to Nutrition Services    S/P CABG x 1    Other orders  -     amLODIPine (NORVASC) 5 MG tablet  -     ticagrelor (BRILINTA) 90 MG tablet tablet; Take 90 mg by mouth.        Return in about 4 months (around 2/21/2020) for Annual physical.      Discussion:  Bernice Dunlap is a 64 y.o. female RTC in f/u after recent inpt admission at [facility] for C/P and SOA 10/2019. Per D/C Summary, \"Stress test was positive. She was taken for cardiac catheter which showed mid RCA lesion and a stent was placed. Currently patient has been cleared by cardiology for discharge on dual antiplatelet therapy. Patient has been referred to cardiac rehabilitation\". Pt is doing well at this time, tolerating Brilinta and new amlodipine well.  Pressure is controlled in office and D/C BMP noted to be OK.  I had long discussion with pt today as she is frustrated that this happened and is having trouble understanding why this happened. I reviewed with pt that she has a long hx of vascular disease and, despite that, has continued to have modifiable risk factors that she has not exactly modified (continued to smoke, was not committed to gym with pre-DM numbers). In addition, more recent pressure elevation and med titration noted in our office. Pt is committed today and clearly defines this as turning point event. She is committed to tobacco cessation for long term. Has plans for cardiac rehab and will exercise long term.  Needs dietician help to modify diet as pt has a lack of clarity on what she can eat, referral placed.  Pressure controlled in office and pt will c/w same meds.  Will return here in 4 months for CPE and will trend labs, including A1C, as well as progress in cardiac rehab.         >10/15 minutes was spent in counseling of the following topics:, test results, " impressions, risks/benefits of treatment options

## 2019-10-23 DIAGNOSIS — I10 BENIGN ESSENTIAL HYPERTENSION: ICD-10-CM

## 2019-10-24 RX ORDER — METOPROLOL SUCCINATE 50 MG/1
TABLET, EXTENDED RELEASE ORAL
Qty: 90 TABLET | Refills: 0 | Status: SHIPPED | OUTPATIENT
Start: 2019-10-24 | End: 2020-01-20

## 2019-10-25 DIAGNOSIS — G47.09 OTHER INSOMNIA: ICD-10-CM

## 2019-10-25 RX ORDER — TRAZODONE HYDROCHLORIDE 50 MG/1
TABLET ORAL
Qty: 30 TABLET | Refills: 0 | Status: SHIPPED | OUTPATIENT
Start: 2019-10-25 | End: 2019-11-21 | Stop reason: SDUPTHER

## 2019-10-26 DIAGNOSIS — K21.9 GASTRIC REFLUX: ICD-10-CM

## 2019-10-28 RX ORDER — OMEPRAZOLE 20 MG/1
CAPSULE, DELAYED RELEASE ORAL
Qty: 30 CAPSULE | Refills: 0 | Status: SHIPPED | OUTPATIENT
Start: 2019-10-28 | End: 2020-01-03

## 2019-11-06 DIAGNOSIS — I70.90 ARTERIOSCLEROTIC VASCULAR DISEASE: ICD-10-CM

## 2019-11-06 DIAGNOSIS — E78.2 MIXED HYPERLIPIDEMIA: ICD-10-CM

## 2019-11-06 RX ORDER — ROSUVASTATIN CALCIUM 40 MG/1
40 TABLET, COATED ORAL DAILY
Qty: 30 TABLET | Refills: 5 | Status: SHIPPED | OUTPATIENT
Start: 2019-11-06 | End: 2020-06-05

## 2019-11-12 ENCOUNTER — HOSPITAL ENCOUNTER (OUTPATIENT)
Dept: DIABETES SERVICES | Facility: HOSPITAL | Age: 64
Discharge: HOME | End: 2019-11-12
Admitting: INTERNAL MEDICINE

## 2019-11-12 PROCEDURE — 97802 MEDICAL NUTRITION INDIV IN: CPT | Performed by: DIETITIAN, REGISTERED

## 2019-11-21 DIAGNOSIS — G47.09 OTHER INSOMNIA: ICD-10-CM

## 2019-11-21 RX ORDER — TRAZODONE HYDROCHLORIDE 50 MG/1
TABLET ORAL
Qty: 30 TABLET | Refills: 0 | Status: SHIPPED | OUTPATIENT
Start: 2019-11-21 | End: 2019-12-20

## 2019-12-20 DIAGNOSIS — G47.09 OTHER INSOMNIA: ICD-10-CM

## 2019-12-20 RX ORDER — TRAZODONE HYDROCHLORIDE 50 MG/1
TABLET ORAL
Qty: 30 TABLET | Refills: 0 | Status: SHIPPED | OUTPATIENT
Start: 2019-12-20 | End: 2020-01-24

## 2020-01-03 ENCOUNTER — OFFICE VISIT (OUTPATIENT)
Dept: CARDIOLOGY | Facility: CLINIC | Age: 65
End: 2020-01-03

## 2020-01-03 VITALS
WEIGHT: 159 LBS | DIASTOLIC BLOOD PRESSURE: 72 MMHG | BODY MASS INDEX: 28.17 KG/M2 | HEART RATE: 75 BPM | OXYGEN SATURATION: 99 % | HEIGHT: 63 IN | SYSTOLIC BLOOD PRESSURE: 130 MMHG

## 2020-01-03 DIAGNOSIS — I25.10 CORONARY ARTERY DISEASE INVOLVING NATIVE CORONARY ARTERY OF NATIVE HEART WITHOUT ANGINA PECTORIS: Primary | ICD-10-CM

## 2020-01-03 DIAGNOSIS — E78.2 MIXED HYPERLIPIDEMIA: ICD-10-CM

## 2020-01-03 DIAGNOSIS — I10 ESSENTIAL HYPERTENSION: ICD-10-CM

## 2020-01-03 DIAGNOSIS — Z95.1 S/P CABG X 1: ICD-10-CM

## 2020-01-03 PROCEDURE — 93000 ELECTROCARDIOGRAM COMPLETE: CPT | Performed by: INTERNAL MEDICINE

## 2020-01-03 PROCEDURE — 99214 OFFICE O/P EST MOD 30 MIN: CPT | Performed by: INTERNAL MEDICINE

## 2020-01-03 NOTE — PROGRESS NOTES
Date of Office Visit: 20  Encounter Provider: Rashad Contreras MD  Place of Service: [facility] CARDIOLOGY  Patient Name: Bernice Dunlap  :1955  [id number]    Chief Complaint   Patient presents with   • Hypertension     6 mos follow up   :     HPI: Bernice Dunlap is a 64 y.o. female she is here for follow-up she had a myocardial infarction in  and then ultimately had a one-vessel CABG in  she last had a stress test in 2018 and that was normal.  So she had unstable angina and had a 3 oh 15 resolute drug-eluting stent implanted to her mid circumflex in 2019 at [place].  She has done very well since then she had a little bit of mild 50% disease in a small ZOIE but otherwise her coronaries were good and her LV function was normal.  She works as a  at [employer].  She is not having any symptoms of angina shortness of breath PND orthopnea edema    Past Medical History:   Diagnosis Date   • Arteriosclerosis of carotid artery     1-15% B 2013 U/S --> 16-49% bilaterally in    • Arteriosclerotic cardiovascular disease (ASCVD)     MI ; s/p CABG x 1    • Benign essential hypertension    • BPPV (benign paroxysmal positional vertigo)    • Fibromyalgia    • History of nephrolithiasis     on L; non-obstructing on  CT; reidentified on X-ray in    • Hydrothorax 3/11/2016   • Hyperlipidemia    • Migraine headache    • Myocardial infarction (CMS/HCC)     Onset Date:    • Pleural effusion 3/11/2016   • Pre-diabetes    • Stress and adjustment reaction 3/11/2016   • Vitamin D deficiency        Past Surgical History:   Procedure Laterality Date   • APPENDECTOMY     • CHOLECYSTECTOMY      Laparoscopic   • CORONARY ARTERY BYPASS GRAFT      1 vessel bypass. Mammary artery to the LAD; ; nl stress in    • HYSTERECTOMY     • SALPINGO OOPHORECTOMY Bilateral    • THORACOSCOPY      With Pleurodesis (Therapeutic) -  after traumatic  effusion; Dr. Tomas       Social History     Socioeconomic History   • Marital status:      Spouse name: Not on file   • Number of children: 1   • Years of education: Not on file   • Highest education level: Not on file   Occupational History   • Occupation: Assistant     Comment: Retired "[employer]   • Occupation:      Comment: [employer]   Tobacco Use   • Smoking status: Former Smoker     Last attempt to quit: 2015     Years since quittin.0   • Smokeless tobacco: Never Used   • Tobacco comment: 6 pk/yr hx; occasionally smokes; quit 2019   Substance and Sexual Activity   • Alcohol use: No     Comment: caffeine use minimal   • Drug use: No   • Sexual activity: Defer     Partners: Male   Lifestyle   • Physical activity:     Days per week: 2 days     Minutes per session: 50 min   • Stress: Not on file   Social History Narrative    Diet - overall healthy,eating fruits and veggies, does eat on the run too often    Exercise - walking daily in past; variable in 2018 with 2x/ week at gym    Caffeine - occasional tea       Family History   Problem Relation Age of Onset   • Heart disease Mother         Arteriosclerotic cardiovascular disease; dx'd in 50's   • Alcohol abuse Father         Alcoholism   • COPD Father    • Depression Sister    • Leukemia Sister    • Diabetes Sister         Type 2 Diabetes Mellitus   • Alcohol abuse Brother         Alcoholism   • Hypertension Brother         Benign Essential Hypertension   • Glaucoma Brother    • Hyperlipidemia Brother    • No Known Problems Son    • No Known Problems Maternal Grandmother    • No Known Problems Maternal Grandfather    • No Known Problems Paternal Grandmother    • No Known Problems Paternal Grandfather        Review of Systems   Constitution: Negative for decreased appetite, fever, malaise/fatigue and weight loss.   HENT: Negative for nosebleeds.    Eyes: Negative for double vision.   Cardiovascular: Negative for chest pain,  claudication, cyanosis, dyspnea on exertion, irregular heartbeat, leg swelling, near-syncope, orthopnea, palpitations, paroxysmal nocturnal dyspnea and syncope.   Respiratory: Negative for cough, hemoptysis and shortness of breath.    Hematologic/Lymphatic: Negative for bleeding problem.   Skin: Negative for rash.   Musculoskeletal: Negative for falls and myalgias.   Gastrointestinal: Negative for hematochezia, jaundice, melena, nausea and vomiting.   Genitourinary: Negative for hematuria.   Neurological: Negative for dizziness and seizures.   Psychiatric/Behavioral: Negative for altered mental status and memory loss.       Allergies   Allergen Reactions   • Imitrex [Sumatriptan] Other (See Comments)     MI   • Lisinopril Cough         Current Outpatient Medications:   •  amLODIPine (NORVASC) 5 MG tablet, Take 5 mg by mouth Daily., Disp: , Rfl:   •  ASPIRIN PO, Take 81 mg by mouth Daily., Disp: , Rfl:   •  Cholecalciferol (HM VITAMIN D3) 2000 units capsule, Take 2,000 Units by mouth Daily., Disp: 30 each, Rfl:   •  ezetimibe (ZETIA) 10 MG tablet, Take 1 tablet by mouth Daily., Disp: 30 tablet, Rfl: 11  •  losartan (COZAAR) 50 MG tablet, Take 1 tablet by mouth Daily., Disp: 30 tablet, Rfl: 5  •  metoprolol succinate XL (TOPROL-XL) 50 MG 24 hr tablet, TAKE 1 TABLET BY MOUTH DAILY, Disp: 90 tablet, Rfl: 0  •  omeprazole (priLOSEC) 40 MG capsule, Take 1 capsule by mouth Daily., Disp: 30 capsule, Rfl: 3  •  psyllium (METAMUCIL) 58.6 % powder, Take  by mouth Daily., Disp: , Rfl: 12  •  rosuvastatin (CRESTOR) 40 MG tablet, Take 1 tablet by mouth Daily., Disp: 30 tablet, Rfl: 5  •  sucralfate (CARAFATE) 1 GM/10ML suspension, Take 10 mL by mouth 4 (Four) Times a Day., Disp: 420 mL, Rfl: 1  •  ticagrelor (BRILINTA) 90 MG tablet tablet, Take 90 mg by mouth 2 (Two) Times a Day., Disp: , Rfl:   •  traZODone (DESYREL) 50 MG tablet, TAKE 1 TABLET BY MOUTH EVERY NIGHT AT BEDTIME, Disp: 30 tablet, Rfl: 0    Current  "Facility-Administered Medications:   •  nitroglycerin (NITROSTAT) SL tablet 0.4 mg, 0.4 mg, Sublingual, Q5 Min PRN, Jamar Clark MD  •  sodium chloride 0.9 % flush 10 mL, 10 mL, Intravenous, PRN, Jamar Clark MD      Objective:     Vitals:    01/03/20 1014   BP: 130/72   BP Location: Left arm   Patient Position: Sitting   Cuff Size: Adult   Pulse: 75   SpO2: 99%   Weight: 72.1 kg (159 lb)   Height: 160 cm (63\")     Body mass index is 28.17 kg/m².    Physical Exam   Constitutional: She is oriented to person, place, and time. She appears well-developed and well-nourished.   HENT:   Head: Normocephalic.   Eyes: No scleral icterus.   Neck: No JVD present. No thyromegaly present.   Cardiovascular: Normal rate, regular rhythm and normal heart sounds. Exam reveals no gallop and no friction rub.   No murmur heard.  Pulmonary/Chest: Effort normal and breath sounds normal. She has no wheezes. She has no rales.       Abdominal: Soft. There is no hepatosplenomegaly. There is no tenderness.   Musculoskeletal: Normal range of motion. She exhibits no edema.   Lymphadenopathy:     She has no cervical adenopathy.   Neurological: She is alert and oriented to person, place, and time.   Skin: Skin is warm and dry. No rash noted.   Psychiatric: She has a normal mood and affect.         ECG 12 Lead  Date/Time: 1/3/2020 10:51 AM  Performed by: Rashad Contreras MD  Authorized by: Rashad Contreras MD   Comparison: compared with previous ECG   Similar to previous ECG  Rhythm: sinus rhythm  Other findings: left ventricular hypertrophy    Clinical impression: abnormal EKG             Assessment:       Diagnosis Plan   1. Coronary artery disease involving native coronary artery of native heart without angina pectoris     2. Essential hypertension     3. Mixed hyperlipidemia     4. S/P CABG x 1            Plan:       She is doing very well we need to continue to concentrate on risk factor modification she is on good medical therapy.  " We are going to stop her aspirin today and just keep her on long-term ADP receptor blocker I will have her come see Mariya in October and at that point we will switch her off of Brilinta and onto clopidogrel long-term    As always, it has been a pleasure to participate in your patient's care.      Sincerely,       Rashad Contreras MD

## 2020-01-20 DIAGNOSIS — I10 BENIGN ESSENTIAL HYPERTENSION: ICD-10-CM

## 2020-01-20 RX ORDER — METOPROLOL SUCCINATE 50 MG/1
TABLET, EXTENDED RELEASE ORAL
Qty: 90 TABLET | Refills: 0 | Status: SHIPPED | OUTPATIENT
Start: 2020-01-20 | End: 2020-04-27

## 2020-01-24 DIAGNOSIS — G47.09 OTHER INSOMNIA: ICD-10-CM

## 2020-01-24 RX ORDER — TRAZODONE HYDROCHLORIDE 50 MG/1
TABLET ORAL
Qty: 30 TABLET | Refills: 0 | Status: SHIPPED | OUTPATIENT
Start: 2020-01-24 | End: 2020-02-26

## 2020-02-11 ENCOUNTER — OFFICE VISIT (OUTPATIENT)
Dept: INTERNAL MEDICINE | Facility: CLINIC | Age: 65
End: 2020-02-11

## 2020-02-11 ENCOUNTER — HOSPITAL ENCOUNTER (OUTPATIENT)
Dept: CT IMAGING | Facility: HOSPITAL | Age: 65
Discharge: HOME | End: 2020-02-11
Admitting: INTERNAL MEDICINE

## 2020-02-11 VITALS
OXYGEN SATURATION: 95 % | DIASTOLIC BLOOD PRESSURE: 70 MMHG | TEMPERATURE: 98.6 F | BODY MASS INDEX: 27.29 KG/M2 | HEIGHT: 63 IN | WEIGHT: 154 LBS | SYSTOLIC BLOOD PRESSURE: 136 MMHG | HEART RATE: 78 BPM

## 2020-02-11 DIAGNOSIS — M54.9 COSTOVERTEBRAL ANGLE PAIN: ICD-10-CM

## 2020-02-11 DIAGNOSIS — M54.50 ACUTE LEFT-SIDED LOW BACK PAIN WITHOUT SCIATICA: ICD-10-CM

## 2020-02-11 DIAGNOSIS — R31.29 OTHER MICROSCOPIC HEMATURIA: ICD-10-CM

## 2020-02-11 DIAGNOSIS — J20.9 ACUTE BRONCHITIS, UNSPECIFIED ORGANISM: ICD-10-CM

## 2020-02-11 DIAGNOSIS — J01.90 ACUTE SINUSITIS, RECURRENCE NOT SPECIFIED, UNSPECIFIED LOCATION: ICD-10-CM

## 2020-02-11 DIAGNOSIS — R35.0 URINE FREQUENCY: Primary | ICD-10-CM

## 2020-02-11 LAB
BILIRUB BLD-MCNC: NEGATIVE MG/DL
CLARITY, POC: CLEAR
COLOR UR: YELLOW
GLUCOSE UR STRIP-MCNC: NEGATIVE MG/DL
KETONES UR QL: NEGATIVE
LEUKOCYTE EST, POC: ABNORMAL
NITRITE UR-MCNC: NEGATIVE MG/ML
PH UR: 6 [PH] (ref 5–8)
PROT UR STRIP-MCNC: NEGATIVE MG/DL
RBC # UR STRIP: ABNORMAL /UL
SP GR UR: 1.03 (ref 1–1.03)
UROBILINOGEN UR QL: NORMAL

## 2020-02-11 PROCEDURE — 81003 URINALYSIS AUTO W/O SCOPE: CPT | Performed by: INTERNAL MEDICINE

## 2020-02-11 PROCEDURE — 99214 OFFICE O/P EST MOD 30 MIN: CPT | Performed by: INTERNAL MEDICINE

## 2020-02-11 PROCEDURE — 74176 CT ABD & PELVIS W/O CONTRAST: CPT

## 2020-02-11 RX ORDER — GUAIFENESIN AND DEXTROMETHORPHAN HYDROBROMIDE 1200; 60 MG/1; MG/1
TABLET, EXTENDED RELEASE ORAL
Qty: 28 EACH | Refills: 0
Start: 2020-02-11 | End: 2020-03-02

## 2020-02-11 RX ORDER — SOD CHLOR,BICARB/SQUEEZ BOTTLE
1 PACKET, WITH RINSE DEVICE NASAL 3 TIMES DAILY
Qty: 1 EACH | Refills: 0
Start: 2020-02-11 | End: 2020-03-02

## 2020-02-11 NOTE — PROGRESS NOTES
"Cough (runny nose) and Urinary Tract Infection (odor in urine,pain in left flank pain)      HPI  Bernice Dunlap is a 64 y.o. female RTC in acute care:   \"I think I have a bladder infection\".  Had a death in family, so has been a bit off routine.  Has had sx for last week. Sx are urine has odor and then has 'pain right through here' (points to L low back).  Feels like relieves \"some pressure\" when goes to urinate. No blood in urine.  NO fevers.   Has some nasal congestion sx for last 2 weeks.  Started with chest congestion, then some sinus congestion and post nasal gtt.  Worse sx at night.  Really congested but is better now. No fevers.  No sore throat now, had some one day initially.  HA has resolved.  Blowing out some mucous, clear mucous.   Meds: Mucinex and ASA.  Sick contacts: sister in law recently prior to onset of pt sx.      Review of Systems   Constitution: Positive for malaise/fatigue. Negative for chills and fever.   HENT: Positive for congestion and hoarse voice (much better today that was, had lost voice). Negative for ear discharge, ear pain and sore throat (resolved).    Cardiovascular: Negative for dyspnea on exertion.   Respiratory: Positive for cough and sputum production (from post nasal gtt). Negative for shortness of breath and wheezing.    Musculoskeletal: Positive for back pain (L low back with UTI sx). Negative for myalgias.   Gastrointestinal: Negative for diarrhea, nausea and vomiting.   Neurological: Positive for headaches (resolved).       The following portions of the patient's history were reviewed and updated as appropriate: allergies, current medications, past medical history, past social history and problem list.      Current Outpatient Medications:   •  amLODIPine (NORVASC) 5 MG tablet, Take 5 mg by mouth Daily., Disp: , Rfl:   •  Cholecalciferol (HM VITAMIN D3) 2000 units capsule, Take 2,000 Units by mouth Daily., Disp: 30 each, Rfl:   •  ezetimibe (ZETIA) 10 MG tablet, Take 1 " "tablet by mouth Daily., Disp: 30 tablet, Rfl: 11  •  losartan (COZAAR) 50 MG tablet, Take 1 tablet by mouth Daily., Disp: 30 tablet, Rfl: 5  •  metoprolol succinate XL (TOPROL-XL) 50 MG 24 hr tablet, TAKE 1 TABLET BY MOUTH DAILY, Disp: 90 tablet, Rfl: 0  •  omeprazole (priLOSEC) 40 MG capsule, Take 1 capsule by mouth Daily., Disp: 30 capsule, Rfl: 3  •  psyllium (METAMUCIL) 58.6 % powder, Take  by mouth Daily., Disp: , Rfl: 12  •  rosuvastatin (CRESTOR) 40 MG tablet, Take 1 tablet by mouth Daily., Disp: 30 tablet, Rfl: 5  •  sucralfate (CARAFATE) 1 GM/10ML suspension, Take 10 mL by mouth 4 (Four) Times a Day., Disp: 420 mL, Rfl: 1  •  ticagrelor (BRILINTA) 90 MG tablet tablet, Take 90 mg by mouth 2 (Two) Times a Day., Disp: , Rfl:   •  traZODone (DESYREL) 50 MG tablet, TAKE 1 TABLET BY MOUTH EVERY NIGHT AT BEDTIME, Disp: 30 tablet, Rfl: 0  •  Chlorphen-PE-Acetaminophen (CORICIDIN D COLD/FLU/SINUS) 2-5-325 MG tablet, Take 1 tablet by mouth 2 (Two) Times a Day., Disp: , Rfl:   •  Dextromethorphan-guaiFENesin (MUCINEX DM MAXIMUM STRENGTH)  MG tablet sustained-release 12 hour, One PO BID, Disp: 28 each, Rfl: 0  •  Hypertonic Nasal Wash (SINUS RINSE BOTTLE KIT) pack, 1 bottle into the nostril(s) as directed by provider 3 (Three) Times a Day., Disp: 1 each, Rfl: 0    Current Facility-Administered Medications:   •  nitroglycerin (NITROSTAT) SL tablet 0.4 mg, 0.4 mg, Sublingual, Q5 Min PRN, Jamar Clark MD  •  sodium chloride 0.9 % flush 10 mL, 10 mL, Intravenous, PRN, Jamar Clark MD    Vitals:    02/11/20 1346   BP: 136/70   Pulse: 78   Temp: 98.6 °F (37 °C)   SpO2: 95%   Weight: 69.9 kg (154 lb)   Height: 160 cm (63\")     Body mass index is 27.28 kg/m².      Physical Exam   Constitutional: She is oriented to person, place, and time. She appears well-developed and well-nourished. She does not have a sickly appearance. She does not appear ill. No distress.   HENT:   Head: Normocephalic and atraumatic. "   Right Ear: Tympanic membrane, external ear and ear canal normal.   Left Ear: Tympanic membrane, external ear and ear canal normal.   Nose: No mucosal edema or rhinorrhea. Right sinus exhibits no maxillary sinus tenderness and no frontal sinus tenderness. Left sinus exhibits no maxillary sinus tenderness and no frontal sinus tenderness.   Mouth/Throat: Uvula is midline and oropharynx is clear and moist. Mucous membranes are not pale, not dry and not cyanotic. No oral lesions. No oropharyngeal exudate, posterior oropharyngeal edema or posterior oropharyngeal erythema.   Eyes: Pupils are equal, round, and reactive to light. Conjunctivae are normal. Right eye exhibits no discharge. Left eye exhibits no discharge.   Neck: Normal range of motion. Neck supple.   Cardiovascular: Normal rate, regular rhythm and normal heart sounds.   Pulmonary/Chest: Effort normal and breath sounds normal. No respiratory distress. She has no wheezes. She has no rales.   Abdominal: Soft. Bowel sounds are normal. She exhibits no distension and no mass. There is no tenderness. There is CVA tenderness (mild, on L). There is no guarding.   Lymphadenopathy:     She has no cervical adenopathy.   Neurological: She is alert and oriented to person, place, and time. No cranial nerve deficit. Gait normal.   Skin: No rash (on low back) noted.   Psychiatric: She has a normal mood and affect. Her behavior is normal.   Vitals reviewed.    Results for orders placed or performed in visit on 02/11/20   POC Urinalysis Dipstick, Automated   Result Value Ref Range    Color Yellow Yellow, Straw, Dark Yellow, Shaista    Clarity, UA Clear Clear    Specific Gravity  1.030 1.005 - 1.030    pH, Urine 6.0 5.0 - 8.0    Leukocytes Trace (A) Negative    Nitrite, UA Negative Negative    Protein, POC Negative Negative mg/dL    Glucose, UA Negative Negative, 1000 mg/dL (3+) mg/dL    Ketones, UA Negative Negative    Urobilinogen, UA Normal Normal    Bilirubin Negative Negative     Blood, UA Moderate (A) Negative         Assessment/ Plan  Diagnoses and all orders for this visit:    Urine frequency  -     POC Urinalysis Dipstick, Automated  -     Urine Culture - Urine, Urine, Clean Catch    Costovertebral angle pain  -     POC Urinalysis Dipstick, Automated  -     Urine Culture - Urine, Urine, Clean Catch  -     CT Abdomen Pelvis Without Contrast; Future    Acute sinusitis, recurrence not specified, unspecified location  -     Chlorphen-PE-Acetaminophen (CORICIDIN D COLD/FLU/SINUS) 2-5-325 MG tablet; Take 1 tablet by mouth 2 (Two) Times a Day.  -     Hypertonic Nasal Wash (SINUS RINSE BOTTLE KIT) pack; 1 bottle into the nostril(s) as directed by provider 3 (Three) Times a Day.    Acute bronchitis, unspecified organism  -     Hypertonic Nasal Wash (SINUS RINSE BOTTLE KIT) pack; 1 bottle into the nostril(s) as directed by provider 3 (Three) Times a Day.  -     Dextromethorphan-guaiFENesin (MUCINEX DM MAXIMUM STRENGTH)  MG tablet sustained-release 12 hour; One PO BID    Acute left-sided low back pain without sciatica  -     CT Abdomen Pelvis Without Contrast; Future    Other microscopic hematuria  -     CT Abdomen Pelvis Without Contrast; Future    Other orders  -     Cancel: Urine Culture - Urine, Urine, Clean Catch        Return for Next scheduled follow up.      Discussion:  Bernice Dunlap is a 64 y.o. female RTC in acute care:   1. LL back pain and hematuria with known hx of retained kidney stones - pt thought due to UTI, but labs show hematuria. Recall pt hx of nephrolithiasis with retained stone in L kidney on 7/2018 CT. I suspect we are dealing with moving stone and will need ASAP non-contrasted CT A/P to eval.  Hold on Abx today.  Will f/u with pt after CT in next 24-48 hours.  2. Acute bronchitis and sinusitis - acute issue today, sx resolving.  I suspect viral etiology.  Exam benign. Reassured pt.  Conservative care regimen as noted above along with aggressive nasal toilet.  Pt  to call or RTC if T> 100.5, SOA, double sickness, or failure to improve.      RTC as planned.

## 2020-02-13 DIAGNOSIS — R31.9 HEMATURIA, UNSPECIFIED TYPE: Primary | ICD-10-CM

## 2020-02-13 LAB
BACTERIA UR CULT: NORMAL
BACTERIA UR CULT: NORMAL

## 2020-02-26 DIAGNOSIS — E78.2 MIXED HYPERLIPIDEMIA: ICD-10-CM

## 2020-02-26 DIAGNOSIS — M79.7 FIBROMYALGIA: ICD-10-CM

## 2020-02-26 DIAGNOSIS — E55.9 VITAMIN D DEFICIENCY: ICD-10-CM

## 2020-02-26 DIAGNOSIS — R73.03 PRE-DIABETES: ICD-10-CM

## 2020-02-26 DIAGNOSIS — I10 ESSENTIAL HYPERTENSION: ICD-10-CM

## 2020-02-26 DIAGNOSIS — G47.09 OTHER INSOMNIA: ICD-10-CM

## 2020-02-26 DIAGNOSIS — Z00.00 HEALTHCARE MAINTENANCE: Primary | ICD-10-CM

## 2020-02-26 RX ORDER — TRAZODONE HYDROCHLORIDE 50 MG/1
TABLET ORAL
Qty: 30 TABLET | Refills: 0 | Status: SHIPPED | OUTPATIENT
Start: 2020-02-26 | End: 2021-09-28 | Stop reason: SDUPTHER

## 2020-02-28 LAB
25(OH)D3+25(OH)D2 SERPL-MCNC: 22.3 NG/ML (ref 30–100)
ALBUMIN SERPL-MCNC: 4.2 G/DL (ref 3.5–5.2)
ALBUMIN/GLOB SERPL: 1.5 G/DL
ALP SERPL-CCNC: 66 U/L (ref 39–117)
ALT SERPL-CCNC: 11 U/L (ref 1–33)
APPEARANCE UR: CLEAR
AST SERPL-CCNC: 11 U/L (ref 1–32)
BACTERIA #/AREA URNS HPF: ABNORMAL /HPF
BACTERIA UR CULT: NORMAL
BACTERIA UR CULT: NORMAL
BASOPHILS # BLD AUTO: 0.03 10*3/MM3 (ref 0–0.2)
BASOPHILS NFR BLD AUTO: 0.7 % (ref 0–1.5)
BILIRUB SERPL-MCNC: 0.7 MG/DL (ref 0.2–1.2)
BILIRUB UR QL STRIP: NEGATIVE
BUN SERPL-MCNC: 12 MG/DL (ref 8–23)
BUN/CREAT SERPL: 15.4 (ref 7–25)
CALCIUM SERPL-MCNC: 9.5 MG/DL (ref 8.6–10.5)
CHLORIDE SERPL-SCNC: 100 MMOL/L (ref 98–107)
CHOLEST SERPL-MCNC: 143 MG/DL (ref 0–200)
CO2 SERPL-SCNC: 27.2 MMOL/L (ref 22–29)
COLOR UR: YELLOW
CREAT SERPL-MCNC: 0.78 MG/DL (ref 0.57–1)
EOSINOPHIL # BLD AUTO: 0.11 10*3/MM3 (ref 0–0.4)
EOSINOPHIL NFR BLD AUTO: 2.5 % (ref 0.3–6.2)
EPI CELLS #/AREA URNS HPF: ABNORMAL /HPF (ref 0–10)
ERYTHROCYTE [DISTWIDTH] IN BLOOD BY AUTOMATED COUNT: 11.9 % (ref 12.3–15.4)
GLOBULIN SER CALC-MCNC: 2.8 GM/DL
GLUCOSE SERPL-MCNC: 97 MG/DL (ref 65–99)
GLUCOSE UR QL: NEGATIVE
HBA1C MFR BLD: 5.6 % (ref 4.8–5.6)
HCT VFR BLD AUTO: 36.3 % (ref 34–46.6)
HDLC SERPL-MCNC: 44 MG/DL (ref 40–60)
HGB BLD-MCNC: 12.3 G/DL (ref 12–15.9)
HGB UR QL STRIP: NEGATIVE
IMM GRANULOCYTES # BLD AUTO: 0.01 10*3/MM3 (ref 0–0.05)
IMM GRANULOCYTES NFR BLD AUTO: 0.2 % (ref 0–0.5)
KETONES UR QL STRIP: NEGATIVE
LDLC SERPL CALC-MCNC: 81 MG/DL (ref 0–100)
LEUKOCYTE ESTERASE UR QL STRIP: ABNORMAL
LYMPHOCYTES # BLD AUTO: 1.77 10*3/MM3 (ref 0.7–3.1)
LYMPHOCYTES NFR BLD AUTO: 40 % (ref 19.6–45.3)
MCH RBC QN AUTO: 30.3 PG (ref 26.6–33)
MCHC RBC AUTO-ENTMCNC: 33.9 G/DL (ref 31.5–35.7)
MCV RBC AUTO: 89.4 FL (ref 79–97)
MICRO URNS: ABNORMAL
MONOCYTES # BLD AUTO: 0.27 10*3/MM3 (ref 0.1–0.9)
MONOCYTES NFR BLD AUTO: 6.1 % (ref 5–12)
MUCOUS THREADS URNS QL MICRO: PRESENT /HPF
NEUTROPHILS # BLD AUTO: 2.24 10*3/MM3 (ref 1.7–7)
NEUTROPHILS NFR BLD AUTO: 50.5 % (ref 42.7–76)
NITRITE UR QL STRIP: NEGATIVE
NRBC BLD AUTO-RTO: 0 /100 WBC (ref 0–0.2)
PH UR STRIP: 5.5 [PH] (ref 5–7.5)
PLATELET # BLD AUTO: 375 10*3/MM3 (ref 140–450)
POTASSIUM SERPL-SCNC: 4 MMOL/L (ref 3.5–5.2)
PROT SERPL-MCNC: 7 G/DL (ref 6–8.5)
PROT UR QL STRIP: NEGATIVE
RBC # BLD AUTO: 4.06 10*6/MM3 (ref 3.77–5.28)
RBC #/AREA URNS HPF: ABNORMAL /HPF (ref 0–2)
SODIUM SERPL-SCNC: 138 MMOL/L (ref 136–145)
SP GR UR: 1.02 (ref 1–1.03)
TRIGL SERPL-MCNC: 89 MG/DL (ref 0–150)
TSH SERPL DL<=0.005 MIU/L-ACNC: 0.86 UIU/ML (ref 0.27–4.2)
URINALYSIS REFLEX: ABNORMAL
UROBILINOGEN UR STRIP-MCNC: 0.2 MG/DL (ref 0.2–1)
VLDLC SERPL CALC-MCNC: 17.8 MG/DL
WBC # BLD AUTO: 4.43 10*3/MM3 (ref 3.4–10.8)
WBC #/AREA URNS HPF: ABNORMAL /HPF (ref 0–5)

## 2020-03-02 ENCOUNTER — OFFICE VISIT (OUTPATIENT)
Dept: INTERNAL MEDICINE | Facility: CLINIC | Age: 65
End: 2020-03-02

## 2020-03-02 VITALS
SYSTOLIC BLOOD PRESSURE: 120 MMHG | TEMPERATURE: 98.9 F | RESPIRATION RATE: 12 BRPM | OXYGEN SATURATION: 98 % | HEART RATE: 71 BPM | HEIGHT: 63 IN | DIASTOLIC BLOOD PRESSURE: 70 MMHG | WEIGHT: 157 LBS | BODY MASS INDEX: 27.82 KG/M2

## 2020-03-02 DIAGNOSIS — G43.009 MIGRAINE WITHOUT AURA AND WITHOUT STATUS MIGRAINOSUS, NOT INTRACTABLE: ICD-10-CM

## 2020-03-02 DIAGNOSIS — R73.03 PRE-DIABETES: ICD-10-CM

## 2020-03-02 DIAGNOSIS — N20.0 NEPHROLITHIASIS: ICD-10-CM

## 2020-03-02 DIAGNOSIS — K21.9 GASTRIC REFLUX: ICD-10-CM

## 2020-03-02 DIAGNOSIS — M79.7 FIBROMYALGIA: ICD-10-CM

## 2020-03-02 DIAGNOSIS — L72.11 PILAR CYST: ICD-10-CM

## 2020-03-02 DIAGNOSIS — E55.9 VITAMIN D DEFICIENCY: ICD-10-CM

## 2020-03-02 DIAGNOSIS — I70.90 ARTERIOSCLEROTIC VASCULAR DISEASE: ICD-10-CM

## 2020-03-02 DIAGNOSIS — R29.818 SUSPECTED SLEEP APNEA: ICD-10-CM

## 2020-03-02 DIAGNOSIS — Z00.00 HEALTHCARE MAINTENANCE: Primary | ICD-10-CM

## 2020-03-02 DIAGNOSIS — R40.0 DAYTIME SOMNOLENCE: ICD-10-CM

## 2020-03-02 DIAGNOSIS — L30.1 VESICULAR PALMOPLANTAR ECZEMA: ICD-10-CM

## 2020-03-02 DIAGNOSIS — I65.23 BILATERAL CAROTID ARTERY STENOSIS: ICD-10-CM

## 2020-03-02 DIAGNOSIS — I25.10 CORONARY ARTERY DISEASE INVOLVING NATIVE CORONARY ARTERY OF NATIVE HEART WITHOUT ANGINA PECTORIS: ICD-10-CM

## 2020-03-02 DIAGNOSIS — Z95.1 S/P CABG X 1: ICD-10-CM

## 2020-03-02 DIAGNOSIS — I10 ESSENTIAL HYPERTENSION: ICD-10-CM

## 2020-03-02 DIAGNOSIS — E78.2 MIXED HYPERLIPIDEMIA: ICD-10-CM

## 2020-03-02 DIAGNOSIS — M85.89 OSTEOPENIA OF MULTIPLE SITES: ICD-10-CM

## 2020-03-02 PROCEDURE — 99212 OFFICE O/P EST SF 10 MIN: CPT | Performed by: INTERNAL MEDICINE

## 2020-03-02 PROCEDURE — 99396 PREV VISIT EST AGE 40-64: CPT | Performed by: INTERNAL MEDICINE

## 2020-03-02 NOTE — PROGRESS NOTES
"Annual Exam (review of medical issues ); Nausea; and Sleeping Problem      HPI  Bernice Dunlap is a 64 y.o. female RTC in yearly CPE, review of medical issues:   1. ASCVD - [place] for C/P and SOA 10/2019. Notes that has been making changes. \"I go to the gym. I dont smoke. I am eating better\".  Feels like doing well. Weight has been stable but clothes fit differently. No more chest pain events. Breathing well at the gym.  Sees Cards in 6/2020.  No more side effects from Brillinta. 'I guess I finally got used to it\".   2. Nephrolithiasis - new issue 7/2018, seen in ED at [place]. Has some pain through L flank.  Notes better than it was.    3. HTN - controlled on one drug. BMP OK.   4. Carotid Artery Stenosis, 16-49% B on 1/2018 U/S - 1-15% --> 16-49% B 1/2018 - asx'ic. No TIA sx.   5. HLD - on high dose Crestor.   6. Fibromyalgia - long term controlled on Lyrica. Off Lyrica and noted had some sx coming off of it. However, is controlled overall and is active at gym. Feels like does well overall. Will have 'some pain, it is not bad though'.   7. Pomphylox eczema - resolved, 'it never did come back\".  Off all topicals at this time.    8. Migraine HA - CIARA for years.   9. Vitamin D deficiency - on 2000 I.U. Daily at this time.  10. Gastric Reflux - on PPI daily, uses carafate 3x/ week now. 'I thought I was supposed to take it every day.. I kind of dropped of taking it to a few times/ week\".  Was not really sure what carafate was for.  Will have 'upset stomach' and has had a lot this week. Has not tried H2 blocker yet.  \"No, I have not tried any of that'. Never had EGD in past that recalls. Feels like this week has been nauseous throught week, since Friday. Wonders if had bug. No vomiting. No fevers. Swallowing OK. Has taken Oneil Bailey for nausea and did help.  11.  Insomnia - notes that is 'not sleeping right now'.  Feels like sleep in last week has been bad.  Lost mom 2 weeks ago.  Had some variable issues prior to " "mother sick but worse in last few weeks.  Has not gotten to clear trigger. Never has done sleep study.  Not uncommon to wake up and feel like has been up all night.  \"I have been told I snore\". Is taking trazodone nightly. Will have some anxiety around sleep and if will get sleep.     Review of Systems   Constitution: Positive for malaise/fatigue and weight loss (clothes fit better). Negative for chills, fever and weight gain.   HENT: Negative for congestion, hearing loss, hoarse voice, odynophagia and sore throat.    Eyes: Negative for discharge, double vision, pain and redness.        Last eye exam ~11/2019; wears glasses     Cardiovascular: Negative for chest pain, dyspnea on exertion, irregular heartbeat, leg swelling, near-syncope, palpitations and syncope.   Respiratory: Positive for snoring. Negative for cough and sleep disturbances due to breathing.    Endocrine: Negative for polydipsia, polyphagia and polyuria.   Hematologic/Lymphatic: Negative for bleeding problem. Bruises/bleeds easily (bruise 'real easy'. ).   Skin: Negative for rash and suspicious lesions.   Musculoskeletal: Positive for myalgias (with FM, controlled; mild). Negative for joint pain, joint swelling, muscle cramps and muscle weakness.   Gastrointestinal: Positive for heartburn (variable) and nausea. Negative for constipation, diarrhea, dysphagia and vomiting.   Genitourinary: Negative for bladder incontinence, dysuria, frequency, hematuria, hesitancy, nocturia and non-menstrual bleeding.   Neurological: Positive for excessive daytime sleepiness. Negative for dizziness and light-headedness.   Psychiatric/Behavioral: Negative for depression. The patient has insomnia. The patient is not nervous/anxious.    Allergic/Immunologic: Negative for environmental allergies and persistent infections.       Problem List:    Patient Active Problem List   Diagnosis   • Arteriosclerotic vascular disease   • Bladder prolapse, female, acquired   • " Fibromyalgia   • Hyperlipidemia   • Displacement of lumbar intervertebral disc without myelopathy   • Migraine   • Pilar cyst   • Vesicular palmoplantar eczema   • Pre-diabetes   • Vitamin D deficiency   • S/P CABG x 1   • Bilateral carotid artery stenosis   • Internal bleeding hemorrhoids   • Gastric reflux   • Nephrolithiasis   • Osteopenia of multiple sites   • Coronary artery disease involving native coronary artery of native heart without angina pectoris   • Essential hypertension       Medical History:    Past Medical History:   Diagnosis Date   • Arteriosclerosis of carotid artery     1-15% B 2013 U/S --> 16-49% bilaterally in    • Arteriosclerotic cardiovascular disease (ASCVD)     MI ; s/p CABG x 1    • Benign essential hypertension    • BPPV (benign paroxysmal positional vertigo)    • Fibromyalgia    • History of nephrolithiasis     on L; non-obstructing on  CT; reidentified on X-ray in    • Hydrothorax 3/11/2016   • Hyperlipidemia    • Migraine headache    • Myocardial infarction (CMS/HCC)     Onset Date:    • Pleural effusion 3/11/2016   • Pre-diabetes    • Stress and adjustment reaction 3/11/2016   • Vitamin D deficiency         Social History:    Social History     Socioeconomic History   • Marital status:      Spouse name: Not on file   • Number of children: 1   • Years of education: Not on file   • Highest education level: Not on file   Occupational History   • Occupation: Assistant     Comment: Retired [email or web address]   • Occupation:      Comment: [email or web address]   Tobacco Use   • Smoking status: Former Smoker     Last attempt to quit: 2019     Years since quittin.1   • Smokeless tobacco: Never Used   • Tobacco comment: 6 pk/yr hx; occasionally smokes; quit 2019   Substance and Sexual Activity   • Alcohol use: No     Comment: caffeine use minimal   • Drug use: No   • Sexual activity: Not Currently     Partners: Male     Comment: no hx STD's    Lifestyle   • Physical activity:     Days per week: 3 days     Minutes per session: 50 min   • Stress: Not on file   Social History Narrative    Diet - overall healthy,eating fruits and veggies, does eat on the run too often    Exercise - walking daily in past; variable in 2018 with 2x/ week at gym --> 3x/ week in 2020    Caffeine - occasional tea       Family History:   Family History   Problem Relation Age of Onset   • Heart disease Mother         Arteriosclerotic cardiovascular disease; dx'd in 50's   • Alcohol abuse Father         Alcoholism   • COPD Father    • Depression Sister    • Leukemia Sister    • Diabetes Sister         Type 2 Diabetes Mellitus   • Alcohol abuse Brother         Alcoholism   • Hypertension Brother         Benign Essential Hypertension   • Glaucoma Brother    • Hyperlipidemia Brother    • No Known Problems Son    • No Known Problems Maternal Grandmother    • No Known Problems Maternal Grandfather    • No Known Problems Paternal Grandmother    • No Known Problems Paternal Grandfather        Surgical History:   Past Surgical History:   Procedure Laterality Date   • APPENDECTOMY     • CHOLECYSTECTOMY      Laparoscopic   • CORONARY ANGIOPLASTY  10/2019   • CORONARY ARTERY BYPASS GRAFT      1 vessel bypass. Mammary artery to the LAD; 2007; nl stress in 2014   • HYSTERECTOMY     • SALPINGO OOPHORECTOMY Bilateral    • THORACOSCOPY      With Pleurodesis (Therapeutic) - 2012 after traumatic effusion; Dr. Tomas         Current Outpatient Medications:   •  amLODIPine (NORVASC) 5 MG tablet, Take 5 mg by mouth Daily., Disp: , Rfl:   •  Cholecalciferol (HM VITAMIN D3) 2000 units capsule, Take 2,000 Units by mouth Daily., Disp: 30 each, Rfl:   •  losartan (COZAAR) 50 MG tablet, Take 1 tablet by mouth Daily., Disp: 30 tablet, Rfl: 5  •  metoprolol succinate XL (TOPROL-XL) 50 MG 24 hr tablet, TAKE 1 TABLET BY MOUTH DAILY, Disp: 90 tablet, Rfl: 0  •  omeprazole (priLOSEC) 40 MG capsule, Take 1 capsule by  mouth Daily., Disp: 30 capsule, Rfl: 3  •  psyllium (METAMUCIL) 58.6 % powder, Take  by mouth Daily., Disp: , Rfl: 12  •  rosuvastatin (CRESTOR) 40 MG tablet, Take 1 tablet by mouth Daily., Disp: 30 tablet, Rfl: 5  •  ticagrelor (BRILINTA) 90 MG tablet tablet, Take 90 mg by mouth 2 (Two) Times a Day., Disp: , Rfl:   •  traZODone (DESYREL) 50 MG tablet, TAKE 1 TABLET BY MOUTH EVERY NIGHT AT BEDTIME, Disp: 30 tablet, Rfl: 0    Current Facility-Administered Medications:   •  nitroglycerin (NITROSTAT) SL tablet 0.4 mg, 0.4 mg, Sublingual, Q5 Min PRN, Jamar Clark MD  •  sodium chloride 0.9 % flush 10 mL, 10 mL, Intravenous, PRN, Jamar Clark MD    Vitals:    03/02/20 1353   BP: 120/70   Pulse: 71   Resp: 12   Temp: 98.9 °F (37.2 °C)   SpO2: 98%     Body mass index is 27.81 kg/m².    Physical Exam   Constitutional: She is oriented to person, place, and time. She appears well-developed and well-nourished. No distress.   HENT:   Head: Normocephalic and atraumatic.   Right Ear: Hearing, tympanic membrane, external ear and ear canal normal.   Left Ear: Hearing, tympanic membrane, external ear and ear canal normal.   Nose: Nose normal.   Mouth/Throat: Uvula is midline, oropharynx is clear and moist and mucous membranes are normal.   Class III Mallampati  Large keloids noted on hard palate     Eyes: Pupils are equal, round, and reactive to light. Conjunctivae, EOM and lids are normal. Right eye exhibits no discharge. Left eye exhibits no discharge. No scleral icterus.   Neck: Trachea normal, normal range of motion and full passive range of motion without pain. Neck supple. Carotid bruit is not present. No thyroid mass and no thyromegaly present.   Cardiovascular: Normal rate, regular rhythm, S1 normal, S2 normal, normal heart sounds and intact distal pulses. Exam reveals no gallop and no friction rub.   No murmur heard.  Pulses:       Radial pulses are 2+ on the right side, and 2+ on the left side.        Dorsalis  pedis pulses are 2+ on the right side, and 2+ on the left side.        Posterior tibial pulses are 2+ on the right side, and 2+ on the left side.   Pulmonary/Chest: Effort normal and breath sounds normal. No respiratory distress. She has no wheezes. She has no rhonchi. She has no rales. She exhibits deformity (central sternontomy scar well healed). She exhibits no mass, no tenderness and no crepitus. Right breast exhibits no inverted nipple, no mass, no nipple discharge and no skin change. Left breast exhibits no inverted nipple, no mass, no nipple discharge and no skin change.   Abdominal: Soft. Normal appearance and bowel sounds are normal. She exhibits no distension and no mass. There is no hepatosplenomegaly. There is no tenderness. There is no rebound and no guarding.   Musculoskeletal: Normal range of motion. She exhibits no edema.     Vascular Status -  Her right foot exhibits normal foot vasculature  and no edema. Her left foot exhibits normal foot vasculature  and no edema.  Lymphadenopathy:     She has no cervical adenopathy.     She has no axillary adenopathy.        Right: No inguinal adenopathy present.        Left: No inguinal adenopathy present.   Neurological: She is alert and oriented to person, place, and time. She has normal strength and normal reflexes. She displays no tremor. No cranial nerve deficit or sensory deficit. She exhibits normal muscle tone. Gait normal.   Skin: Skin is warm and dry. No rash noted.        Psychiatric: She has a normal mood and affect. Her behavior is normal. Thought content normal. Cognition and memory are normal.   Vitals reviewed.      Assessment/ Plan  Diagnoses and all orders for this visit:    Healthcare maintenance  -     Mammo Screening Bilateral With CAD; Future  -     Ambulatory Referral For Screening Colonoscopy    Arteriosclerotic vascular disease    Bilateral carotid artery stenosis  -     Duplex Carotid Ultrasound CAR; Future    Coronary artery disease  involving native coronary artery of native heart without angina pectoris    Essential hypertension    Mixed hyperlipidemia    Migraine without aura and without status migrainosus, not intractable    Gastric reflux    Vitamin D deficiency    Fibromyalgia    Osteopenia of multiple sites    Vesicular palmoplantar eczema    Nephrolithiasis    Pre-diabetes    S/P CABG x 1    Pilar cyst    Suspected sleep apnea  -     Ambulatory Referral to Sleep Medicine    Daytime somnolence  -     Ambulatory Referral to Sleep Medicine        Return in about 6 months (around 9/2/2020) for Recheck.      Discussion:  Bernice Dunlap is a 64 y.o. female RTC in yearly CPE, review of medical issues:   1. ASCVD s/p CABG in 2008 and (-) stress in 2016 and 11/2018; inpt admit 10/2019 with (+) stress test and new mid RCA lesion  S/p PTCA x 1 - doing well. Has taken to heart need to modify risk factors. Is no longer smoking. Is at gym 3x/ week. Diet is better.  I congratulated pt on efforts. Tolerating Brilinta.    2. Nephrolithiasis - new issue 7/2018, seen in ED at [place]. Remains with retained 5mm stone on CT 2/2019 on L.  3. HTN - controlled on one drug. BMP OK.   4. Carotid Artery Stenosis, 16-49% B on 1/2018 U/S - 1-15% --> 16-49% B 1/2018 - asx'ic.  Repeat U/S due, order placed. Off tobacco and high dose statin and Brillinta daily.   5. HLD - LDL at goal on high dose Crestor. LFT's OK.   6. Pre-DM - A1C and FBS normalized on labs with noted TLC. Good news. C/W efforts.   7. Sudden onset insomnia 10/2017, etiology unclear but suspected poor sleep hygiene habits - was partially controlled on Trazodone, but now is off habits with passing of mother and stress of caring for her up to her passing. I do think all confounded by PATRICIA by hx. Will have pt complete sleep study. OK for current trazodone now, but no additional sedatives given concern for PATRICIA.   8. Hx of  BRBPR noted on TP several events in 2017 - no recurrence with Metamucil (3x/ week)  and daily oatmeal.  9. Fibromyalgia - long term controlled on Lyrica, now off and doing well. C/W exercise program.   10. Pomphylox eczema - resolved.      11. Migraine HA - CIARA.   12. Pilar Cyst - on scalp, declines plastic surgery eval.   13. Vitamin D deficiency - not replete on 2000 I.U. Daily at this time. Increase to 4000 I.U. Daily, trend next labs.   14. Gastric Reflux - on PPI daily, uses carafate 3x/ week now. D/C Carafate and d/w pt is not long term med.  Add Pepcid PRN and track use.  Nausea from this week non-specific, ? Mild gastroenteritiis. Call if not better or any vomiting.   15. HM - labs d/w pt; Flu/Tdap / Pvax/ Hep A #1  - UTD; Shingrix at pharmacy; C-scope 2010 (-) --> 10 years, refer; Mammo due, refer; Pap D/C'd s/p LEONORA; DEXA with mild osteopenia, low FRAX, repeat 2/2021; Hep C Ab (-) 2014; add more exercise    RTC 6 months, F labs prior (CMP, A1C, Vit D)

## 2020-03-02 NOTE — PATIENT INSTRUCTIONS
Increase Vit D to 4000 I.U. Daily  OK to add Pepcid PRN for heartburn breakthrough, track use.   Continue work at gym and off tobacco  Shingrix (new shingles shot; 2 shot series) check at pharmacy  Schedule Colonoscopy  Schedule Mammogram  Wait for sleep medicine appointment      Medicare Wellness  Personal Prevention Plan of Service     Date of Office Visit:  2020  Encounter Provider:  Bryce Morales MD  Place of Service:  [facility] INTERNAL MEDICINE  Patient Name: Bernice Dunlap  :  1955    As part of the Medicare Wellness portion of your visit today, we are providing you with this personalized preventive plan of services (PPPS). This plan is based upon recommendations of the [place] Preventive Services Task Force (USPSTF) and the Advisory Committee on Immunization Practices (ACIP).    This lists the preventive care services that should be considered, and provides dates of when you are due. Items listed as completed are up-to-date and do not require any further intervention.    Health Maintenance   Topic Date Due   • ZOSTER VACCINE (1 of 2) 2006   • MAMMOGRAM  2017   • COLONOSCOPY  2020   • LIPID PANEL  2021   • ANNUAL PHYSICAL  2021   • TDAP/TD VACCINES (2 - Td) 2022   • HEPATITIS C SCREENING  Completed   • INFLUENZA VACCINE  Completed   • PAP SMEAR  Discontinued       Orders Placed This Encounter   Procedures   • Mammo Screening Bilateral With CAD     Standing Status:   Future     Standing Expiration Date:   3/2/2021     Order Specific Question:   Reason for Exam:     Answer:   Screening   • Ambulatory Referral to Sleep Medicine     Referral Priority:   Routine     Referral Type:   Consultation     Referral Reason:   Specialty Services Required     Requested Specialty:   Sleep Medicine     Number of Visits Requested:   1       Return in about 6 months (around 2020) for Recheck.

## 2020-03-16 RX ORDER — LOSARTAN POTASSIUM 50 MG/1
50 TABLET ORAL DAILY
Qty: 30 TABLET | Refills: 5 | Status: SHIPPED | OUTPATIENT
Start: 2020-03-16 | End: 2020-10-20 | Stop reason: SDUPTHER

## 2020-03-24 ENCOUNTER — APPOINTMENT (OUTPATIENT)
Dept: SLEEP MEDICINE | Facility: HOSPITAL | Age: 65
End: 2020-03-24

## 2020-04-06 ENCOUNTER — APPOINTMENT (OUTPATIENT)
Dept: SLEEP MEDICINE | Facility: HOSPITAL | Age: 65
End: 2020-04-06

## 2020-04-15 ENCOUNTER — HOSPITAL ENCOUNTER (OUTPATIENT)
Dept: MAMMOGRAPHY | Facility: HOSPITAL | Age: 65
End: 2020-04-15

## 2020-04-26 DIAGNOSIS — I10 BENIGN ESSENTIAL HYPERTENSION: ICD-10-CM

## 2020-04-27 RX ORDER — METOPROLOL SUCCINATE 50 MG/1
TABLET, EXTENDED RELEASE ORAL
Qty: 90 TABLET | Refills: 0 | Status: SHIPPED | OUTPATIENT
Start: 2020-04-27 | End: 2020-07-27

## 2020-05-06 ENCOUNTER — PREP FOR SURGERY (OUTPATIENT)
Dept: OTHER | Facility: HOSPITAL | Age: 65
End: 2020-05-06

## 2020-05-06 DIAGNOSIS — Z12.11 SCREEN FOR COLON CANCER: Primary | ICD-10-CM

## 2020-05-18 ENCOUNTER — APPOINTMENT (OUTPATIENT)
Dept: SLEEP MEDICINE | Facility: HOSPITAL | Age: 65
End: 2020-05-18

## 2020-06-05 DIAGNOSIS — E78.2 MIXED HYPERLIPIDEMIA: ICD-10-CM

## 2020-06-05 DIAGNOSIS — I70.90 ARTERIOSCLEROTIC VASCULAR DISEASE: ICD-10-CM

## 2020-06-05 RX ORDER — ROSUVASTATIN CALCIUM 40 MG/1
40 TABLET, COATED ORAL DAILY
Qty: 30 TABLET | Refills: 5 | Status: SHIPPED | OUTPATIENT
Start: 2020-06-05 | End: 2020-12-04 | Stop reason: SDUPTHER

## 2020-06-08 PROBLEM — Z12.11 SCREEN FOR COLON CANCER: Status: ACTIVE | Noted: 2020-06-08

## 2020-06-12 ENCOUNTER — OFFICE VISIT (OUTPATIENT)
Dept: INTERNAL MEDICINE | Facility: CLINIC | Age: 65
End: 2020-06-12

## 2020-06-12 ENCOUNTER — TELEPHONE (OUTPATIENT)
Dept: INTERNAL MEDICINE | Facility: CLINIC | Age: 65
End: 2020-06-12

## 2020-06-12 VITALS
OXYGEN SATURATION: 98 % | DIASTOLIC BLOOD PRESSURE: 70 MMHG | TEMPERATURE: 97.5 F | SYSTOLIC BLOOD PRESSURE: 130 MMHG | WEIGHT: 155 LBS | BODY MASS INDEX: 27.46 KG/M2 | HEIGHT: 63 IN | HEART RATE: 68 BPM

## 2020-06-12 DIAGNOSIS — M54.50 ACUTE BILATERAL LOW BACK PAIN WITHOUT SCIATICA: Primary | ICD-10-CM

## 2020-06-12 DIAGNOSIS — S39.012A STRAIN OF LUMBAR PARASPINAL MUSCLE, INITIAL ENCOUNTER: ICD-10-CM

## 2020-06-12 PROCEDURE — 99214 OFFICE O/P EST MOD 30 MIN: CPT | Performed by: INTERNAL MEDICINE

## 2020-06-12 RX ORDER — CYCLOBENZAPRINE HCL 5 MG
5 TABLET ORAL 3 TIMES DAILY PRN
Qty: 30 TABLET | Refills: 1 | Status: SHIPPED | OUTPATIENT
Start: 2020-06-12 | End: 2020-09-08

## 2020-06-12 RX ORDER — METHYLPREDNISOLONE 4 MG/1
TABLET ORAL
Qty: 21 TABLET | Refills: 0 | Status: SHIPPED | OUTPATIENT
Start: 2020-06-12 | End: 2020-09-08

## 2020-06-12 NOTE — PROGRESS NOTES
Leg Pain (right) and Back Pain      HPI  Bernice Dunlap is a 65 y.o. female RTC in acute care:   Started 2 days ago with some pain in R and L low back. Got out of bed feeling that way.  Yesterday R low back and R leg hurt.  Slept last night with leg propped up.  Today pain is in L low back.  No issues with L leg.  Feels stiff all over. 'I dont think I have felt this bad pain wise ever'.  This AM hard to walk due to pain.   No trauma, no falls.   Day prior to onset of pain no stressful activity.    No skin lesions or rashes.    Had some mild neck pain, but that is 'from laying on the cough'.   No shoulder pain with this. No pelvic or hip pain. Calls location of pain 'hips' but rubs across low back in band like pattern.     Meds: Ibuprofen, Aleve, Tylenol. Voltaren gel.  Epsom salt baths. Only thing that helped was epsom salt baths.       Review of Systems   Constitution: Negative for chills and fever.   Skin: Negative for rash and suspicious lesions.   Musculoskeletal: Positive for back pain, joint pain (R leg) and stiffness (in back). Negative for muscle weakness.   Genitourinary: Negative for dysuria, frequency, hematuria and hesitancy.   Neurological: Negative for focal weakness.       The following portions of the patient's history were reviewed and updated as appropriate: allergies, current medications, past medical history, past social history and problem list.      Current Outpatient Medications:   •  amLODIPine (NORVASC) 5 MG tablet, Take 5 mg by mouth Daily., Disp: , Rfl:   •  Cholecalciferol (HM VITAMIN D3) 2000 units capsule, Take 2,000 Units by mouth Daily., Disp: 30 each, Rfl:   •  losartan (COZAAR) 50 MG tablet, Take 1 tablet by mouth Daily., Disp: 30 tablet, Rfl: 5  •  metoprolol succinate XL (TOPROL-XL) 50 MG 24 hr tablet, TAKE 1 TABLET BY MOUTH DAILY, Disp: 90 tablet, Rfl: 0  •  omeprazole (priLOSEC) 40 MG capsule, Take 1 capsule by mouth Daily., Disp: 30 capsule, Rfl: 3  •  psyllium (METAMUCIL) 58.6  "% powder, Take  by mouth Daily., Disp: , Rfl: 12  •  rosuvastatin (CRESTOR) 40 MG tablet, TAKE 1 TABLET BY MOUTH DAILY, Disp: 30 tablet, Rfl: 5  •  ticagrelor (BRILINTA) 90 MG tablet tablet, Take 90 mg by mouth 2 (Two) Times a Day., Disp: , Rfl:   •  traZODone (DESYREL) 50 MG tablet, TAKE 1 TABLET BY MOUTH EVERY NIGHT AT BEDTIME, Disp: 30 tablet, Rfl: 0  •  cyclobenzaprine (FLEXERIL) 5 MG tablet, Take 1 tablet by mouth 3 (Three) Times a Day As Needed for Muscle Spasms., Disp: 30 tablet, Rfl: 1  •  methylPREDNISolone (Medrol) 4 MG tablet, Take as directed on package instructions, Disp: 21 tablet, Rfl: 0    Current Facility-Administered Medications:   •  nitroglycerin (NITROSTAT) SL tablet 0.4 mg, 0.4 mg, Sublingual, Q5 Min PRN, Jamar Clark MD  •  sodium chloride 0.9 % flush 10 mL, 10 mL, Intravenous, PRN, Jamar Clark MD    Vitals:    06/12/20 1347   BP: 130/70   Pulse: 68   Temp: 97.5 °F (36.4 °C)   SpO2: 98%   Weight: 70.3 kg (155 lb)   Height: 160 cm (63\")     Body mass index is 27.46 kg/m².      Physical Exam   Constitutional: She is oriented to person, place, and time. She appears well-developed and well-nourished. No distress.   HENT:   Head: Normocephalic.   Pulmonary/Chest: Effort normal. No respiratory distress.   Musculoskeletal:        Right hip: She exhibits normal range of motion, normal strength, no tenderness and no bony tenderness.        Left hip: She exhibits normal range of motion, normal strength, no tenderness and no bony tenderness.        Lumbar back: She exhibits pain (when laying back on table or when lifting L leg to resistance). She exhibits normal range of motion, no tenderness, no bony tenderness, no edema and no deformity.   Neurological: She is alert and oriented to person, place, and time. She has normal strength. She exhibits normal muscle tone. Gait normal.   Reflex Scores:       Patellar reflexes are 1+ on the right side and 1+ on the left side.       Achilles reflexes " "are 1+ on the right side and 1+ on the left side.  5/5/ BLE strength proximal and distal     Skin: No rash (over low back) noted.   Psychiatric: She has a normal mood and affect. Her behavior is normal. Thought content normal.   Vitals reviewed.      Assessment/ Plan  Diagnoses and all orders for this visit:    Acute bilateral low back pain without sciatica  -     methylPREDNISolone (Medrol) 4 MG tablet; Take as directed on package instructions  -     cyclobenzaprine (FLEXERIL) 5 MG tablet; Take 1 tablet by mouth 3 (Three) Times a Day As Needed for Muscle Spasms.    Strain of lumbar paraspinal muscle, initial encounter  -     methylPREDNISolone (Medrol) 4 MG tablet; Take as directed on package instructions  -     cyclobenzaprine (FLEXERIL) 5 MG tablet; Take 1 tablet by mouth 3 (Three) Times a Day As Needed for Muscle Spasms.        Return for Next scheduled follow up.      Discussion:  Bernice Dunlap is a 65 y.o. female RTC in acute care (new issue to examiner) with acute onset of low back pain 2 days ago, no inciting event. Describes pain initially as \"all over\" but when pt is questioned specifically, pain only noted in bandlike distribution on low back and, yesterday, down R leg. Pt has intact neuromuscular exam today and has typical pain onset in low back when lying back on table and flexing against resistance at hip, though pain hx a bit out of proportion to exam. I really think this is typical low back muscle strain.  I think PMR or disc issue is much less likely.  Will have pt start steroid taper and muscle relaxer PRN with PRN Tylenol at this time. Pt has taken numerous NSAIDs in last few days and would like her to take a break on NSAIDs, using only sparingly for breakthrough at this point.  Will RTC or call if not better.       "

## 2020-06-16 ENCOUNTER — APPOINTMENT (OUTPATIENT)
Dept: MAMMOGRAPHY | Facility: HOSPITAL | Age: 65
End: 2020-06-16

## 2020-07-05 DIAGNOSIS — M25.512 ACUTE PAIN OF BOTH SHOULDERS: ICD-10-CM

## 2020-07-05 DIAGNOSIS — M25.511 ACUTE PAIN OF BOTH SHOULDERS: ICD-10-CM

## 2020-07-05 DIAGNOSIS — K21.9 GASTRIC REFLUX: ICD-10-CM

## 2020-07-05 DIAGNOSIS — R07.89 CHEST PRESSURE: ICD-10-CM

## 2020-07-05 RX ORDER — OMEPRAZOLE 40 MG/1
CAPSULE, DELAYED RELEASE ORAL
Qty: 30 CAPSULE | Refills: 3 | Status: SHIPPED | OUTPATIENT
Start: 2020-07-05 | End: 2021-07-12

## 2020-07-27 DIAGNOSIS — I10 BENIGN ESSENTIAL HYPERTENSION: ICD-10-CM

## 2020-07-27 RX ORDER — METOPROLOL SUCCINATE 50 MG/1
TABLET, EXTENDED RELEASE ORAL
Qty: 90 TABLET | Refills: 2 | Status: SHIPPED | OUTPATIENT
Start: 2020-07-27 | End: 2021-03-22 | Stop reason: SDUPTHER

## 2020-08-26 DIAGNOSIS — E55.9 VITAMIN D DEFICIENCY: Primary | ICD-10-CM

## 2020-08-26 DIAGNOSIS — R73.03 PRE-DIABETES: ICD-10-CM

## 2020-08-26 DIAGNOSIS — I10 ESSENTIAL HYPERTENSION: ICD-10-CM

## 2020-09-02 LAB
25(OH)D3+25(OH)D2 SERPL-MCNC: 35.2 NG/ML (ref 30–100)
ALBUMIN SERPL-MCNC: 4.6 G/DL (ref 3.5–5.2)
ALBUMIN/GLOB SERPL: 2.1 G/DL
ALP SERPL-CCNC: 68 U/L (ref 39–117)
ALT SERPL-CCNC: 16 U/L (ref 1–33)
AST SERPL-CCNC: 20 U/L (ref 1–32)
BILIRUB SERPL-MCNC: 0.6 MG/DL (ref 0–1.2)
BUN SERPL-MCNC: 13 MG/DL (ref 8–23)
BUN/CREAT SERPL: 16.9 (ref 7–25)
CALCIUM SERPL-MCNC: 9.5 MG/DL (ref 8.6–10.5)
CHLORIDE SERPL-SCNC: 103 MMOL/L (ref 98–107)
CO2 SERPL-SCNC: 26.4 MMOL/L (ref 22–29)
CREAT SERPL-MCNC: 0.77 MG/DL (ref 0.57–1)
GLOBULIN SER CALC-MCNC: 2.2 GM/DL
GLUCOSE SERPL-MCNC: 97 MG/DL (ref 65–99)
HBA1C MFR BLD: 5.54 % (ref 4.8–5.6)
POTASSIUM SERPL-SCNC: 4.6 MMOL/L (ref 3.5–5.2)
PROT SERPL-MCNC: 6.8 G/DL (ref 6–8.5)
SODIUM SERPL-SCNC: 138 MMOL/L (ref 136–145)

## 2020-09-08 ENCOUNTER — TELEPHONE (OUTPATIENT)
Dept: INTERNAL MEDICINE | Facility: CLINIC | Age: 65
End: 2020-09-08

## 2020-09-08 ENCOUNTER — OFFICE VISIT (OUTPATIENT)
Dept: INTERNAL MEDICINE | Facility: CLINIC | Age: 65
End: 2020-09-08

## 2020-09-08 VITALS
TEMPERATURE: 97.1 F | WEIGHT: 155 LBS | OXYGEN SATURATION: 98 % | HEIGHT: 63 IN | HEART RATE: 85 BPM | DIASTOLIC BLOOD PRESSURE: 80 MMHG | SYSTOLIC BLOOD PRESSURE: 130 MMHG | BODY MASS INDEX: 27.46 KG/M2

## 2020-09-08 DIAGNOSIS — I70.90 ARTERIOSCLEROTIC VASCULAR DISEASE: ICD-10-CM

## 2020-09-08 DIAGNOSIS — I10 ESSENTIAL HYPERTENSION: Primary | ICD-10-CM

## 2020-09-08 DIAGNOSIS — R73.03 PRE-DIABETES: ICD-10-CM

## 2020-09-08 DIAGNOSIS — Z00.00 HEALTHCARE MAINTENANCE: ICD-10-CM

## 2020-09-08 DIAGNOSIS — I25.10 CORONARY ARTERY DISEASE INVOLVING NATIVE CORONARY ARTERY OF NATIVE HEART WITHOUT ANGINA PECTORIS: ICD-10-CM

## 2020-09-08 DIAGNOSIS — I65.23 BILATERAL CAROTID ARTERY STENOSIS: ICD-10-CM

## 2020-09-08 DIAGNOSIS — E55.9 VITAMIN D DEFICIENCY: ICD-10-CM

## 2020-09-08 PROCEDURE — 90472 IMMUNIZATION ADMIN EACH ADD: CPT | Performed by: INTERNAL MEDICINE

## 2020-09-08 PROCEDURE — 90471 IMMUNIZATION ADMIN: CPT | Performed by: INTERNAL MEDICINE

## 2020-09-08 PROCEDURE — 99214 OFFICE O/P EST MOD 30 MIN: CPT | Performed by: INTERNAL MEDICINE

## 2020-09-08 PROCEDURE — 90686 IIV4 VACC NO PRSV 0.5 ML IM: CPT | Performed by: INTERNAL MEDICINE

## 2020-09-08 PROCEDURE — 90632 HEPA VACCINE ADULT IM: CPT | Performed by: INTERNAL MEDICINE

## 2020-09-08 RX ORDER — CLOPIDOGREL BISULFATE 75 MG/1
75 TABLET ORAL DAILY
Qty: 30 TABLET | Refills: 5 | Status: SHIPPED | OUTPATIENT
Start: 2020-09-08 | End: 2021-03-08

## 2020-09-08 NOTE — PROGRESS NOTES
"Hypertension and Labs Only      HPI  Bernice Dunlap is a 65 y.o. female RTC In f/u:   Overall, has been doing well.   1. ASCVD s/p CABG in 2008 and (-) stress in 2016 and 11/2018; inpt admit 10/2019 with (+) stress test and new mid RCA lesion  S/p PTCA x 1 - doing well. No angina sx. 'no pain or anything like that'. Has some SOA issues with Brilinta and feels like \"I cant get my breath\". Often will only take one pill, instead of two. Feels better when only takes one pill. Will just take the one in AM now and not the night one.  SOA onset will come a 'little while after takes that med'.   2. HTN - on one drug. Has home log. Similar to today's numbers.   3. Pre-DM - Diet is 'better, exercise is getting there'.  Eating more fish. Eating less fried food.  Cutting out carbs as well, especially potatoes and Indonesian lamar type foods.  Really not a fast food eater.  Weight has been stable.   4. Vitamin D deficiency - not replete on 2000 I.U. Daily at this time. Increase to 4000 I.U. Daily, trend next labs.   5. HM - Needs flu shot today; has not had Hep A # 2 yet.     Review of Systems   Constitution: Negative for chills, fever, malaise/fatigue, weight gain and weight loss.   Cardiovascular: Negative for chest pain, dyspnea on exertion, irregular heartbeat, leg swelling, near-syncope, palpitations and syncope.   Respiratory: Positive for shortness of breath (when takes Brilinta).    Hematologic/Lymphatic: Negative for bleeding problem. Does not bruise/bleed easily.   Neurological: Negative for dizziness and light-headedness.       The following portions of the patient's history were reviewed and updated as appropriate: allergies, current medications, past medical history, past social history and problem list.      Current Outpatient Medications:   •  amLODIPine (NORVASC) 5 MG tablet, Take 5 mg by mouth Daily., Disp: , Rfl:   •  Cholecalciferol ( VITAMIN D3) 2000 units capsule, Take 2,000 Units by mouth Daily., Disp: 30 each, " "Rfl:   •  losartan (COZAAR) 50 MG tablet, Take 1 tablet by mouth Daily., Disp: 30 tablet, Rfl: 5  •  metoprolol succinate XL (TOPROL-XL) 50 MG 24 hr tablet, TAKE 1 TABLET BY MOUTH DAILY, Disp: 90 tablet, Rfl: 2  •  omeprazole (priLOSEC) 40 MG capsule, TAKE ONE CAPSULE BY MOUTH EVERY DAY, Disp: 30 capsule, Rfl: 3  •  psyllium (METAMUCIL) 58.6 % powder, Take  by mouth Daily., Disp: , Rfl: 12  •  rosuvastatin (CRESTOR) 40 MG tablet, TAKE 1 TABLET BY MOUTH DAILY, Disp: 30 tablet, Rfl: 5  •  ticagrelor (BRILINTA) 90 MG tablet tablet, Take 90 mg by mouth 2 (Two) Times a Day., Disp: , Rfl:   •  traZODone (DESYREL) 50 MG tablet, TAKE 1 TABLET BY MOUTH EVERY NIGHT AT BEDTIME, Disp: 30 tablet, Rfl: 0  No current facility-administered medications for this visit.     Vitals:    09/08/20 0836   BP: 130/80   Pulse: 85   Temp: 97.1 °F (36.2 °C)   SpO2: 98%   Weight: 70.3 kg (155 lb)   Height: 160 cm (63\")     Body mass index is 27.46 kg/m².      Physical Exam   Constitutional: She is oriented to person, place, and time. She appears well-developed and well-nourished. No distress.   HENT:   Head: Normocephalic and atraumatic.   Mouth/Throat: Oropharynx is clear and moist. No oropharyngeal exudate.   Large kay noted on hard palate.     Eyes: Pupils are equal, round, and reactive to light. Conjunctivae are normal.   Neck: Normal range of motion. Neck supple.   Cardiovascular: Normal rate, regular rhythm and normal heart sounds.   Pulmonary/Chest: Effort normal and breath sounds normal. No respiratory distress. She has no wheezes. She has no rales.   Musculoskeletal: She exhibits no edema.   Lymphadenopathy:     She has no cervical adenopathy.   Neurological: She is alert and oriented to person, place, and time. No cranial nerve deficit. She exhibits normal muscle tone. Gait normal.   Psychiatric: She has a normal mood and affect. Her behavior is normal.   Vitals reviewed.      Assessment/ Plan  Diagnoses and all orders for this " visit:    Essential hypertension    Healthcare maintenance  -     Fluarix/Fluzone/Afluria Quad>6 Months  -     Hepatitis A Vaccine Adult IM  -     Cancel: Fluarix/Fluzone/Afluria Quad>6 Months    Arteriosclerotic vascular disease    Bilateral carotid artery stenosis    Coronary artery disease involving native coronary artery of native heart without angina pectoris    Vitamin D deficiency    Pre-diabetes        Return in about 6 months (around 3/8/2021) for Annual physical.      Discussion:  Bernice Dunlap is a 65 y.o. female RTC In f/u:    1. ASCVD s/p CABG in 2008 and (-) stress in 2016 and 11/2018; inpt admit 10/2019 with (+) stress test and new mid RCA lesion  S/p PTCA x 1 - overall doing well, no angina sx.  DIet improved and active.  Has appt upcoming with Cards, but notes has really dealt with SOA after taking Brilinta for last year and only taking one pill daily instead of Rx'd BID.  Reviewed 1/2020 Cards note with plans for one year of Brilinta and then to clopidogrel. O2 good in office, 98% ambulatory O2 check today.  Will message Cards today to c/w current meds until 10/2020 appt or change to clopidogrel now.  Will f/u with pt.   2. HTN - controlled on one drug. BMP OK.   3. Carotid Artery Stenosis, 16-49% B on 1/2018 U/S; 1-15% --> 16-49% B 1/2018 - asx'ic.  Repeat U/S due, order placed, but deferred by pt. Off tobacco and on high dose statin and Brillinta daily at this time.   4. Pre-DM - A1C and FBS remain normalized on labs with noted TLC diet mods that are consistent now over time. C/W efforts, congratulated pt.   5. Vitamin D deficiency - replete on 4000 I.U. Daily at this time, after past uptitration of dosing.  C/W same.   6. HM - Flu and Hep A - UTD; Shingrix at pharmacy    RTC 6 months CPE, F labs prior    Addendum: D/W Cards via message. Advised to go ahead and change to Plavix now, off Brilinta. Rx called in. Pt notified.She will see Cards upcoming.

## 2020-09-08 NOTE — TELEPHONE ENCOUNTER
----- Message from Bryce Morales MD sent at 9/8/2020 12:41 PM EDT -----  Call pt. I heard from Cards. Stop Brilinta.  Start Plavix daily, called to Rx already.

## 2020-10-07 ENCOUNTER — OFFICE VISIT (OUTPATIENT)
Dept: CARDIOLOGY | Facility: CLINIC | Age: 65
End: 2020-10-07

## 2020-10-07 VITALS
BODY MASS INDEX: 27.46 KG/M2 | SYSTOLIC BLOOD PRESSURE: 142 MMHG | WEIGHT: 155 LBS | HEART RATE: 59 BPM | HEIGHT: 63 IN | DIASTOLIC BLOOD PRESSURE: 72 MMHG

## 2020-10-07 DIAGNOSIS — I10 ESSENTIAL HYPERTENSION: ICD-10-CM

## 2020-10-07 DIAGNOSIS — I25.10 CORONARY ARTERY DISEASE INVOLVING NATIVE CORONARY ARTERY OF NATIVE HEART WITHOUT ANGINA PECTORIS: Primary | ICD-10-CM

## 2020-10-07 DIAGNOSIS — E78.2 MIXED HYPERLIPIDEMIA: ICD-10-CM

## 2020-10-07 PROCEDURE — 93000 ELECTROCARDIOGRAM COMPLETE: CPT | Performed by: NURSE PRACTITIONER

## 2020-10-07 PROCEDURE — 99214 OFFICE O/P EST MOD 30 MIN: CPT | Performed by: NURSE PRACTITIONER

## 2020-10-07 NOTE — PROGRESS NOTES
Date of Office Visit: 10/07/2020  Encounter Provider: BILL Alcocer  Place of Service: [facility] CARDIOLOGY  Patient Name: Bernice Dunlap  :1955    Chief Complaint   Patient presents with   • Follow-up     9 month   • Coronary Artery Disease   :     HPI: Bernice Dunlap is a 65 y.o. female, known to me, who presents today for follow-up.  Old records have been obtained and reviewed by me.  She is a patient of Dr. Contreras's with past cardiac history significant for hyperlipidemia, hypertension, carotid artery disease, and coronary artery disease.  In , she had a myocardial infarction and subsequent CABG x 1 in .  In 2019, she underwent drug-eluting stent placement to her mid circumflex.  At that time, she had 50% disease in a small ZOIE but otherwise her coronaries looked okay and her LV function was normal.   She was last seen in the office by Dr. Contreras on 1/3/2020 at which time she was doing well with no complaints of angina or heart failure.  Dr. Contreras stopped her aspirin with plans to keep her on long-term ADP receptor blocker.  The plan was to switch her off of Brilinta and onto Plavix when she saw me in follow-up.  However, this was ultimately changed by her PCP due to noncompliance with the Brilinta.  She is here today for follow-up.   Overall she has been doing well.  She denies any chest pain, palpitations, edema, dizziness, or syncope.  She denies any bleeding difficulties or melena.  She has shortness of breath on exertion with walking up stairs that is unchanged.  She denies any PND or orthopnea.  Her only complaint is insomnia.  She has tried melatonin with no success.  She takes walks for exercise.  Unfortunately she is needing to have her toe operated on and this has limited her ability to exercise.      Past Medical History:   Diagnosis Date   • Arteriosclerosis of carotid artery     1-15% B 2013 U/S --> 16-49% bilaterally in    •  Arteriosclerotic cardiovascular disease (ASCVD)     MI ; s/p CABG x 1    • Benign essential hypertension    • BPPV (benign paroxysmal positional vertigo)    • Fibromyalgia    • History of nephrolithiasis     on L; non-obstructing on  CT; reidentified on X-ray in    • Hydrothorax 3/11/2016   • Hyperlipidemia    • Migraine headache    • Myocardial infarction (CMS/HCC)     Onset Date:    • Pleural effusion 3/11/2016   • Pre-diabetes    • Stress and adjustment reaction 3/11/2016   • Vitamin D deficiency        Past Surgical History:   Procedure Laterality Date   • APPENDECTOMY     • CHOLECYSTECTOMY      Laparoscopic   • CORONARY ANGIOPLASTY  10/2019   • CORONARY ARTERY BYPASS GRAFT      1 vessel bypass. Mammary artery to the LAD; ; nl stress in    • HYSTERECTOMY     • SALPINGO OOPHORECTOMY Bilateral    • THORACOSCOPY      With Pleurodesis (Therapeutic) -  after traumatic effusion; Dr. Tomas       Social History     Socioeconomic History   • Marital status:      Spouse name: Not on file   • Number of children: 1   • Years of education: Not on file   • Highest education level: Not on file   Occupational History   • Occupation: Assistant     Comment: [email or web address]   • Occupation:      Comment: [employer]   Tobacco Use   • Smoking status: Former Smoker     Quit date: 2019     Years since quittin.7   • Smokeless tobacco: Never Used   • Tobacco comment: 6 pk/yr hx; occasionally smokes; quit 2019   Substance and Sexual Activity   • Alcohol use: No     Comment: caffeine use minimal   • Drug use: No   • Sexual activity: Not Currently     Partners: Male     Comment: no hx STD's   Lifestyle   • Physical activity     Days per week: 3 days     Minutes per session: 50 min   • Stress: Not on file   Social History Narrative    Diet - overall healthy,eating fruits and veggies, does eat on the run too often    Exercise - walking daily in past; variable in 2018 with  2x/ week at gym --> 3x/ week in 2020    Caffeine - occasional tea       Family History   Problem Relation Age of Onset   • Heart disease Mother         Arteriosclerotic cardiovascular disease; dx'd in 50's   • Alcohol abuse Father         Alcoholism   • COPD Father    • Depression Sister    • Leukemia Sister    • Diabetes Sister         Type 2 Diabetes Mellitus   • Alcohol abuse Brother         Alcoholism   • Hypertension Brother         Benign Essential Hypertension   • Glaucoma Brother    • Hyperlipidemia Brother    • No Known Problems Son    • No Known Problems Maternal Grandmother    • No Known Problems Maternal Grandfather    • No Known Problems Paternal Grandmother    • No Known Problems Paternal Grandfather        Review of Systems   Constitution: Negative for chills, fever and malaise/fatigue.   Cardiovascular: Positive for dyspnea on exertion. Negative for chest pain, leg swelling, near-syncope, orthopnea, palpitations, paroxysmal nocturnal dyspnea and syncope.   Respiratory: Negative for cough and shortness of breath.    Musculoskeletal: Negative for joint pain, joint swelling and myalgias.   Gastrointestinal: Negative for abdominal pain, diarrhea, melena, nausea and vomiting.   Genitourinary: Negative for frequency and hematuria.   Neurological: Negative for light-headedness, numbness, paresthesias and seizures.   Allergic/Immunologic: Negative.    All other systems reviewed and are negative.      Allergies   Allergen Reactions   • Imitrex [Sumatriptan] Other (See Comments)     MI   • Lisinopril Cough         Current Outpatient Medications:   •  amLODIPine (NORVASC) 5 MG tablet, Take 5 mg by mouth Daily., Disp: , Rfl:   •  Cholecalciferol (HM VITAMIN D3) 2000 units capsule, Take 2,000 Units by mouth Daily., Disp: 30 each, Rfl:   •  clopidogrel (Plavix) 75 MG tablet, Take 1 tablet by mouth Daily., Disp: 30 tablet, Rfl: 5  •  losartan (COZAAR) 50 MG tablet, Take 1 tablet by mouth Daily., Disp: 30 tablet,  "Rfl: 5  •  metoprolol succinate XL (TOPROL-XL) 50 MG 24 hr tablet, TAKE 1 TABLET BY MOUTH DAILY, Disp: 90 tablet, Rfl: 2  •  omeprazole (priLOSEC) 40 MG capsule, TAKE ONE CAPSULE BY MOUTH EVERY DAY, Disp: 30 capsule, Rfl: 3  •  psyllium (METAMUCIL) 58.6 % powder, Take  by mouth Daily., Disp: , Rfl: 12  •  rosuvastatin (CRESTOR) 40 MG tablet, TAKE 1 TABLET BY MOUTH DAILY, Disp: 30 tablet, Rfl: 5  •  traZODone (DESYREL) 50 MG tablet, TAKE 1 TABLET BY MOUTH EVERY NIGHT AT BEDTIME, Disp: 30 tablet, Rfl: 0      Objective:     Vitals:    10/07/20 0916 10/07/20 0927   BP: 142/70 142/72   BP Location: Left arm Right arm   Pulse: 59    Weight: 70.3 kg (155 lb)    Height: 160 cm (63\")      Body mass index is 27.46 kg/m².    PHYSICAL EXAM:    Constitutional:       General: Not in acute distress.     Appearance: Well-developed. Not diaphoretic.   Eyes:      Pupils: Pupils are equal, round, and reactive to light.   HENT:      Head: Normocephalic and atraumatic.   Neck:      Thyroid: No thyromegaly.      Vascular: No JVD.   Pulmonary:      Effort: Pulmonary effort is normal. No respiratory distress.      Breath sounds: Normal breath sounds.   Cardiovascular:      Normal rate. Regular rhythm.   Pulses:     Intact distal pulses.   Abdominal:      General: Bowel sounds are normal. There is no distension.      Palpations: Abdomen is soft. There is no hepatomegaly or splenomegaly.      Tenderness: There is no abdominal tenderness.   Musculoskeletal: Normal range of motion.   Skin:     General: Skin is warm and dry.      Findings: No erythema.   Neurological:      Mental Status: Alert and oriented to person, place, and time.   Psychiatric:         Behavior: Behavior normal.         Judgment: Judgment normal.           ECG 12 Lead    Date/Time: 10/7/2020 9:30 AM  Performed by: Mariya Chappell APRN  Authorized by: Mariya Chappell APRN   Comparison: compared with previous ECG from 1/3/2020  Similar to previous ECG  Rhythm: sinus " rhythm  Rate: normal  BPM: 59  T flattening: aVL    Clinical impression: normal ECG  Comments: Indication: CAD              Assessment:       Diagnosis Plan   1. Coronary artery disease involving native coronary artery of native heart without angina pectoris  ECG 12 Lead   2. Essential hypertension     3. Mixed hyperlipidemia       Orders Placed This Encounter   Procedures   • ECG 12 Lead     This order was created via procedure documentation          Plan:       1.  Coronary artery disease.  She denies any symptoms of angina.  She is on good medical therapy.      2.  Hypertension.  Her blood pressure is stable.      3.  Hyperlipidemia.  Continue Crestor.      I think she is stable and doing well.  She denies any symptoms of angina or heart failure.  I am not going to make any changes, and she will follow-up with Dr. Contreras in 1 year or sooner if needed.      As always, it has been a pleasure to participate in your patient's care.      Sincerely,         BILL Storey

## 2020-10-20 RX ORDER — LOSARTAN POTASSIUM 50 MG/1
50 TABLET ORAL DAILY
Qty: 30 TABLET | Refills: 5 | Status: SHIPPED | OUTPATIENT
Start: 2020-10-20 | End: 2021-04-21

## 2020-12-04 DIAGNOSIS — E78.2 MIXED HYPERLIPIDEMIA: ICD-10-CM

## 2020-12-04 DIAGNOSIS — I70.90 ARTERIOSCLEROTIC VASCULAR DISEASE: ICD-10-CM

## 2020-12-04 RX ORDER — ROSUVASTATIN CALCIUM 40 MG/1
40 TABLET, COATED ORAL DAILY
Qty: 90 TABLET | Refills: 2 | Status: SHIPPED | OUTPATIENT
Start: 2020-12-04 | End: 2021-12-28 | Stop reason: SDUPTHER

## 2021-01-14 ENCOUNTER — TELEPHONE (OUTPATIENT)
Dept: INTERNAL MEDICINE | Facility: CLINIC | Age: 66
End: 2021-01-14

## 2021-01-14 NOTE — TELEPHONE ENCOUNTER
I would like pt to check pressure after 5 minutes quiet rest, well hydrated with water. If elevated recheck after 10 more minutes rest/ deep breathing.  If remains elevated, may take one additional losartan 50mg pill. If elevation persists in subsequent checks, then needs to be seen in office.

## 2021-01-14 NOTE — TELEPHONE ENCOUNTER
PATIENT STATES HER BLOOD PRESSURE HAS BEEN UP SINCE YESTERDAY 150/90 . SHE WANTS TO KNOW IF SHE CAN TAKE AN EXTRA LASARTIN BP PILL?      PLEASE ADVISE  [phone]

## 2021-02-02 ENCOUNTER — OFFICE VISIT (OUTPATIENT)
Dept: INTERNAL MEDICINE | Facility: CLINIC | Age: 66
End: 2021-02-02

## 2021-02-02 VITALS
BODY MASS INDEX: 27.85 KG/M2 | HEIGHT: 63 IN | TEMPERATURE: 97.1 F | RESPIRATION RATE: 18 BRPM | DIASTOLIC BLOOD PRESSURE: 64 MMHG | HEART RATE: 66 BPM | WEIGHT: 157.2 LBS | OXYGEN SATURATION: 99 % | SYSTOLIC BLOOD PRESSURE: 120 MMHG

## 2021-02-02 DIAGNOSIS — J06.9 UPPER RESPIRATORY TRACT INFECTION, UNSPECIFIED TYPE: Primary | ICD-10-CM

## 2021-02-02 LAB
EXPIRATION DATE: NORMAL
FLUAV AG NPH QL: NEGATIVE
FLUBV AG NPH QL: NEGATIVE
INTERNAL CONTROL: NORMAL
Lab: NORMAL

## 2021-02-02 PROCEDURE — 87804 INFLUENZA ASSAY W/OPTIC: CPT | Performed by: INTERNAL MEDICINE

## 2021-02-02 PROCEDURE — 99213 OFFICE O/P EST LOW 20 MIN: CPT | Performed by: INTERNAL MEDICINE

## 2021-02-02 NOTE — PROGRESS NOTES
Subjective     Bernice Dunlap is a 65 y.o. female who presents with   Chief Complaint   Patient presents with   • Nasal Congestion     runny nose  no fever  symptoms for 6 days   • Headache       History of Present Illness     Symptoms started on Thursday.  She started with a headache.  She has head and sinus congestion.  No sinus pain.  Sore throat.  Cough which is dry.  SOA is baseline.  No fever.  No chills.  She was achy.  Day prior she was cleaning with bleach but no immediate reaction.      Review of Systems   Constitutional: Negative for fever.   Respiratory: Negative.    Cardiovascular: Negative.    Musculoskeletal: Positive for arthralgias.       The following portions of the patient's history were reviewed and updated as appropriate: allergies, current medications and problem list.    Patient Active Problem List    Diagnosis Date Noted   • Screen for colon cancer 06/08/2020     Note Last Updated: 6/8/2020     Added automatically from request for surgery [id number]     • Essential hypertension 10/15/2019   • Coronary artery disease involving native coronary artery of native heart without angina pectoris 05/29/2019     Note Last Updated: 3/2/2020     s/p CABG  X 1 in 2008 and (-) stress in 2016 and 11/2018  10/2019 mid RCA lesion s/p PTCA x 1, started on Brillinta     • Osteopenia of multiple sites 02/15/2019     Note Last Updated: 2/15/2019     On DEXA 2/2019     • Nephrolithiasis 08/15/2018     Note Last Updated: 3/2/2020     Suspected, 7/2018; CT negative in ED by hx compatible  Retained stone in L kidney on 7/2018 CT and 2/2019 CT (5mm)     • Gastric reflux 01/22/2018     Note Last Updated: 1/22/2018     New issue 2017, meal time triggers     • Internal bleeding hemorrhoids 08/08/2017   • Bilateral carotid artery stenosis      Note Last Updated: 7/14/2016     1-15% B 8/2013 U/S --> 16-49% bilaterally in 2015     • S/P CABG x 1 04/18/2016   • Arteriosclerotic vascular disease 03/11/2016     Note Last  "Updated: 3/2/2020     Description: MI 2005; s/p CABG x 1 2008       • Bladder prolapse, female, acquired 03/11/2016   • Fibromyalgia 03/11/2016   • Hyperlipidemia 03/11/2016   • Displacement of lumbar intervertebral disc without myelopathy 03/11/2016   • Migraine 03/11/2016   • Pilar cyst 03/11/2016   • 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 Daily           Current Outpatient Medications on File Prior to Visit   Medication Sig Dispense Refill   • amLODIPine (NORVASC) 5 MG tablet Take 5 mg by mouth Daily.     • Cholecalciferol (HM VITAMIN D3) 2000 units capsule Take 2,000 Units by mouth Daily. 30 each    • clopidogrel (Plavix) 75 MG tablet Take 1 tablet by mouth Daily. 30 tablet 5   • losartan (COZAAR) 50 MG tablet Take 1 tablet by mouth Daily. 30 tablet 5   • metoprolol succinate XL (TOPROL-XL) 50 MG 24 hr tablet TAKE 1 TABLET BY MOUTH DAILY 90 tablet 2   • omeprazole (priLOSEC) 40 MG capsule TAKE ONE CAPSULE BY MOUTH EVERY DAY 30 capsule 3   • psyllium (METAMUCIL) 58.6 % powder Take  by mouth Daily.  12   • rosuvastatin (CRESTOR) 40 MG tablet Take 1 tablet by mouth Daily. 90 tablet 2   • traZODone (DESYREL) 50 MG tablet TAKE 1 TABLET BY MOUTH EVERY NIGHT AT BEDTIME 30 tablet 0     No current facility-administered medications on file prior to visit.        Objective     /64 (BP Location: Left arm, Patient Position: Sitting, Cuff Size: Large Adult)   Pulse 66   Temp 97.1 °F (36.2 °C)   Resp 18   Ht 160 cm (63\")   Wt 71.3 kg (157 lb 3.2 oz)   SpO2 99%   BMI 27.85 kg/m²     Physical Exam  Constitutional:       Appearance: She is well-developed.   HENT:      Head: Normocephalic and atraumatic.      Right Ear: Hearing and tympanic membrane normal.      Left Ear: Hearing and tympanic membrane normal. "      Mouth/Throat:      Pharynx: No oropharyngeal exudate or posterior oropharyngeal erythema.   Cardiovascular:      Rate and Rhythm: Normal rate and regular rhythm.      Heart sounds: Normal heart sounds.   Pulmonary:      Effort: Pulmonary effort is normal.      Breath sounds: Normal breath sounds.   Skin:     General: Skin is warm and dry.   Neurological:      Mental Status: She is alert and oriented to person, place, and time.   Psychiatric:         Behavior: Behavior normal.         Assessment/Plan   Diagnoses and all orders for this visit:    1. Upper respiratory tract infection, unspecified type (Primary)  -     POC Influenza A / B  -     COVID-19,[facility] ROUTINE, NP/OP SWAB IN TRANSPORT MEDIA OR ESWAB 72 HR TAT - Swab, Nasopharynx        Discussion    Patient presents with a URI.  She is negative for flu.  Symptoms very likely could be COVID-19.  Swab is ordered as well.         Future Appointments   Date Time Provider Department Center   2/2/2021  8:30 AM Griselda Jacobo MD [facility]   3/10/2021  8:50 AM [facility]   3/15/2021 11:15 AM Bryce Morales MD [facility]   10/20/2021  8:20 AM Rashad Contreras MD [facility] None

## 2021-02-03 LAB — SARS-COV-2 RNA RESP QL NAA+PROBE: NOT DETECTED

## 2021-02-05 ENCOUNTER — HOSPITAL ENCOUNTER (OUTPATIENT)
Dept: GENERAL RADIOLOGY | Facility: HOSPITAL | Age: 66
Discharge: HOME | End: 2021-02-05
Admitting: INTERNAL MEDICINE

## 2021-02-05 ENCOUNTER — OFFICE VISIT (OUTPATIENT)
Dept: INTERNAL MEDICINE | Facility: CLINIC | Age: 66
End: 2021-02-05

## 2021-02-05 VITALS
TEMPERATURE: 96.9 F | WEIGHT: 157 LBS | HEIGHT: 63 IN | DIASTOLIC BLOOD PRESSURE: 60 MMHG | SYSTOLIC BLOOD PRESSURE: 122 MMHG | HEART RATE: 85 BPM | OXYGEN SATURATION: 97 % | BODY MASS INDEX: 27.82 KG/M2

## 2021-02-05 DIAGNOSIS — J01.90 ACUTE SINUSITIS, RECURRENCE NOT SPECIFIED, UNSPECIFIED LOCATION: ICD-10-CM

## 2021-02-05 DIAGNOSIS — J20.9 ACUTE BRONCHITIS, UNSPECIFIED ORGANISM: ICD-10-CM

## 2021-02-05 DIAGNOSIS — R05.9 COUGH: ICD-10-CM

## 2021-02-05 DIAGNOSIS — J20.9 ACUTE BRONCHITIS, UNSPECIFIED ORGANISM: Primary | ICD-10-CM

## 2021-02-05 PROCEDURE — 71046 X-RAY EXAM CHEST 2 VIEWS: CPT

## 2021-02-05 PROCEDURE — 99214 OFFICE O/P EST MOD 30 MIN: CPT | Performed by: INTERNAL MEDICINE

## 2021-02-05 RX ORDER — SOD CHLOR,BICARB/SQUEEZ BOTTLE
1 PACKET, WITH RINSE DEVICE NASAL 3 TIMES DAILY
Qty: 1 EACH | Refills: 0
Start: 2021-02-05 | End: 2021-07-12

## 2021-02-05 RX ORDER — ACETAMINOPHEN 500 MG
TABLET ORAL
Qty: 30 TABLET | Refills: 0
Start: 2021-02-05 | End: 2021-12-02

## 2021-02-05 RX ORDER — AZITHROMYCIN 250 MG/1
TABLET, FILM COATED ORAL
Qty: 6 TABLET | Refills: 0 | Status: SHIPPED | OUTPATIENT
Start: 2021-02-05 | End: 2021-07-12

## 2021-02-05 RX ORDER — GUAIFENESIN AND DEXTROMETHORPHAN HYDROBROMIDE 1200; 60 MG/1; MG/1
TABLET, EXTENDED RELEASE ORAL
Qty: 28 EACH | Refills: 0
Start: 2021-02-05 | End: 2021-07-12

## 2021-02-05 RX ORDER — PROMETHAZINE HYDROCHLORIDE AND CODEINE PHOSPHATE 6.25; 1 MG/5ML; MG/5ML
5 SYRUP ORAL NIGHTLY PRN
Qty: 118 ML | Refills: 0 | Status: SHIPPED | OUTPATIENT
Start: 2021-02-05 | End: 2021-07-12

## 2021-02-05 NOTE — PROGRESS NOTES
"Cough (head congestion,SOA, )      HPI  Bernice Dunlap is a 65 y.o. female RTC in acute care:   \"I just dont feel good, doc'. Started 9 days ago with headache, sore throat, and runny nose and 'blah, I just dont feel good'. In bed 3-4 days, now better from that initial state.  Cough came a few days later and is still there. Is bothersome at night and coughed so much 'belly button hurts'.  No fevers.   No chills.  Did have some sweating this AM when woke up.  No more SOA than usual.  Cough is still there.  HA is gone now.   Had COVID and flu test 3 days ago in office, negative.   Meds: Coricidin and [place] and Aleve.  Does not feel like is helping.    Using vaporizer.   Sick contacts: Coworkers but no one is sick. All are masked.  Lives alone.  No known COVID contacts.     Review of Systems   Constitution: Positive for malaise/fatigue. Negative for chills and fever.   HENT: Positive for congestion, ear pain (on R, yesterday, resolved), hoarse voice and sore throat.    Eyes: Negative for discharge and pain.   Respiratory: Positive for cough, shortness of breath (baseline, no progression) and sputum production. Negative for wheezing.    Musculoskeletal: Negative for myalgias.   Gastrointestinal: Negative for diarrhea, nausea and vomiting.   Neurological: Positive for headaches (resolved today).       The following portions of the patient's history were reviewed and updated as appropriate: allergies, current medications, past medical history, past social history and problem list.      Current Outpatient Medications:   •  amLODIPine (NORVASC) 5 MG tablet, Take 5 mg by mouth Daily., Disp: , Rfl:   •  Cholecalciferol (HM VITAMIN D3) 2000 units capsule, Take 2,000 Units by mouth Daily., Disp: 30 each, Rfl:   •  clopidogrel (Plavix) 75 MG tablet, Take 1 tablet by mouth Daily., Disp: 30 tablet, Rfl: 5  •  losartan (COZAAR) 50 MG tablet, Take 1 tablet by mouth Daily., Disp: 30 tablet, Rfl: 5  •  metoprolol succinate XL " "(TOPROL-XL) 50 MG 24 hr tablet, TAKE 1 TABLET BY MOUTH DAILY, Disp: 90 tablet, Rfl: 2  •  omeprazole (priLOSEC) 40 MG capsule, TAKE ONE CAPSULE BY MOUTH EVERY DAY, Disp: 30 capsule, Rfl: 3  •  psyllium (METAMUCIL) 58.6 % powder, Take  by mouth Daily., Disp: , Rfl: 12  •  rosuvastatin (CRESTOR) 40 MG tablet, Take 1 tablet by mouth Daily., Disp: 90 tablet, Rfl: 2  •  traZODone (DESYREL) 50 MG tablet, TAKE 1 TABLET BY MOUTH EVERY NIGHT AT BEDTIME, Disp: 30 tablet, Rfl: 0  •  acetaminophen (TYLENOL) 500 MG tablet, 2 tabs PO Q 8 hours PRN, Disp: 30 tablet, Rfl: 0  •  azithromycin (Zithromax Z-Cayden) 250 MG tablet, Take 2 tablets the first day, then 1 tablet daily for 4 days., Disp: 6 tablet, Rfl: 0  •  Chlorphen-PE-Acetaminophen (Coricidin D Cold/Flu/Sinus) 2-5-325 MG tablet, Take 1 tablet by mouth 2 (Two) Times a Day., Disp: , Rfl:   •  Dextromethorphan-guaiFENesin (Mucinex DM Maximum Strength)  MG tablet sustained-release 12 hour, One PO BID, Disp: 28 each, Rfl: 0  •  Hypertonic Nasal Wash (Sinus Rinse Bottle Kit) pack, 1 bottle into the nostril(s) as directed by provider 3 (Three) Times a Day., Disp: 1 each, Rfl: 0  •  promethazine-codeine (PHENERGAN with CODEINE) 6.25-10 MG/5ML syrup, Take 5 mL by mouth At Night As Needed for Cough., Disp: 118 mL, Rfl: 0    Vitals:    02/05/21 1538   BP: 122/60   Pulse: 85   Temp: 96.9 °F (36.1 °C)   SpO2: 97%   Weight: 71.2 kg (157 lb)   Height: 160 cm (63\")     Body mass index is 27.81 kg/m².      Physical Exam  Vitals signs reviewed.   Constitutional:       General: She is not in acute distress.     Appearance: She is well-developed. She is not ill-appearing or toxic-appearing.   HENT:      Head: Normocephalic and atraumatic.      Right Ear: Tympanic membrane, ear canal and external ear normal.      Left Ear: Tympanic membrane, ear canal and external ear normal.      Nose:      Right Sinus: No maxillary sinus tenderness or frontal sinus tenderness.      Left Sinus: No " maxillary sinus tenderness or frontal sinus tenderness.      Mouth/Throat:      Mouth: Mucous membranes are not pale, not dry and not cyanotic. No oral lesions.      Pharynx: Uvula midline. No pharyngeal swelling, oropharyngeal exudate, posterior oropharyngeal erythema or uvula swelling.   Eyes:      General:         Right eye: No discharge.         Left eye: No discharge.      Conjunctiva/sclera: Conjunctivae normal.      Pupils: Pupils are equal, round, and reactive to light.   Neck:      Musculoskeletal: Normal range of motion and neck supple.   Cardiovascular:      Rate and Rhythm: Normal rate and regular rhythm.      Heart sounds: Normal heart sounds.   Pulmonary:      Effort: Pulmonary effort is normal. No respiratory distress.      Breath sounds: Normal breath sounds. No wheezing, rhonchi or rales.   Chest:      Chest wall: There is no dullness to percussion.      Comments: No egophony bilateral    Lymphadenopathy:      Cervical: No cervical adenopathy.   Neurological:      Mental Status: She is alert and oriented to person, place, and time.      Cranial Nerves: No cranial nerve deficit.   Psychiatric:         Mood and Affect: Mood normal.         Behavior: Behavior normal.         Assessment/ Plan  Diagnoses and all orders for this visit:    Acute bronchitis, unspecified organism  -     XR Chest PA & Lateral; Future  -     Dextromethorphan-guaiFENesin (Mucinex DM Maximum Strength)  MG tablet sustained-release 12 hour; One PO BID  -     promethazine-codeine (PHENERGAN with CODEINE) 6.25-10 MG/5ML syrup; Take 5 mL by mouth At Night As Needed for Cough.  -     Hypertonic Nasal Wash (Sinus Rinse Bottle Kit) pack; 1 bottle into the nostril(s) as directed by provider 3 (Three) Times a Day.  -     acetaminophen (TYLENOL) 500 MG tablet; 2 tabs PO Q 8 hours PRN  -     azithromycin (Zithromax Z-Cayden) 250 MG tablet; Take 2 tablets the first day, then 1 tablet daily for 4 days.  -     Chlorphen-PE-Acetaminophen  (Coricidin D Cold/Flu/Sinus) 2-5-325 MG tablet; Take 1 tablet by mouth 2 (Two) Times a Day.    Acute sinusitis, recurrence not specified, unspecified location  -     Dextromethorphan-guaiFENesin (Mucinex DM Maximum Strength)  MG tablet sustained-release 12 hour; One PO BID  -     promethazine-codeine (PHENERGAN with CODEINE) 6.25-10 MG/5ML syrup; Take 5 mL by mouth At Night As Needed for Cough.  -     Hypertonic Nasal Wash (Sinus Rinse Bottle Kit) pack; 1 bottle into the nostril(s) as directed by provider 3 (Three) Times a Day.  -     acetaminophen (TYLENOL) 500 MG tablet; 2 tabs PO Q 8 hours PRN  -     azithromycin (Zithromax Z-Cayden) 250 MG tablet; Take 2 tablets the first day, then 1 tablet daily for 4 days.  -     Chlorphen-PE-Acetaminophen (Coricidin D Cold/Flu/Sinus) 2-5-325 MG tablet; Take 1 tablet by mouth 2 (Two) Times a Day.    Cough  -     XR Chest PA & Lateral; Future  -     promethazine-codeine (PHENERGAN with CODEINE) 6.25-10 MG/5ML syrup; Take 5 mL by mouth At Night As Needed for Cough.        Return for Next scheduled follow up.      Discussion:  Bernice Dunlap is a 65 y.o. female RTC in acute care (new issue to examiner) with ~8 days of acute bronchitis and sinusitis sx.  COVID and Flu negative on 2/2/21 in office.  Exam today is largely benign, noting does have occasional loose cough.  Reassured pt suspect viral etiology, conservative care as above.  CXR today with noted tobacco hx and will f/u with pt once results back. Abx Rx to hold for now and start only if CXR abnormal or not better at 10-14 day tiffany. Pt to call or RTC if T> 100.5, progressive SOA, double sickness, or failure to improve.

## 2021-03-03 DIAGNOSIS — Z00.00 HEALTHCARE MAINTENANCE: Primary | ICD-10-CM

## 2021-03-03 DIAGNOSIS — I10 ESSENTIAL HYPERTENSION: ICD-10-CM

## 2021-03-03 DIAGNOSIS — E78.2 MIXED HYPERLIPIDEMIA: ICD-10-CM

## 2021-03-03 DIAGNOSIS — E55.9 VITAMIN D DEFICIENCY: ICD-10-CM

## 2021-03-03 DIAGNOSIS — R73.03 PRE-DIABETES: ICD-10-CM

## 2021-03-08 RX ORDER — CLOPIDOGREL BISULFATE 75 MG/1
75 TABLET ORAL DAILY
Qty: 30 TABLET | Refills: 5 | Status: SHIPPED | OUTPATIENT
Start: 2021-03-08 | End: 2021-09-30

## 2021-03-16 ENCOUNTER — BULK ORDERING (OUTPATIENT)
Dept: CASE MANAGEMENT | Facility: OTHER | Age: 66
End: 2021-03-16

## 2021-03-16 DIAGNOSIS — Z23 IMMUNIZATION DUE: ICD-10-CM

## 2021-03-22 DIAGNOSIS — I10 BENIGN ESSENTIAL HYPERTENSION: ICD-10-CM

## 2021-03-22 RX ORDER — METOPROLOL SUCCINATE 50 MG/1
50 TABLET, EXTENDED RELEASE ORAL DAILY
Qty: 90 TABLET | Refills: 2 | Status: SHIPPED | OUTPATIENT
Start: 2021-03-22 | End: 2022-01-10 | Stop reason: SDUPTHER

## 2021-03-29 RX ORDER — AMLODIPINE BESYLATE 5 MG/1
5 TABLET ORAL DAILY
Qty: 90 TABLET | Refills: 3 | Status: SHIPPED | OUTPATIENT
Start: 2021-03-29 | End: 2022-01-10 | Stop reason: SDUPTHER

## 2021-04-21 RX ORDER — LOSARTAN POTASSIUM 50 MG/1
50 TABLET ORAL DAILY
Qty: 30 TABLET | Refills: 5 | Status: SHIPPED | OUTPATIENT
Start: 2021-04-21 | End: 2021-11-01

## 2021-07-12 ENCOUNTER — OFFICE VISIT (OUTPATIENT)
Dept: INTERNAL MEDICINE | Facility: CLINIC | Age: 66
End: 2021-07-12

## 2021-07-12 VITALS
OXYGEN SATURATION: 100 % | RESPIRATION RATE: 16 BRPM | SYSTOLIC BLOOD PRESSURE: 124 MMHG | DIASTOLIC BLOOD PRESSURE: 76 MMHG | TEMPERATURE: 97.1 F | BODY MASS INDEX: 25.87 KG/M2 | HEIGHT: 63 IN | HEART RATE: 70 BPM | WEIGHT: 146 LBS

## 2021-07-12 DIAGNOSIS — N20.0 NEPHROLITHIASIS: ICD-10-CM

## 2021-07-12 DIAGNOSIS — Z12.31 ENCOUNTER FOR SCREENING MAMMOGRAM FOR MALIGNANT NEOPLASM OF BREAST: ICD-10-CM

## 2021-07-12 DIAGNOSIS — M79.7 FIBROMYALGIA: ICD-10-CM

## 2021-07-12 DIAGNOSIS — K21.9 GASTRIC REFLUX: ICD-10-CM

## 2021-07-12 DIAGNOSIS — I65.23 BILATERAL CAROTID ARTERY STENOSIS: ICD-10-CM

## 2021-07-12 DIAGNOSIS — M85.89 OSTEOPENIA OF MULTIPLE SITES: ICD-10-CM

## 2021-07-12 DIAGNOSIS — I10 ESSENTIAL HYPERTENSION: ICD-10-CM

## 2021-07-12 DIAGNOSIS — Z23 NEED FOR VACCINATION AGAINST STREPTOCOCCUS PNEUMONIAE USING PNEUMOCOCCAL CONJUGATE VACCINE 13: ICD-10-CM

## 2021-07-12 DIAGNOSIS — E78.2 MIXED HYPERLIPIDEMIA: ICD-10-CM

## 2021-07-12 DIAGNOSIS — I25.10 CORONARY ARTERY DISEASE INVOLVING NATIVE CORONARY ARTERY OF NATIVE HEART WITHOUT ANGINA PECTORIS: ICD-10-CM

## 2021-07-12 DIAGNOSIS — E55.9 VITAMIN D DEFICIENCY: ICD-10-CM

## 2021-07-12 DIAGNOSIS — Z95.1 S/P CABG X 1: ICD-10-CM

## 2021-07-12 DIAGNOSIS — L30.1 VESICULAR PALMOPLANTAR ECZEMA: ICD-10-CM

## 2021-07-12 DIAGNOSIS — Z00.00 HEALTHCARE MAINTENANCE: Primary | ICD-10-CM

## 2021-07-12 DIAGNOSIS — I70.90 ARTERIOSCLEROTIC VASCULAR DISEASE: ICD-10-CM

## 2021-07-12 DIAGNOSIS — G43.009 MIGRAINE WITHOUT AURA AND WITHOUT STATUS MIGRAINOSUS, NOT INTRACTABLE: ICD-10-CM

## 2021-07-12 DIAGNOSIS — R73.03 PRE-DIABETES: ICD-10-CM

## 2021-07-12 PROBLEM — Z12.11 SCREEN FOR COLON CANCER: Status: RESOLVED | Noted: 2020-06-08 | Resolved: 2021-07-12

## 2021-07-12 PROBLEM — Z12.11 SCREEN FOR COLON CANCER: Status: ACTIVE | Noted: 2020-06-08

## 2021-07-12 PROCEDURE — 90670 PCV13 VACCINE IM: CPT | Performed by: INTERNAL MEDICINE

## 2021-07-12 PROCEDURE — 96160 PT-FOCUSED HLTH RISK ASSMT: CPT | Performed by: INTERNAL MEDICINE

## 2021-07-12 PROCEDURE — G0009 ADMIN PNEUMOCOCCAL VACCINE: HCPCS | Performed by: INTERNAL MEDICINE

## 2021-07-12 PROCEDURE — 1159F MED LIST DOCD IN RCRD: CPT | Performed by: INTERNAL MEDICINE

## 2021-07-12 PROCEDURE — G0438 PPPS, INITIAL VISIT: HCPCS | Performed by: INTERNAL MEDICINE

## 2021-07-12 PROCEDURE — 99397 PER PM REEVAL EST PAT 65+ YR: CPT | Performed by: INTERNAL MEDICINE

## 2021-07-12 NOTE — PROGRESS NOTES
"Subjective     Chief Complaint   Patient presents with   • Annual Exam     AWV and exam, review of medical issues       HPI:  Bernice Dunlap is a 66 y.o. female  RTC in yearly CPE/ AWV, review of medical issues:  Has been doing well. 'I feel pretty good'.   1. ASCVD s/p CABG in 2008 and (-) stress in 2016 and 11/2018; inpt admit 10/2019 with (+) stress test and new mid RCA lesion  S/p PTCA x 1 - no issues, no angina sx.  Saw APRN. Breathing well.    2. HTN - on one drug. Checks 1-2x/ week.  Numbers have been good.   3. Carotid Artery Stenosis, 16-49% B on 1/2018 U/S; 1-15% --> 16-49% B 1/2018 - asx'ic. No TIA sx.   4. Pre-DM -  \"I have been on a diet\".  No bread, no soft drinks, no sugary foods. Eating 3 meals/ day and eating 'more fruit than I have ever eaten in my life'. Has lost some weight. No exercise right now, 'it is too hot'.     5. Vitamin D deficiency - on 2000 I.U. Daily at this time, down from 4000 I.U. daily.   6. Nephrolithiasis - new issue 7/2018, seen in ED at [facility]. Remains with retained 5mm stone on CT 2/2019 on L. Not sure if has had recurrence. \"I can never tell\".  Has some fleeting, intermittent pains at times.  No persistent pain.  No blood in urine.    7. HLD - on high dose Crestor.   8. Fibromyalgia - long term controlled on Lyrica, now off and doing well.  Vision OK off med.    9. Migraine HA - CIARA over year.   10. Vitamin D deficiency - not replete on 2000 I.U. Daily at this time. Increase to 4000 I.U. Daily, trend next labs.   11. Gastric Reflux - off PPI daily and carafate. No issues as has come off sugary foods and sodas. Swallowing OK.   12. HM - not had Mammo; C-scope 2010 (-) --> 10 years, did not complete as was confused on scheduleing    The following portions of the patient's history were reviewed and updated as appropriate: allergies, current medications, past family history, past medical history, past social history, past surgical history and problem list.    Review of Systems " "  Constitutional: Positive for weight loss (intentional). Negative for chills, fever and malaise/fatigue.   HENT: Negative for congestion, hearing loss, odynophagia and sore throat.    Eyes: Negative for discharge, double vision, pain and redness.        Last optho appt 2020, new glasses, no other issues.      Cardiovascular: Negative for chest pain, dyspnea on exertion, irregular heartbeat, near-syncope, palpitations and syncope.   Respiratory: Negative for cough and shortness of breath.    Endocrine: Negative for polydipsia, polyphagia and polyuria.   Hematologic/Lymphatic: Negative for bleeding problem. Does not bruise/bleed easily.   Skin: Negative for rash and suspicious lesions.   Musculoskeletal: Negative for joint pain, joint swelling, muscle cramps, muscle weakness and myalgias.   Gastrointestinal: Negative for constipation, diarrhea, dysphagia, heartburn (resolved with diet mods), nausea and vomiting.   Genitourinary: Negative for dysuria, frequency, hematuria, hesitancy and incomplete emptying.   Neurological: Negative for dizziness, headaches and light-headedness.   Psychiatric/Behavioral: Negative for depression. The patient does not have insomnia and is not nervous/anxious.    Allergic/Immunologic: Negative for environmental allergies and persistent infections.       Patient Care Team:  Bryce Morales MD as PCP - General  Bryce Morales MD as PCP - Family Medicine    Recent Hospitalizations: No recent hospitalization(s).    Depression Screen:   No flowsheet data found.    Functional and Cognitive Screening:  No flowsheet data found.    Does the patient have evidence of cognitive impairment? no    Does not need ASA (and currently is not on it)    Vitals:    07/12/21 1109   BP: 124/76   Pulse: 70   Resp: 16   Temp: 97.1 °F (36.2 °C)   TempSrc: Infrared   SpO2: 100%   Weight: 66.2 kg (146 lb)   Height: 160 cm (62.99\")       Body mass index is 25.87 kg/m².    Visual Acuity:  No exam data " present    Advanced Care Planning:  ACP discussion was held with the patient during this visit. Patient has an advance directive (not in EMR), copy requested.    Objective   Physical Exam  Vitals reviewed. Exam conducted with a chaperone present.   Constitutional:       General: She is not in acute distress.     Appearance: Normal appearance. She is well-developed. She is not ill-appearing or toxic-appearing.   HENT:      Head: Normocephalic and atraumatic.      Right Ear: Hearing, tympanic membrane, ear canal and external ear normal. There is no impacted cerumen.      Left Ear: Hearing, tympanic membrane, ear canal and external ear normal. There is no impacted cerumen.      Nose: Nose normal.      Mouth/Throat:      Mouth: Mucous membranes are moist.      Pharynx: Oropharynx is clear. Uvula midline. No oropharyngeal exudate.      Comments: Large kay noted on hard palate B    Eyes:      General: Lids are normal. No scleral icterus.     Extraocular Movements: Extraocular movements intact.      Conjunctiva/sclera: Conjunctivae normal.      Pupils: Pupils are equal, round, and reactive to light.   Neck:      Thyroid: No thyroid mass or thyromegaly.      Vascular: No carotid bruit.      Trachea: Trachea normal.   Cardiovascular:      Rate and Rhythm: Normal rate and regular rhythm.      Pulses:           Radial pulses are 2+ on the right side and 2+ on the left side.        Dorsalis pedis pulses are 2+ on the right side and 2+ on the left side.        Posterior tibial pulses are 2+ on the right side and 2+ on the left side.      Heart sounds: Normal heart sounds, S1 normal and S2 normal. No murmur heard.   No friction rub. No gallop.    Pulmonary:      Effort: Pulmonary effort is normal. No respiratory distress.      Breath sounds: Normal breath sounds. No wheezing, rhonchi or rales.   Chest:      Chest wall: No mass.      Breasts:         Right: No inverted nipple, mass, nipple discharge or skin change.         Left:  No inverted nipple, mass, nipple discharge or skin change.   Abdominal:      General: Bowel sounds are normal. There is no distension.      Palpations: Abdomen is soft. There is no mass.      Tenderness: There is no abdominal tenderness. There is no guarding or rebound.   Musculoskeletal:         General: Normal range of motion.      Cervical back: Full passive range of motion without pain, normal range of motion and neck supple.      Right lower leg: No edema.      Left lower leg: No edema.      Right foot: Normal range of motion. No deformity or bunion.      Left foot: Normal range of motion. No deformity or bunion.   Feet:      Right foot:      Skin integrity: No ulcer, blister or skin breakdown.      Left foot:      Skin integrity: No ulcer, blister or skin breakdown.   Lymphadenopathy:      Cervical: No cervical adenopathy.      Right cervical: No superficial, deep or posterior cervical adenopathy.     Left cervical: No superficial, deep or posterior cervical adenopathy.      Upper Body:      Right upper body: No supraclavicular, axillary or pectoral adenopathy.      Left upper body: No supraclavicular, axillary or pectoral adenopathy.      Lower Body: No right inguinal adenopathy. No left inguinal adenopathy.   Skin:     General: Skin is warm and dry.      Findings: No rash.   Neurological:      Mental Status: She is alert and oriented to person, place, and time.      Cranial Nerves: No cranial nerve deficit.      Sensory: No sensory deficit.      Motor: No weakness, tremor, atrophy or abnormal muscle tone.      Gait: Gait normal.      Deep Tendon Reflexes: Reflexes are normal and symmetric.      Reflex Scores:       Patellar reflexes are 2+ on the right side and 2+ on the left side.       Achilles reflexes are 2+ on the right side and 2+ on the left side.  Psychiatric:         Mood and Affect: Mood normal.         Behavior: Behavior normal.         Thought Content: Thought content normal.         Compared to  one year ago, the patient feels physical health is the same.  Compared to one year ago, the patient feels mental health is the same.    Reviewed chart for potential of high risk medication in the elderly: yes  Reviewed chart for potential of harmful drug interactions in the elderly:yes    Identification of Risk Factors:  Risk factors include: Advance Directive Discussion  Breast Cancer/Mammogram Screening  Chronic Pain   Colon Cancer Screening  Immunizations Discussed/Encouraged (specific immunizations; Shingrix ).    Patient Self-Management and Personalized Health Advice  The patient has been provided with information about: diet, exercise and weight management and preventive services including:   · Annual Wellness Visit (AWV)  · Bone Density Measurements  · Colorectal Cancer Screening, Colonoscopy  · Screening Mammography .    Discussed the patient's BMI with her. The BMI is in the acceptable range.    Assessment/Plan   Diagnoses and all orders for this visit:    1. Healthcare maintenance (Primary)  -     Ambulatory Referral For Screening Colonoscopy  -     Mammo Screening Bilateral With CAD; Future  -     DEXA Bone Density Axial; Future    2. Arteriosclerotic vascular disease    3. Fibromyalgia    4. Mixed hyperlipidemia    5. Migraine without aura and without status migrainosus, not intractable    6. Vesicular palmoplantar eczema    7. Pre-diabetes    8. Vitamin D deficiency    9. S/P CABG x 1    10. Bilateral carotid artery stenosis    11. Gastric reflux    12. Nephrolithiasis    13. Osteopenia of multiple sites  -     DEXA Bone Density Axial; Future    14. Coronary artery disease involving native coronary artery of native heart without angina pectoris    15. Essential hypertension    16. Encounter for screening mammogram for malignant neoplasm of breast   -     Mammo Screening Bilateral With CAD; Future    17. Need for vaccination against Streptococcus pneumoniae using pneumococcal conjugate vaccine 13  -      Pneumococcal Conjugate Vaccine 13-Valent All            Diagnoses and all orders for this visit:    Healthcare maintenance  -     Ambulatory Referral For Screening Colonoscopy  -     Mammo Screening Bilateral With CAD; Future  -     DEXA Bone Density Axial; Future    Arteriosclerotic vascular disease    Fibromyalgia    Mixed hyperlipidemia    Migraine without aura and without status migrainosus, not intractable    Vesicular palmoplantar eczema    Pre-diabetes    Vitamin D deficiency    S/P CABG x 1    Bilateral carotid artery stenosis    Gastric reflux    Nephrolithiasis    Osteopenia of multiple sites  -     DEXA Bone Density Axial; Future    Coronary artery disease involving native coronary artery of native heart without angina pectoris    Essential hypertension    Encounter for screening mammogram for malignant neoplasm of breast   -     Mammo Screening Bilateral With CAD; Future    Need for vaccination against Streptococcus pneumoniae using pneumococcal conjugate vaccine 13  -     Pneumococcal Conjugate Vaccine 13-Valent All      Bernice Dunlap is a 66 y.o. female  RTC in yearly CPE/ AWV, review of medical issues:     1. ASCVD s/p CABG in 2008 and (-) stress in 2016 and 11/2018; inpt admit 10/2019 with (+) stress test and new mid RCA lesion  S/p PTCA x 1 - overall doing well, no angina sx.  Off Brilinta, on clopidogrel only. Statin, ARB, B-blocker, c/w same. F/U Cards as planned.   2. HTN - controlled on one drug. BMP pending.  3. Carotid Artery Stenosis, 16-49% B on 1/2018 U/S; 1-15% --> 16-49% B 1/2018 - asx'ic. Asx'ic.  C/W statin and clopidogrel.   4. Pre-DM -  A1C and FBS pending. Had normalized on labs with noted TLC diet mods and weight loss.  Await labs.  C/W efforts, congratulated pt.   5. Vitamin D deficiency - on 2000 I.U. Daily at this time, down from 4000 I.U. daily. Await labs.   6. Nephrolithiasis - new issue 7/2018, seen in ED at [place]. Remains with retained 5mm stone on CT 2/2019 on L. No  current sx. U/A pending.    7. HLD - on high dose Crestor. FLP pending.   8. Fibromyalgia - long term controlled on Lyrica, now off due to blurred vision and doing well. Restart exercise program.   10. Pomphylox eczema - resolved.      11. Migraine HA - CIARA over year.   12. Pilar Cyst - on scalp, declines plastic surgery eval. Deferred today.  13. Gastric Reflux - off PPI daily and carafate with diet mods/ weight loss.  No red flag sx. Trend.   14. HM - labs pending; Flu/Tdap / Pvax/ Hep A/ COVID - UTD; Prevnar today; Shingrix at pharmacy; C-scope 2010 (-) --> 10 years, refer again today; Mammo due, refer; Pap D/C'd s/p LEONORA; DEXA with mild osteopenia, low FRAX, repeat prior to f/u appt; Hep C Ab (-) 2014; add back exercise; Preventative care plan provided to pt at end of visit      Return in about 6 months (around 1/12/2022) for Recheck. (CMP, A1C, U/A)          Current Outpatient Medications:   •  acetaminophen (TYLENOL) 500 MG tablet, 2 tabs PO Q 8 hours PRN, Disp: 30 tablet, Rfl: 0  •  amLODIPine (NORVASC) 5 MG tablet, Take 1 tablet by mouth Daily., Disp: 90 tablet, Rfl: 3  •  Cholecalciferol ( VITAMIN D3) 2000 units capsule, Take 2,000 Units by mouth Daily., Disp: 30 each, Rfl:   •  clopidogrel (PLAVIX) 75 MG tablet, TAKE 1 TABLET BY MOUTH DAILY, Disp: 30 tablet, Rfl: 5  •  losartan (COZAAR) 50 MG tablet, TAKE 1 TABLET BY MOUTH DAILY, Disp: 30 tablet, Rfl: 5  •  metoprolol succinate XL (TOPROL-XL) 50 MG 24 hr tablet, Take 1 tablet by mouth Daily., Disp: 90 tablet, Rfl: 2  •  psyllium (METAMUCIL) 58.6 % powder, Take  by mouth Daily., Disp: , Rfl: 12  •  rosuvastatin (CRESTOR) 40 MG tablet, Take 1 tablet by mouth Daily., Disp: 90 tablet, Rfl: 2  •  traZODone (DESYREL) 50 MG tablet, TAKE 1 TABLET BY MOUTH EVERY NIGHT AT BEDTIME, Disp: 30 tablet, Rfl: 0

## 2021-07-12 NOTE — PATIENT INSTRUCTIONS
Shingrix (new shingles shot; 2 shot series) check at pharmacy      Medicare Wellness  Personal Prevention Plan of Service     Date of Office Visit:  2021  Encounter Provider:  Bryce Morales MD  Place of Service:  [facility]  Patient Name: Bernice Dunlap  :  1955    As part of the Medicare Wellness portion of your visit today, we are providing you with this personalized preventive plan of services (PPPS). This plan is based upon recommendations of the [place] Preventive Services Task Force (USPSTF) and the Advisory Committee on Immunization Practices (ACIP).    This lists the preventive care services that should be considered, and provides dates of when you are due. Items listed as completed are up-to-date and do not require any further intervention.    Health Maintenance   Topic Date Due   • ZOSTER VACCINE (1 of 2) Never done   • MAMMOGRAM  2017   • COLORECTAL CANCER SCREENING  2020   • DXA SCAN  2021   • LIPID PANEL  2021   • INFLUENZA VACCINE  2021   • TDAP/TD VACCINES (2 - Td or Tdap) 2022   • ANNUAL WELLNESS VISIT  2022   • Pneumococcal Vaccine 65+ (2 of 2 - PPSV23) 10/16/2024   • HEPATITIS C SCREENING  Completed   • COVID-19 Vaccine  Completed   • PAP SMEAR  Discontinued       Orders Placed This Encounter   Procedures   • Mammo Screening Bilateral With CAD     Standing Status:   Future     Standing Expiration Date:   2022     Order Specific Question:   Reason for Exam:     Answer:   Screening     Order Specific Question:   Release to patient     Answer:   Immediate   • DEXA Bone Density Axial     Standing Status:   Future     Standing Expiration Date:   2022     Order Specific Question:   Is patient taking or have taken long term Glucocorticoid (steroids)?     Answer:   No     Order Specific Question:   Does the patient have rheumatoid arthritis?     Answer:   No     Order Specific Question:   Reason for  Exam:     Answer:   Osteopenia f/u     Order Specific Question:   Does this patient have a diabetic monitoring/medication delivering device on?     Answer:   No     Order Specific Question:   Release to patient     Answer:   Immediate   • Pneumococcal Conjugate Vaccine 13-Valent All   • Ambulatory Referral For Screening Colonoscopy     Referral Priority:   Routine     Referral Type:   Diagnostic Medical     Referral Reason:   Specialty Services Required     Referred to Provider:   Bereket Silva MD     Number of Visits Requested:   1       Return in about 6 months (around 1/12/2022) for Recheck.

## 2021-07-13 ENCOUNTER — TRANSCRIBE ORDERS (OUTPATIENT)
Dept: ADMINISTRATIVE | Facility: HOSPITAL | Age: 66
End: 2021-07-13

## 2021-07-13 DIAGNOSIS — Z12.31 VISIT FOR SCREENING MAMMOGRAM: Primary | ICD-10-CM

## 2021-07-14 LAB
25(OH)D3+25(OH)D2 SERPL-MCNC: 30.2 NG/ML (ref 30–100)
ALBUMIN SERPL-MCNC: 4.6 G/DL (ref 3.5–5.2)
ALBUMIN/GLOB SERPL: 1.8 G/DL
ALP SERPL-CCNC: 68 U/L (ref 39–117)
ALT SERPL-CCNC: 13 U/L (ref 1–33)
APPEARANCE UR: CLEAR
AST SERPL-CCNC: 17 U/L (ref 1–32)
BACTERIA #/AREA URNS HPF: NORMAL /HPF
BACTERIA UR CULT: NORMAL
BACTERIA UR CULT: NORMAL
BASOPHILS # BLD AUTO: 0.02 10*3/MM3 (ref 0–0.2)
BASOPHILS NFR BLD AUTO: 0.5 % (ref 0–1.5)
BILIRUB SERPL-MCNC: 0.7 MG/DL (ref 0–1.2)
BILIRUB UR QL STRIP: NEGATIVE
BUN SERPL-MCNC: 9 MG/DL (ref 8–23)
BUN/CREAT SERPL: 14.5 (ref 7–25)
CALCIUM SERPL-MCNC: 9.7 MG/DL (ref 8.6–10.5)
CASTS URNS QL MICRO: NORMAL /LPF
CHLORIDE SERPL-SCNC: 105 MMOL/L (ref 98–107)
CHOLEST SERPL-MCNC: 129 MG/DL (ref 0–200)
CO2 SERPL-SCNC: 25.5 MMOL/L (ref 22–29)
COLOR UR: YELLOW
CREAT SERPL-MCNC: 0.62 MG/DL (ref 0.57–1)
EOSINOPHIL # BLD AUTO: 0.12 10*3/MM3 (ref 0–0.4)
EOSINOPHIL NFR BLD AUTO: 2.9 % (ref 0.3–6.2)
EPI CELLS #/AREA URNS HPF: NORMAL /HPF (ref 0–10)
ERYTHROCYTE [DISTWIDTH] IN BLOOD BY AUTOMATED COUNT: 12.8 % (ref 12.3–15.4)
GLOBULIN SER CALC-MCNC: 2.5 GM/DL
GLUCOSE SERPL-MCNC: 89 MG/DL (ref 65–99)
GLUCOSE UR QL: NEGATIVE
HBA1C MFR BLD: 5.5 % (ref 4.8–5.6)
HCT VFR BLD AUTO: 39.1 % (ref 34–46.6)
HDLC SERPL-MCNC: 48 MG/DL (ref 40–60)
HGB BLD-MCNC: 13.3 G/DL (ref 12–15.9)
HGB UR QL STRIP: NEGATIVE
IMM GRANULOCYTES # BLD AUTO: 0 10*3/MM3 (ref 0–0.05)
IMM GRANULOCYTES NFR BLD AUTO: 0 % (ref 0–0.5)
KETONES UR QL STRIP: NEGATIVE
LDLC SERPL CALC-MCNC: 68 MG/DL (ref 0–100)
LEUKOCYTE ESTERASE UR QL STRIP: ABNORMAL
LYMPHOCYTES # BLD AUTO: 2.05 10*3/MM3 (ref 0.7–3.1)
LYMPHOCYTES NFR BLD AUTO: 48.9 % (ref 19.6–45.3)
MCH RBC QN AUTO: 30.2 PG (ref 26.6–33)
MCHC RBC AUTO-ENTMCNC: 34 G/DL (ref 31.5–35.7)
MCV RBC AUTO: 88.7 FL (ref 79–97)
MICRO URNS: ABNORMAL
MONOCYTES # BLD AUTO: 0.38 10*3/MM3 (ref 0.1–0.9)
MONOCYTES NFR BLD AUTO: 9.1 % (ref 5–12)
NEUTROPHILS # BLD AUTO: 1.62 10*3/MM3 (ref 1.7–7)
NEUTROPHILS NFR BLD AUTO: 38.6 % (ref 42.7–76)
NITRITE UR QL STRIP: NEGATIVE
NRBC BLD AUTO-RTO: 0 /100 WBC (ref 0–0.2)
PH UR STRIP: 7 [PH] (ref 5–7.5)
PLATELET # BLD AUTO: 366 10*3/MM3 (ref 140–450)
POTASSIUM SERPL-SCNC: 4.5 MMOL/L (ref 3.5–5.2)
PROT SERPL-MCNC: 7.1 G/DL (ref 6–8.5)
PROT UR QL STRIP: NEGATIVE
RBC # BLD AUTO: 4.41 10*6/MM3 (ref 3.77–5.28)
RBC #/AREA URNS HPF: NORMAL /HPF (ref 0–2)
SODIUM SERPL-SCNC: 139 MMOL/L (ref 136–145)
SP GR UR: 1.02 (ref 1–1.03)
TRIGL SERPL-MCNC: 58 MG/DL (ref 0–150)
TSH SERPL DL<=0.005 MIU/L-ACNC: 0.94 UIU/ML (ref 0.27–4.2)
URINALYSIS REFLEX: ABNORMAL
UROBILINOGEN UR STRIP-MCNC: 1 MG/DL (ref 0.2–1)
VLDLC SERPL CALC-MCNC: 13 MG/DL (ref 5–40)
WBC # BLD AUTO: 4.19 10*3/MM3 (ref 3.4–10.8)
WBC #/AREA URNS HPF: NORMAL /HPF (ref 0–5)

## 2021-09-28 DIAGNOSIS — G47.09 OTHER INSOMNIA: ICD-10-CM

## 2021-09-28 RX ORDER — TRAZODONE HYDROCHLORIDE 50 MG/1
50 TABLET ORAL
Qty: 30 TABLET | Refills: 4 | Status: SHIPPED | OUTPATIENT
Start: 2021-09-28 | End: 2022-06-24 | Stop reason: SDUPTHER

## 2021-09-30 RX ORDER — CLOPIDOGREL BISULFATE 75 MG/1
75 TABLET ORAL DAILY
Qty: 30 TABLET | Refills: 5 | Status: SHIPPED | OUTPATIENT
Start: 2021-09-30 | End: 2022-04-04 | Stop reason: SDUPTHER

## 2021-11-01 RX ORDER — LOSARTAN POTASSIUM 50 MG/1
50 TABLET ORAL DAILY
Qty: 30 TABLET | Refills: 5 | Status: SHIPPED | OUTPATIENT
Start: 2021-11-01 | End: 2022-02-04

## 2021-12-02 ENCOUNTER — OFFICE VISIT (OUTPATIENT)
Dept: INTERNAL MEDICINE | Facility: CLINIC | Age: 66
End: 2021-12-02

## 2021-12-02 VITALS
OXYGEN SATURATION: 98 % | SYSTOLIC BLOOD PRESSURE: 122 MMHG | WEIGHT: 152 LBS | TEMPERATURE: 96.9 F | BODY MASS INDEX: 27.97 KG/M2 | HEART RATE: 75 BPM | DIASTOLIC BLOOD PRESSURE: 70 MMHG | HEIGHT: 62 IN

## 2021-12-02 DIAGNOSIS — J02.9 ACUTE PHARYNGITIS, UNSPECIFIED ETIOLOGY: ICD-10-CM

## 2021-12-02 DIAGNOSIS — R53.83 FATIGUE, UNSPECIFIED TYPE: ICD-10-CM

## 2021-12-02 DIAGNOSIS — J01.90 ACUTE SINUSITIS, RECURRENCE NOT SPECIFIED, UNSPECIFIED LOCATION: Primary | ICD-10-CM

## 2021-12-02 PROCEDURE — 99213 OFFICE O/P EST LOW 20 MIN: CPT | Performed by: INTERNAL MEDICINE

## 2021-12-02 RX ORDER — SOD CHLOR,BICARB/SQUEEZ BOTTLE
1 PACKET, WITH RINSE DEVICE NASAL 3 TIMES DAILY
Qty: 1 EACH | Refills: 0
Start: 2021-12-02 | End: 2023-01-12 | Stop reason: ALTCHOICE

## 2021-12-02 RX ORDER — IBUPROFEN 200 MG
TABLET ORAL
Qty: 30 TABLET | Refills: 0
Start: 2021-12-02 | End: 2023-02-23 | Stop reason: ALTCHOICE

## 2021-12-02 RX ORDER — FLUTICASONE PROPIONATE 50 MCG
2 SPRAY, SUSPENSION (ML) NASAL DAILY
Start: 2021-12-02 | End: 2023-01-12 | Stop reason: ALTCHOICE

## 2021-12-02 NOTE — PROGRESS NOTES
"nasal congestion (x 2 weeks )      HPI  Bernice Dunlap is a 66 y.o. female  RTC in acute care:  \"I feel crappy\".   Started 2 weeks ago with sore throat and fatigue.  Then started with HA. Sore throat is gone now.  Has had some off and on chills, but no fever.  Checked temp and was normal.  No cough.  No SOA.    Meds: Coricidin, Zyrtec, and Dayquil.  Using vaporizer and seems to help.  NO NSAID or Tylenol use.   No sick contacts.   No COVID contacts.   Had Vaccine, not had booster yet.   Not really had allergy issues for last few years.  None in last few years since moved here.     Review of Systems   Constitutional: Positive for chills and malaise/fatigue. Negative for decreased appetite and fever.   HENT: Positive for congestion (lot of draining; clear discharge. ). Negative for ear discharge, ear pain, hoarse voice and sore throat (resolved).    Cardiovascular: Negative for dyspnea on exertion.   Respiratory: Negative for cough and sputum production.    Gastrointestinal: Negative for diarrhea, nausea and vomiting.       The following portions of the patient's history were reviewed and updated as appropriate: allergies, current medications, past medical history, past social history and problem list.      Current Outpatient Medications:   •  amLODIPine (NORVASC) 5 MG tablet, Take 1 tablet by mouth Daily., Disp: 90 tablet, Rfl: 3  •  Cholecalciferol (HM VITAMIN D3) 2000 units capsule, Take 2,000 Units by mouth Daily., Disp: 30 each, Rfl:   •  clopidogrel (PLAVIX) 75 MG tablet, TAKE 1 TABLET BY MOUTH DAILY, Disp: 30 tablet, Rfl: 5  •  losartan (COZAAR) 50 MG tablet, TAKE 1 TABLET BY MOUTH DAILY, Disp: 30 tablet, Rfl: 5  •  metoprolol succinate XL (TOPROL-XL) 50 MG 24 hr tablet, Take 1 tablet by mouth Daily., Disp: 90 tablet, Rfl: 2  •  psyllium (METAMUCIL) 58.6 % powder, Take  by mouth Daily., Disp: , Rfl: 12  •  rosuvastatin (CRESTOR) 40 MG tablet, Take 1 tablet by mouth Daily., Disp: 90 tablet, Rfl: 2  •  " "traZODone (DESYREL) 50 MG tablet, Take 1 tablet by mouth every night at bedtime., Disp: 30 tablet, Rfl: 4  •  fluticasone (FLONASE) 50 MCG/ACT nasal spray, 2 sprays into the nostril(s) as directed by provider Daily for 30 days. Administer 2 sprays in each nostril daily, Disp: , Rfl:   •  Hypertonic Nasal Wash (Sinus Rinse Bottle Kit) pack, 1 bottle into the nostril(s) as directed by provider 3 (Three) Times a Day., Disp: 1 each, Rfl: 0  •  ibuprofen (Motrin IB) 200 MG tablet, 3 tabs PO q 8 hours, Disp: 30 tablet, Rfl: 0    Vitals:    12/02/21 1021   BP: 122/70   Pulse: 75   Temp: 96.9 °F (36.1 °C)   SpO2: 98%   Weight: 68.9 kg (152 lb)   Height: 157.5 cm (62\")     Body mass index is 27.8 kg/m².      Physical Exam  Constitutional:       General: She is not in acute distress.     Appearance: Normal appearance. She is normal weight. She is not ill-appearing or toxic-appearing.   HENT:      Head: Normocephalic and atraumatic.      Right Ear: Tympanic membrane, ear canal and external ear normal.      Left Ear: Tympanic membrane, ear canal and external ear normal.      Ears:      Comments: TM light refex dull B       Nose: No nasal tenderness.      Right Sinus: No maxillary sinus tenderness or frontal sinus tenderness.      Left Sinus: No maxillary sinus tenderness or frontal sinus tenderness.      Mouth/Throat:      Mouth: Mucous membranes are moist. No oral lesions.      Pharynx: Oropharynx is clear. No pharyngeal swelling, oropharyngeal exudate, posterior oropharyngeal erythema or uvula swelling.      Comments: Large kay on hard palate noted    Eyes:      General: No scleral icterus.     Conjunctiva/sclera: Conjunctivae normal.      Pupils: Pupils are equal, round, and reactive to light.   Cardiovascular:      Rate and Rhythm: Normal rate and regular rhythm.      Heart sounds: Normal heart sounds.   Pulmonary:      Effort: Pulmonary effort is normal. No respiratory distress.      Breath sounds: No decreased air " movement. No decreased breath sounds, wheezing, rhonchi or rales.   Musculoskeletal:      Cervical back: Normal range of motion and neck supple. No tenderness.      Right lower leg: No edema.      Left lower leg: No edema.   Lymphadenopathy:      Cervical: No cervical adenopathy.   Neurological:      Mental Status: She is alert and oriented to person, place, and time.      Gait: Gait normal.   Psychiatric:         Mood and Affect: Mood normal.         Behavior: Behavior normal.         Thought Content: Thought content normal.         Assessment/ Plan  Diagnoses and all orders for this visit:    Acute sinusitis, recurrence not specified, unspecified location  -     ibuprofen (Motrin IB) 200 MG tablet; 3 tabs PO q 8 hours  -     Hypertonic Nasal Wash (Sinus Rinse Bottle Kit) pack; 1 bottle into the nostril(s) as directed by provider 3 (Three) Times a Day.  -     fluticasone (FLONASE) 50 MCG/ACT nasal spray; 2 sprays into the nostril(s) as directed by provider Daily for 30 days. Administer 2 sprays in each nostril daily  -     Respiratory Panel, PCR (WITHOUT COVID) - Swab, Nasopharynx    Acute pharyngitis, unspecified etiology  -     ibuprofen (Motrin IB) 200 MG tablet; 3 tabs PO q 8 hours  -     Hypertonic Nasal Wash (Sinus Rinse Bottle Kit) pack; 1 bottle into the nostril(s) as directed by provider 3 (Three) Times a Day.  -     fluticasone (FLONASE) 50 MCG/ACT nasal spray; 2 sprays into the nostril(s) as directed by provider Daily for 30 days. Administer 2 sprays in each nostril daily  -     Respiratory Panel, PCR (WITHOUT COVID) - Swab, Nasopharynx    Fatigue, unspecified type  -     ibuprofen (Motrin IB) 200 MG tablet; 3 tabs PO q 8 hours  -     Hypertonic Nasal Wash (Sinus Rinse Bottle Kit) pack; 1 bottle into the nostril(s) as directed by provider 3 (Three) Times a Day.  -     fluticasone (FLONASE) 50 MCG/ACT nasal spray; 2 sprays into the nostril(s) as directed by provider Daily for 30 days. Administer 2 sprays  in each nostril daily  -     Respiratory Panel, PCR (WITHOUT COVID) - Swab, Nasopharynx        Return for Next scheduled follow up.      Discussion:  Bernice Dunlap is a 66 y.o. female RTC In acute care (new issue to examiner) with 2 weeks of mild sinusitis and pharyngitis sx.  Exam largely benign today. Will send respiratory panel to help clarify as hx suggests viral etiology. Start conservative care med regimen as noted above, reassured on SHORT TERM NSAID use only. Will f/u after viral panel returns.

## 2021-12-05 LAB
B PERT.PT PRMT NPH QL NAA+NON-PROBE: NOT DETECTED
C PNEUM DNA NPH QL NAA+NON-PROBE: NOT DETECTED
FLUAV H1 2009 PAN RNA NPH NAA+NON-PROBE: NOT DETECTED
FLUAV H1 RNA NPH QL NAA+NON-PROBE: NOT DETECTED
FLUAV H3 RNA NPH QL NAA+NON-PROBE: NOT DETECTED
FLUAV RNA NPH QL NAA+NON-PROBE: NOT DETECTED
FLUBV RNA NPH QL NAA+NON-PROBE: NOT DETECTED
HADV DNA NPH QL NAA+NON-PROBE: NOT DETECTED
HCOV 229E RNA NPH QL NAA+NON-PROBE: NOT DETECTED
HCOV HKU1 RNA NPH QL NAA+NON-PROBE: NOT DETECTED
HCOV NL63 RNA NPH QL NAA+NON-PROBE: NOT DETECTED
HCOV OC43 RNA NPH QL NAA+NON-PROBE: NOT DETECTED
HMPV RNA NPH QL NAA+NON-PROBE: NOT DETECTED
HPIV1 RNA NPH QL NAA+NON-PROBE: NOT DETECTED
HPIV2 RNA NPH QL NAA+NON-PROBE: NOT DETECTED
HPIV3 RNA NPH QL NAA+NON-PROBE: NOT DETECTED
HPIV4 RNA NPH QL NAA+NON-PROBE: NOT DETECTED
M PNEUMO DNA NPH QL NAA+NON-PROBE: NOT DETECTED
RSV RNA NPH QL NAA+NON-PROBE: NOT DETECTED
RV+EV RNA NPH QL NAA+NON-PROBE: NOT DETECTED

## 2021-12-15 ENCOUNTER — OFFICE VISIT (OUTPATIENT)
Dept: CARDIOLOGY | Facility: CLINIC | Age: 66
End: 2021-12-15

## 2021-12-15 VITALS
BODY MASS INDEX: 27.64 KG/M2 | SYSTOLIC BLOOD PRESSURE: 173 MMHG | HEART RATE: 61 BPM | HEIGHT: 63 IN | WEIGHT: 156 LBS | DIASTOLIC BLOOD PRESSURE: 80 MMHG

## 2021-12-15 DIAGNOSIS — Z95.1 S/P CABG X 1: Primary | ICD-10-CM

## 2021-12-15 DIAGNOSIS — I10 ESSENTIAL HYPERTENSION: ICD-10-CM

## 2021-12-15 PROCEDURE — 99213 OFFICE O/P EST LOW 20 MIN: CPT | Performed by: INTERNAL MEDICINE

## 2021-12-15 PROCEDURE — 93000 ELECTROCARDIOGRAM COMPLETE: CPT | Performed by: INTERNAL MEDICINE

## 2021-12-15 NOTE — PROGRESS NOTES
Date of Office Visit: 12/15/21  Encounter Provider: Rashad Contreras MD  Place of Service: [facility] CARDIOLOGY  Patient Name: Bernice Dunlap  :1955  [id number]    Chief Complaint   Patient presents with   • Coronary Artery Disease   :     HPI: Benrice Dunlap is a 66 y.o. female she is here for follow-up she had a myocardial infarction in  and then ultimately had a one-vessel CABG in  she last had a stress test in 2018 and that was normal.  So she had unstable angina and had a 3.ox15 resolute drug-eluting stent implanted to her mid circumflex in 2019 at [facility].  She has done very well since then she had a little bit of mild 50% disease in a small ZOIE but otherwise her coronaries were good and her LV function was normal.      She is here for follow-up and has been doing well no chest pain shortness of breath PND orthopnea edema blood pressure is high today I rechecked it it still is high she is not sure because been okay when she has been at home and other doctors office visits        Past Medical History:   Diagnosis Date   • Arteriosclerosis of carotid artery     1-15% B 2013 U/S --> 16-49% bilaterally in    • Arteriosclerotic cardiovascular disease (ASCVD)     MI ; s/p CABG x 1    • Benign essential hypertension    • BPPV (benign paroxysmal positional vertigo)    • Fibromyalgia    • History of nephrolithiasis     on L; non-obstructing on  CT; reidentified on X-ray in    • Hydrothorax 3/11/2016   • Hyperlipidemia    • Migraine headache    • Myocardial infarction (HCC)     Onset Date:    • Pleural effusion 3/11/2016   • Pre-diabetes    • Stress and adjustment reaction 3/11/2016   • Vitamin D deficiency        Past Surgical History:   Procedure Laterality Date   • APPENDECTOMY     • CHOLECYSTECTOMY      Laparoscopic   • CORONARY ANGIOPLASTY  10/2019   • CORONARY ARTERY BYPASS GRAFT      1 vessel bypass. Mammary artery to the  LAD; ; nl stress in    • HYSTERECTOMY     • SALPINGO OOPHORECTOMY Bilateral    • THORACOSCOPY      With Pleurodesis (Therapeutic) -  after traumatic effusion; Dr. Tomas       Social History     Socioeconomic History   • Marital status:    • Number of children: 1   Tobacco Use   • Smoking status: Former Smoker     Quit date: 2019     Years since quittin.9   • Smokeless tobacco: Never Used   • Tobacco comment: 6 pk/yr hx; occasionally smokes; quit 2019   Vaping Use   • Vaping Use: Never used   Substance and Sexual Activity   • Alcohol use: No     Comment: caffeine use minimal   • Drug use: No   • Sexual activity: Not Currently     Partners: Male     Comment: no hx STD's       Family History   Problem Relation Age of Onset   • Heart disease Mother         Arteriosclerotic cardiovascular disease; dx'd in 50's   • Alcohol abuse Father         Alcoholism   • COPD Father    • Depression Sister    • Leukemia Sister    • Diabetes Sister         Type 2 Diabetes Mellitus   • Alcohol abuse Brother         Alcoholism   • Hypertension Brother         Benign Essential Hypertension   • Glaucoma Brother    • Hyperlipidemia Brother    • No Known Problems Son    • No Known Problems Maternal Grandmother    • No Known Problems Maternal Grandfather    • No Known Problems Paternal Grandmother    • No Known Problems Paternal Grandfather        Review of Systems   Constitutional: Negative for decreased appetite, fever, malaise/fatigue and weight loss.   HENT: Negative for nosebleeds.    Eyes: Negative for double vision.   Cardiovascular: Negative for chest pain, claudication, cyanosis, dyspnea on exertion, irregular heartbeat, leg swelling, near-syncope, orthopnea, palpitations, paroxysmal nocturnal dyspnea and syncope.   Respiratory: Negative for cough, hemoptysis and shortness of breath.    Hematologic/Lymphatic: Negative for bleeding problem.   Skin: Negative for rash.   Musculoskeletal: Negative for falls and  "myalgias.   Gastrointestinal: Negative for hematochezia, jaundice, melena, nausea and vomiting.   Genitourinary: Negative for hematuria.   Neurological: Negative for dizziness and seizures.   Psychiatric/Behavioral: Negative for altered mental status and memory loss.       Allergies   Allergen Reactions   • Imitrex [Sumatriptan] Other (See Comments)     MI   • Lisinopril Cough         Current Outpatient Medications:   •  amLODIPine (NORVASC) 5 MG tablet, Take 1 tablet by mouth Daily., Disp: 90 tablet, Rfl: 3  •  Cholecalciferol (HM VITAMIN D3) 2000 units capsule, Take 2,000 Units by mouth Daily., Disp: 30 each, Rfl:   •  clopidogrel (PLAVIX) 75 MG tablet, TAKE 1 TABLET BY MOUTH DAILY, Disp: 30 tablet, Rfl: 5  •  Hypertonic Nasal Wash (Sinus Rinse Bottle Kit) pack, 1 bottle into the nostril(s) as directed by provider 3 (Three) Times a Day., Disp: 1 each, Rfl: 0  •  losartan (COZAAR) 50 MG tablet, TAKE 1 TABLET BY MOUTH DAILY, Disp: 30 tablet, Rfl: 5  •  metoprolol succinate XL (TOPROL-XL) 50 MG 24 hr tablet, Take 1 tablet by mouth Daily., Disp: 90 tablet, Rfl: 2  •  psyllium (METAMUCIL) 58.6 % powder, Take  by mouth Daily., Disp: , Rfl: 12  •  rosuvastatin (CRESTOR) 40 MG tablet, Take 1 tablet by mouth Daily., Disp: 90 tablet, Rfl: 2  •  traZODone (DESYREL) 50 MG tablet, Take 1 tablet by mouth every night at bedtime., Disp: 30 tablet, Rfl: 4  •  fluticasone (FLONASE) 50 MCG/ACT nasal spray, 2 sprays into the nostril(s) as directed by provider Daily for 30 days. Administer 2 sprays in each nostril daily, Disp: , Rfl:   •  ibuprofen (Motrin IB) 200 MG tablet, 3 tabs PO q 8 hours, Disp: 30 tablet, Rfl: 0      Objective:     Vitals:    12/15/21 1212 12/15/21 1224   BP: 176/80 173/80   BP Location: Left arm Left arm   Patient Position: Sitting Standing   Pulse: 61 61   Weight: 70.8 kg (156 lb)    Height: 160 cm (63\")      Body mass index is 27.63 kg/m².    Physical Exam  Constitutional:       Appearance: She is " well-developed.   HENT:      Head: Normocephalic.   Eyes:      General: No scleral icterus.  Neck:      Thyroid: No thyromegaly.      Vascular: No JVD.   Cardiovascular:      Rate and Rhythm: Normal rate and regular rhythm.      Heart sounds: Normal heart sounds. No murmur heard.  No friction rub. No gallop.    Pulmonary:      Effort: Pulmonary effort is normal.      Breath sounds: Normal breath sounds. No wheezing or rales.   Chest:       Abdominal:      Palpations: Abdomen is soft.      Tenderness: There is no abdominal tenderness.   Musculoskeletal:         General: Normal range of motion.   Lymphadenopathy:      Cervical: No cervical adenopathy.   Skin:     General: Skin is warm and dry.      Findings: No rash.   Neurological:      Mental Status: She is alert and oriented to person, place, and time.           ECG 12 Lead    Date/Time: 12/15/2021 12:36 PM  Performed by: Rashad Contreras MD  Authorized by: Rashad Contreras MD   Comparison: compared with previous ECG   Similar to previous ECG  Rhythm: sinus rhythm    Clinical impression: normal ECG             Assessment:       Diagnosis Plan   1. S/P CABG x 1     2. Essential hypertension            Plan:       I think that she is doing pretty well I have asked her to check her blood pressures at home and watch it and not can make any other changes she is pretty active medicines are good discussed have her come back and see us in a year sooner if she has trouble    As always, it has been a pleasure to participate in your patient's care.      Sincerely,       Rashad Contreras MD

## 2021-12-17 ENCOUNTER — APPOINTMENT (OUTPATIENT)
Dept: BONE DENSITY | Facility: HOSPITAL | Age: 66
End: 2021-12-17

## 2021-12-28 ENCOUNTER — TELEPHONE (OUTPATIENT)
Dept: INTERNAL MEDICINE | Facility: CLINIC | Age: 66
End: 2021-12-28

## 2021-12-28 DIAGNOSIS — I70.90 ARTERIOSCLEROTIC VASCULAR DISEASE: ICD-10-CM

## 2021-12-28 DIAGNOSIS — E78.2 MIXED HYPERLIPIDEMIA: ICD-10-CM

## 2021-12-28 RX ORDER — ROSUVASTATIN CALCIUM 40 MG/1
40 TABLET, COATED ORAL DAILY
Qty: 90 TABLET | Refills: 2 | Status: SHIPPED | OUTPATIENT
Start: 2021-12-28 | End: 2022-01-10 | Stop reason: SDUPTHER

## 2021-12-28 NOTE — TELEPHONE ENCOUNTER
Caller: Bernice Dunlap    Relationship: Self    Best call back number: [phone]    Requested Prescriptions:   Requested Prescriptions     Pending Prescriptions Disp Refills   • rosuvastatin (CRESTOR) 40 MG tablet 90 tablet 2     Sig: Take 1 tablet by mouth Daily.        Pharmacy where request should be sent: [facility] #[id number] [place] [address] [address] AT [facility] [phone] [facility] [phone] FX     Additional details provided by patient: OUT OF MEDICATION     Does the patient have less than a 3 day supply:  [x] Yes  [] No    Machelle Jeffrey Rep   12/28/21 15:20 EST

## 2022-01-10 DIAGNOSIS — I10 BENIGN ESSENTIAL HYPERTENSION: ICD-10-CM

## 2022-01-10 DIAGNOSIS — E78.2 MIXED HYPERLIPIDEMIA: ICD-10-CM

## 2022-01-10 DIAGNOSIS — I70.90 ARTERIOSCLEROTIC VASCULAR DISEASE: ICD-10-CM

## 2022-01-10 RX ORDER — METOPROLOL SUCCINATE 50 MG/1
50 TABLET, EXTENDED RELEASE ORAL DAILY
Qty: 90 TABLET | Refills: 2 | Status: SHIPPED | OUTPATIENT
Start: 2022-01-10 | End: 2022-04-20 | Stop reason: SDUPTHER

## 2022-01-10 RX ORDER — AMLODIPINE BESYLATE 5 MG/1
5 TABLET ORAL DAILY
Qty: 90 TABLET | Refills: 3 | Status: SHIPPED | OUTPATIENT
Start: 2022-01-10 | End: 2022-11-22 | Stop reason: SDUPTHER

## 2022-01-10 RX ORDER — ROSUVASTATIN CALCIUM 40 MG/1
40 TABLET, COATED ORAL DAILY
Qty: 90 TABLET | Refills: 2 | Status: SHIPPED | OUTPATIENT
Start: 2022-01-10 | End: 2022-02-01 | Stop reason: SDUPTHER

## 2022-01-10 NOTE — TELEPHONE ENCOUNTER
Caller: Bernice Dunlap    Relationship: Self    Best call back number:[phone]     Requested Prescriptions:   Requested Prescriptions     Pending Prescriptions Disp Refills   • metoprolol succinate XL (TOPROL-XL) 50 MG 24 hr tablet 90 tablet 2     Sig: Take 1 tablet by mouth Daily.   • amLODIPine (NORVASC) 5 MG tablet 90 tablet 3     Sig: Take 1 tablet by mouth Daily.   • rosuvastatin (CRESTOR) 40 MG tablet 90 tablet 2     Sig: Take 1 tablet by mouth Daily.        Pharmacy where request should be sent: [facility] #[id number] [address] AT [facility] [phone] [facility] [phone]      Additional details provided by patient: OUT OF METOPROLOL     Does the patient have less than a 3 day supply:  [x] Yes  [] No    Verona Nathan   01/10/22 08:05 EST

## 2022-01-12 DIAGNOSIS — R73.03 PRE-DIABETES: ICD-10-CM

## 2022-01-12 DIAGNOSIS — I10 ESSENTIAL HYPERTENSION: Primary | ICD-10-CM

## 2022-02-01 DIAGNOSIS — E78.2 MIXED HYPERLIPIDEMIA: ICD-10-CM

## 2022-02-01 DIAGNOSIS — I70.90 ARTERIOSCLEROTIC VASCULAR DISEASE: ICD-10-CM

## 2022-02-01 RX ORDER — ROSUVASTATIN CALCIUM 40 MG/1
40 TABLET, COATED ORAL DAILY
Qty: 90 TABLET | Refills: 2 | Status: SHIPPED | OUTPATIENT
Start: 2022-02-01 | End: 2022-09-13 | Stop reason: SDUPTHER

## 2022-02-04 RX ORDER — LOSARTAN POTASSIUM 50 MG/1
50 TABLET ORAL DAILY
Qty: 30 TABLET | Refills: 5 | Status: SHIPPED | OUTPATIENT
Start: 2022-02-04 | End: 2022-05-13 | Stop reason: SDUPTHER

## 2022-02-21 ENCOUNTER — OFFICE VISIT (OUTPATIENT)
Dept: INTERNAL MEDICINE | Facility: CLINIC | Age: 67
End: 2022-02-21

## 2022-02-21 VITALS
TEMPERATURE: 97.4 F | DIASTOLIC BLOOD PRESSURE: 70 MMHG | HEIGHT: 63 IN | OXYGEN SATURATION: 99 % | WEIGHT: 151 LBS | SYSTOLIC BLOOD PRESSURE: 144 MMHG | HEART RATE: 70 BPM | BODY MASS INDEX: 26.75 KG/M2

## 2022-02-21 DIAGNOSIS — R73.03 PRE-DIABETES: ICD-10-CM

## 2022-02-21 DIAGNOSIS — Z00.00 HEALTHCARE MAINTENANCE: ICD-10-CM

## 2022-02-21 DIAGNOSIS — F51.02 INSOMNIA, TRANSIENT: ICD-10-CM

## 2022-02-21 DIAGNOSIS — I10 ESSENTIAL HYPERTENSION: Primary | ICD-10-CM

## 2022-02-21 DIAGNOSIS — R42 VERTIGO: ICD-10-CM

## 2022-02-21 PROCEDURE — 90715 TDAP VACCINE 7 YRS/> IM: CPT | Performed by: INTERNAL MEDICINE

## 2022-02-21 PROCEDURE — 99214 OFFICE O/P EST MOD 30 MIN: CPT | Performed by: INTERNAL MEDICINE

## 2022-02-21 PROCEDURE — 90471 IMMUNIZATION ADMIN: CPT | Performed by: INTERNAL MEDICINE

## 2022-02-21 NOTE — PROGRESS NOTES
"Hypertension      HPI  Bernice Dunlap is a 66 y.o. female RTC in f/u:  \"Oh alright\".  Feels like cannot sleep. Feels like lays down and feels like mind races.  Dealing with this for 'last 3 nights'.  'Had it before'.  Had been dealing with some neck pain from positional issues at work.  Thought that was it, but then last few nights has really struggled. Is off phone prior to bed. Is not taking in caffeine late at night. Notes has not slept 'at all'.  No naps during day. No OTC meds used. Melatonin in past.  No exercise at all.      Has had some issues with 'falling'.  Has been happening a few times in last month or so. Two events a week apart where was walking and felt like 'falling'. First time actually fell.  Second time felt like 'falling in slow motion but I never hit the ground'. Bullitt feeling. No hearing changes.  No new ringing in ear. No HA.  Had vertigo sx at these times. Has seen ENT in past and  Has Epley exercises, but did not do the exercises around that time.     1. HTN - on three drugs. Home log with good numbers,120/78. Had some panic attack looking for office today, but did find it.     2. Pre-DM -  weight has been stable.  Off exercise. No changes with diet.  No labs prior to today.     3. HM - not had Flu or COVID booster yet this year.        Review of Systems   Constitutional: Negative for chills and fever.   HENT: Negative for hearing loss and tinnitus.    Eyes: Negative for vision loss in left eye and vision loss in right eye.   Cardiovascular: Negative for chest pain, dyspnea on exertion, irregular heartbeat and palpitations.   Respiratory: Negative for shortness of breath.    Musculoskeletal: Positive for falls.   Neurological: Positive for vertigo. Negative for dizziness, focal weakness, headaches and light-headedness.   Psychiatric/Behavioral: Negative for altered mental status. The patient has insomnia and is nervous/anxious (variable).        The following portions of the patient's " "history were reviewed and updated as appropriate: allergies, current medications, past medical history, past social history and problem list.      Current Outpatient Medications:   •  amLODIPine (NORVASC) 5 MG tablet, Take 1 tablet by mouth Daily., Disp: 90 tablet, Rfl: 3  •  Cholecalciferol (HM VITAMIN D3) 2000 units capsule, Take 2,000 Units by mouth Daily., Disp: 30 each, Rfl:   •  clopidogrel (PLAVIX) 75 MG tablet, TAKE 1 TABLET BY MOUTH DAILY, Disp: 30 tablet, Rfl: 5  •  ibuprofen (Motrin IB) 200 MG tablet, 3 tabs PO q 8 hours, Disp: 30 tablet, Rfl: 0  •  losartan (COZAAR) 50 MG tablet, TAKE 1 TABLET BY MOUTH DAILY, Disp: 30 tablet, Rfl: 5  •  metoprolol succinate XL (TOPROL-XL) 50 MG 24 hr tablet, Take 1 tablet by mouth Daily., Disp: 90 tablet, Rfl: 2  •  psyllium (METAMUCIL) 58.6 % powder, Take  by mouth Daily., Disp: , Rfl: 12  •  rosuvastatin (CRESTOR) 40 MG tablet, Take 1 tablet by mouth Daily., Disp: 90 tablet, Rfl: 2  •  fluticasone (FLONASE) 50 MCG/ACT nasal spray, 2 sprays into the nostril(s) as directed by provider Daily for 30 days. Administer 2 sprays in each nostril daily, Disp: , Rfl:   •  Hypertonic Nasal Wash (Sinus Rinse Bottle Kit) pack, 1 bottle into the nostril(s) as directed by provider 3 (Three) Times a Day., Disp: 1 each, Rfl: 0  •  traZODone (DESYREL) 50 MG tablet, Take 1 tablet by mouth every night at bedtime., Disp: 30 tablet, Rfl: 4    Vitals:    02/21/22 0847 02/21/22 0912   BP: 146/72 144/70   Pulse: 70    Temp: 97.4 °F (36.3 °C)    TempSrc: Temporal    SpO2: 99%    Weight: 68.5 kg (151 lb)    Height: 160 cm (63\")      Body mass index is 26.75 kg/m².      Physical Exam  Constitutional:       General: She is not in acute distress.     Appearance: Normal appearance. She is normal weight. She is not ill-appearing or toxic-appearing.   HENT:      Head: Normocephalic and atraumatic.   Eyes:      General: No scleral icterus.     Pupils: Pupils are equal, round, and reactive to light. "   Neck:      Vascular: No carotid bruit.   Cardiovascular:      Rate and Rhythm: Normal rate and regular rhythm.      Heart sounds: No murmur heard.  No friction rub. No gallop.    Pulmonary:      Effort: Pulmonary effort is normal. No respiratory distress.      Breath sounds: No wheezing, rhonchi or rales.   Musculoskeletal:      Cervical back: Normal range of motion and neck supple. No tenderness.      Right lower leg: No edema.      Left lower leg: No edema.   Lymphadenopathy:      Cervical: No cervical adenopathy.   Neurological:      General: No focal deficit present.      Mental Status: She is alert.      Cranial Nerves: No cranial nerve deficit.      Gait: Gait normal.      Comments: Maikol Hallpike (+) nystagmus B, both L and R gaze. No vertigo sx induced.   Psychiatric:         Mood and Affect: Mood normal.         Behavior: Behavior normal.         Thought Content: Thought content normal.         Assessment/ Plan  Diagnoses and all orders for this visit:    Essential hypertension  -     Comprehensive Metabolic Panel  -     TSH  -     UA / M With / Rflx Culture([facility] ONLY) - Urine, Clean Catch    Pre-diabetes  -     Comprehensive Metabolic Panel  -     Hemoglobin A1c    Insomnia, transient  -     CBC & Differential  -     Comprehensive Metabolic Panel  -     TSH  -     UA / M With / Rflx Culture([facility] ONLY) - Urine, Clean Catch  -     Hemoglobin A1c    Vertigo  -     Ambulatory Referral to Physical Therapy Vestibular, Evaluate and treat  -     CBC & Differential  -     Comprehensive Metabolic Panel  -     TSH  -     UA / M With / Rflx Culture([facility] ONLY) - Urine, Clean Catch    Healthcare maintenance  -     Tdap Vaccine Greater Than or Equal To 6yo IM        Return in about 3 months (around 5/21/2022) for Recheck.      Discussion:  Bernice Dunlap is a 66 y.o. female RTC in f/u:   1. Insomnia - variable over time. Currently in bad cycle and admits that has anxiety about not sleeping prior to going to  bed.  Rough last 3 nights.  C/W sleep hygiene as she is.  Advised daily exercise and light stool in evening.   Recommended Headspace sleep series to watch.  OK for melatonin OTC nightly.  Sparing use of OTC sleep aides, but OK to use cautiously in near term to help with sleep onset.    2. Vertigo - hx of BPPV responding to exercises in past.  Recent events of vertigo noted, however, not able to reproduce sx in office (though nystagmus is clear on Maikol-Hallpike).  No red flag sx. Will send to PT for vestibular testing and tx.   3. HTN - on three drugs, borderline numbers. Good home log, admitted anxiety in office today.  Will c/w current meds and home log, noting pt in sleep deprived state today with #1.  Check BMP today. Call if numbers are not consistently at goal on home log.   4. Pre-DM -  weight and diet stable, but off exercise. Trend A1C and FBS today.   5. Fibromyalgia - long term controlled on Lyrica, off due to blurred vision and doing well overall from pain standpoint. Restart exercise program.     RTC 3 months, F labs prior (CMP, A1c)

## 2022-02-22 LAB
ALBUMIN SERPL-MCNC: 4.6 G/DL (ref 3.8–4.8)
ALBUMIN/GLOB SERPL: 1.6 {RATIO} (ref 1.2–2.2)
ALP SERPL-CCNC: 74 IU/L (ref 44–121)
ALT SERPL-CCNC: 16 IU/L (ref 0–32)
APPEARANCE UR: CLEAR
AST SERPL-CCNC: 19 IU/L (ref 0–40)
BACTERIA #/AREA URNS HPF: NORMAL /[HPF]
BASOPHILS # BLD AUTO: 0 X10E3/UL (ref 0–0.2)
BASOPHILS NFR BLD AUTO: 1 %
BILIRUB SERPL-MCNC: 0.5 MG/DL (ref 0–1.2)
BILIRUB UR QL STRIP: NEGATIVE
BUN SERPL-MCNC: 10 MG/DL (ref 8–27)
BUN/CREAT SERPL: 15 (ref 12–28)
CALCIUM SERPL-MCNC: 9.7 MG/DL (ref 8.7–10.3)
CASTS URNS QL MICRO: NORMAL /LPF
CHLORIDE SERPL-SCNC: 102 MMOL/L (ref 96–106)
CO2 SERPL-SCNC: 21 MMOL/L (ref 20–29)
COLOR UR: YELLOW
CREAT SERPL-MCNC: 0.65 MG/DL (ref 0.57–1)
EOSINOPHIL # BLD AUTO: 0.1 X10E3/UL (ref 0–0.4)
EOSINOPHIL NFR BLD AUTO: 3 %
EPI CELLS #/AREA URNS HPF: NORMAL /HPF (ref 0–10)
ERYTHROCYTE [DISTWIDTH] IN BLOOD BY AUTOMATED COUNT: 12.4 % (ref 11.7–15.4)
GLOBULIN SER CALC-MCNC: 2.8 G/DL (ref 1.5–4.5)
GLUCOSE SERPL-MCNC: 92 MG/DL (ref 65–99)
GLUCOSE UR QL STRIP: NEGATIVE
HBA1C MFR BLD: 5.7 % (ref 4.8–5.6)
HCT VFR BLD AUTO: 40.4 % (ref 34–46.6)
HGB BLD-MCNC: 13.7 G/DL (ref 11.1–15.9)
HGB UR QL STRIP: NEGATIVE
IMM GRANULOCYTES # BLD AUTO: 0 X10E3/UL (ref 0–0.1)
IMM GRANULOCYTES NFR BLD AUTO: 0 %
KETONES UR QL STRIP: NEGATIVE
LEUKOCYTE ESTERASE UR QL STRIP: NEGATIVE
LYMPHOCYTES # BLD AUTO: 2.1 X10E3/UL (ref 0.7–3.1)
LYMPHOCYTES NFR BLD AUTO: 44 %
MCH RBC QN AUTO: 30 PG (ref 26.6–33)
MCHC RBC AUTO-ENTMCNC: 33.9 G/DL (ref 31.5–35.7)
MCV RBC AUTO: 89 FL (ref 79–97)
MICRO URNS: NORMAL
MICRO URNS: NORMAL
MONOCYTES # BLD AUTO: 0.4 X10E3/UL (ref 0.1–0.9)
MONOCYTES NFR BLD AUTO: 7 %
NEUTROPHILS # BLD AUTO: 2.2 X10E3/UL (ref 1.4–7)
NEUTROPHILS NFR BLD AUTO: 45 %
NITRITE UR QL STRIP: NEGATIVE
PH UR STRIP: 5.5 [PH] (ref 5–7.5)
PLATELET # BLD AUTO: 349 X10E3/UL (ref 150–450)
POTASSIUM SERPL-SCNC: 4.4 MMOL/L (ref 3.5–5.2)
PROT SERPL-MCNC: 7.4 G/DL (ref 6–8.5)
PROT UR QL STRIP: NEGATIVE
RBC # BLD AUTO: 4.56 X10E6/UL (ref 3.77–5.28)
RBC #/AREA URNS HPF: NORMAL /HPF (ref 0–2)
SODIUM SERPL-SCNC: 137 MMOL/L (ref 134–144)
SP GR UR STRIP: 1.02 (ref 1–1.03)
TSH SERPL DL<=0.005 MIU/L-ACNC: 0.87 UIU/ML (ref 0.45–4.5)
URINALYSIS REFLEX: NORMAL
UROBILINOGEN UR STRIP-MCNC: 0.2 MG/DL (ref 0.2–1)
WBC # BLD AUTO: 4.9 X10E3/UL (ref 3.4–10.8)
WBC #/AREA URNS HPF: NORMAL /HPF (ref 0–5)

## 2022-04-04 RX ORDER — CLOPIDOGREL BISULFATE 75 MG/1
75 TABLET ORAL DAILY
Qty: 30 TABLET | Refills: 5 | Status: SHIPPED | OUTPATIENT
Start: 2022-04-04 | End: 2022-10-27 | Stop reason: SDUPTHER

## 2022-04-20 DIAGNOSIS — I10 BENIGN ESSENTIAL HYPERTENSION: ICD-10-CM

## 2022-04-20 RX ORDER — METOPROLOL SUCCINATE 50 MG/1
50 TABLET, EXTENDED RELEASE ORAL DAILY
Qty: 90 TABLET | Refills: 2 | Status: SHIPPED | OUTPATIENT
Start: 2022-04-20 | End: 2022-08-08

## 2022-04-20 NOTE — TELEPHONE ENCOUNTER
Caller: Bernice Dunlap    Relationship: Self    Best call back number: [phone]     Requested Prescriptions:   Requested Prescriptions     Pending Prescriptions Disp Refills   • metoprolol succinate XL (TOPROL-XL) 50 MG 24 hr tablet 90 tablet 2     Sig: Take 1 tablet by mouth Daily.        Pharmacy where request should be sent: [facility] [address] [place] [address] [phone] [facility] [phone] FX     Additional details provided by patient: NEW PHARMACY. IS TOTALLY OUT    Does the patient have less than a 3 day supply:  [x] Yes  [] No    Machelle Maher Rep   04/20/22 13:04 EDT

## 2022-05-13 ENCOUNTER — TELEPHONE (OUTPATIENT)
Dept: INTERNAL MEDICINE | Facility: CLINIC | Age: 67
End: 2022-05-13

## 2022-05-13 ENCOUNTER — TELEMEDICINE (OUTPATIENT)
Dept: INTERNAL MEDICINE | Facility: CLINIC | Age: 67
End: 2022-05-13

## 2022-05-13 DIAGNOSIS — Z95.1 S/P CABG X 1: ICD-10-CM

## 2022-05-13 DIAGNOSIS — I25.10 CORONARY ARTERY DISEASE INVOLVING NATIVE CORONARY ARTERY OF NATIVE HEART WITHOUT ANGINA PECTORIS: ICD-10-CM

## 2022-05-13 DIAGNOSIS — R07.89 CHEST PRESSURE: Primary | ICD-10-CM

## 2022-05-13 DIAGNOSIS — K21.00 GASTROESOPHAGEAL REFLUX DISEASE WITH ESOPHAGITIS, UNSPECIFIED WHETHER HEMORRHAGE: ICD-10-CM

## 2022-05-13 DIAGNOSIS — I70.90 ARTERIOSCLEROTIC VASCULAR DISEASE: ICD-10-CM

## 2022-05-13 PROCEDURE — 99214 OFFICE O/P EST MOD 30 MIN: CPT | Performed by: INTERNAL MEDICINE

## 2022-05-13 RX ORDER — SUCRALFATE ORAL 1 G/10ML
1 SUSPENSION ORAL
Qty: 414 ML | Refills: 0 | Status: SHIPPED | OUTPATIENT
Start: 2022-05-13 | End: 2022-06-01

## 2022-05-13 RX ORDER — PANTOPRAZOLE SODIUM 40 MG/1
40 TABLET, DELAYED RELEASE ORAL DAILY
Qty: 30 TABLET | Refills: 0 | Status: SHIPPED | OUTPATIENT
Start: 2022-05-13 | End: 2022-06-24 | Stop reason: SDUPTHER

## 2022-05-13 RX ORDER — LOSARTAN POTASSIUM 50 MG/1
50 TABLET ORAL DAILY
Qty: 30 TABLET | Refills: 5 | Status: SHIPPED | OUTPATIENT
Start: 2022-05-13 | End: 2022-11-21 | Stop reason: SDUPTHER

## 2022-05-13 NOTE — TELEPHONE ENCOUNTER
PATIENT REQUESTED A REFILL ON:llosartan (COZAAR) 50 MG tablet THAT SHE LOST IT. SHE ALSO SAID THAT SHE HAS A BAD TEASE FOR HEART BURN AND WOULD LIKE A CALL BACK TO SEE WHAT SHE CAN DO PLEASE ADVISE     PATIENT CAN BE REACHED ON:[phone]     PHARMACY [facility] #[id number] [place] [id number] AT [facility] [phone] [facility] [phone]   [phone]

## 2022-05-13 NOTE — TELEPHONE ENCOUNTER
ERx'd med.   I do not understand message about heartburn ('has a bad tease'???).  Make pt appt, in office, or video to discuss reflux issues.

## 2022-05-13 NOTE — TELEPHONE ENCOUNTER
PATIENT WOULD RATHER HAVE THIS SENT TO   [facility] [address] [place], [place] - [address] [facility] [phone]  - [phone] FX

## 2022-05-13 NOTE — PROGRESS NOTES
"Heartburn (With central chest pressure)      HPI  Bernice Dunlap is a 67 y.o. female presents in acute care via video link in Epic. This visit is occurring during the COVID 19 pandemic.    You have chosen to receive care through a telehealth visit.  Do you consent to use a video/audio connection for your medical care today? Yes    \"I just seem to have a lot of acid... a lot of heartburn\".  Has been thinking about it and notes \"I think it is my diet\".  Eats greene, drinks OJ and drinks coffee daily.  Often will eat and then will lay down, \"and I think that is a problem daily\".     Sx really flared up on Monday.  Cannot recall initial diet trigger.  Initial sx was chest was heavy and was 'really nauseated.  No vomiting. Had 'funny taste in mouth' like was going to vomit. Took some Zantac and helped some but came back. No chest pain and 'not like any I have ever had'.  Has burning sensation in center of chest.  NO issues swallowing. NO water brash sx at all.   Had mild SOA yesterday but not sure if related. Was really active yesterday.   Last 2 days changed diet to lots of bland foods and soft foods. \"I tried to eat a salad and that was a no go. It made the chest feel heavy\".      No meds besides Zantac and one dose of Ariadna [place].     No PPI med use.     Had indigestion in past but never had it like this where last so long. This really feels different than sx in past.     Review of Systems   Constitutional: Negative for chills and fever.   HENT: Negative for odynophagia and sore throat.    Cardiovascular: Negative for chest pain and dyspnea on exertion.   Respiratory: Positive for shortness of breath (mild yesterday. ). Negative for cough, sputum production and wheezing.    Gastrointestinal: Positive for heartburn and nausea. Negative for dysphagia and vomiting.       The following portions of the patient's history were reviewed and updated as appropriate: allergies, current medications, past medical history, past " social history and problem list.      Current Outpatient Medications:   •  amLODIPine (NORVASC) 5 MG tablet, Take 1 tablet by mouth Daily., Disp: 90 tablet, Rfl: 3  •  Cholecalciferol (HM VITAMIN D3) 2000 units capsule, Take 2,000 Units by mouth Daily., Disp: 30 each, Rfl:   •  clopidogrel (PLAVIX) 75 MG tablet, Take 1 tablet by mouth Daily., Disp: 30 tablet, Rfl: 5  •  fluticasone (FLONASE) 50 MCG/ACT nasal spray, 2 sprays into the nostril(s) as directed by provider Daily for 30 days. Administer 2 sprays in each nostril daily, Disp: , Rfl:   •  Hypertonic Nasal Wash (Sinus Rinse Bottle Kit) pack, 1 bottle into the nostril(s) as directed by provider 3 (Three) Times a Day., Disp: 1 each, Rfl: 0  •  ibuprofen (Motrin IB) 200 MG tablet, 3 tabs PO q 8 hours, Disp: 30 tablet, Rfl: 0  •  losartan (COZAAR) 50 MG tablet, Take 1 tablet by mouth Daily., Disp: 30 tablet, Rfl: 5  •  metoprolol succinate XL (TOPROL-XL) 50 MG 24 hr tablet, Take 1 tablet by mouth Daily., Disp: 90 tablet, Rfl: 2  •  pantoprazole (Protonix) 40 MG EC tablet, Take 1 tablet by mouth Daily., Disp: 30 tablet, Rfl: 0  •  psyllium (METAMUCIL) 58.6 % powder, Take  by mouth Daily., Disp: , Rfl: 12  •  rosuvastatin (CRESTOR) 40 MG tablet, Take 1 tablet by mouth Daily., Disp: 90 tablet, Rfl: 2  •  sucralfate (Carafate) 1 GM/10ML suspension, Take 10 mL by mouth 4 (Four) Times a Day With Meals & at Bedtime., Disp: 414 mL, Rfl: 0  •  traZODone (DESYREL) 50 MG tablet, Take 1 tablet by mouth every night at bedtime., Disp: 30 tablet, Rfl: 4    There were no vitals filed for this visit.  There is no height or weight on file to calculate BMI.      Physical Exam  Vitals reviewed.   Constitutional:       General: She is not in acute distress.     Appearance: She is well-developed.   HENT:      Head: Normocephalic.   Pulmonary:      Effort: Pulmonary effort is normal. No respiratory distress.   Neurological:      Mental Status: She is alert and oriented to person, place,  and time.   Psychiatric:         Behavior: Behavior normal.         Thought Content: Thought content normal.         Assessment/ Plan  Diagnoses and all orders for this visit:    Chest pressure  -     sucralfate (Carafate) 1 GM/10ML suspension; Take 10 mL by mouth 4 (Four) Times a Day With Meals & at Bedtime.  -     pantoprazole (Protonix) 40 MG EC tablet; Take 1 tablet by mouth Daily.    Gastroesophageal reflux disease with esophagitis, unspecified whether hemorrhage  -     sucralfate (Carafate) 1 GM/10ML suspension; Take 10 mL by mouth 4 (Four) Times a Day With Meals & at Bedtime.  -     pantoprazole (Protonix) 40 MG EC tablet; Take 1 tablet by mouth Daily.    Coronary artery disease involving native coronary artery of native heart without angina pectoris    Arteriosclerotic vascular disease    S/P CABG x 1        Return for Next scheduled follow up.      Discussion:  Bernice Dunlap is a 67 y.o. female with hx of ASCVD s/p CABG presents in acute care via video link in Epic. This visit is occurring during the COVID 19 pandemic.  Visit scheduled as video visit at 4pm on Friday.   Pt has been dealing with ~4 days of central chest pressure that seems somewhat related to food and only partially relieved by dose of Zantac and marked change in diet over week. Sx seem quite bothersome.  No other associated issues on extensive questioning.  I d/w pt the Ddx includes reflux/ esophagitis vs. Cardiac etiology and my preference for in person eval with EKG.  I will call in a PPI daily and sucralfate QID.  If does not relieve sx will need to be seen at urgent care or ED over weekend for eval and EKG. Pt voiced understanding. Regardless, set reminder to call on Monday AM to f/u on condition.

## 2022-05-16 ENCOUNTER — TELEPHONE (OUTPATIENT)
Dept: INTERNAL MEDICINE | Facility: CLINIC | Age: 67
End: 2022-05-16

## 2022-05-16 NOTE — TELEPHONE ENCOUNTER
----- Message from Bryce Morales MD sent at 5/16/2022  8:03 AM EDT -----  Call pt. How is she feeling with sucralfate and PPI med given on Friday?  Any more chest pain?  Other sx?

## 2022-05-17 ENCOUNTER — TELEPHONE (OUTPATIENT)
Dept: INTERNAL MEDICINE | Facility: CLINIC | Age: 67
End: 2022-05-17

## 2022-05-17 NOTE — TELEPHONE ENCOUNTER
OK, good news.  Take PPI daily.  Sucralfate only PRN for sx.   Will f/u in office 6/1/22 as planned.

## 2022-05-17 NOTE — TELEPHONE ENCOUNTER
PATIENT IS CALLING TO LET DR LOPEZ KNOW THAT SHE IS FEELING MUCH BETTER. PHARMACY DID NOT HAVE MEDICATION WHEN DR LOPEZ HAD CALLED IT IN, IT WAS READY TODAY SO PATIENT PICKED IT UP TODAY AND IS PLANNING TO START TAKING TODAY. CALLBACK NUMBER IS:[phone]

## 2022-05-18 DIAGNOSIS — I10 ESSENTIAL HYPERTENSION: Primary | ICD-10-CM

## 2022-05-18 DIAGNOSIS — R73.03 PRE-DIABETES: ICD-10-CM

## 2022-05-26 LAB
ALBUMIN SERPL-MCNC: 4.8 G/DL (ref 3.8–4.8)
ALBUMIN/GLOB SERPL: 2.1 {RATIO} (ref 1.2–2.2)
ALP SERPL-CCNC: 69 IU/L (ref 44–121)
ALT SERPL-CCNC: 15 IU/L (ref 0–32)
AST SERPL-CCNC: 21 IU/L (ref 0–40)
BILIRUB SERPL-MCNC: 0.9 MG/DL (ref 0–1.2)
BUN SERPL-MCNC: 13 MG/DL (ref 8–27)
BUN/CREAT SERPL: 19 (ref 12–28)
CALCIUM SERPL-MCNC: 9.7 MG/DL (ref 8.7–10.3)
CHLORIDE SERPL-SCNC: 102 MMOL/L (ref 96–106)
CO2 SERPL-SCNC: 21 MMOL/L (ref 20–29)
CREAT SERPL-MCNC: 0.7 MG/DL (ref 0.57–1)
EGFRCR SERPLBLD CKD-EPI 2021: 95 ML/MIN/1.73
GLOBULIN SER CALC-MCNC: 2.3 G/DL (ref 1.5–4.5)
GLUCOSE SERPL-MCNC: 82 MG/DL (ref 65–99)
HBA1C MFR BLD: 5.6 % (ref 4.8–5.6)
POTASSIUM SERPL-SCNC: 4.5 MMOL/L (ref 3.5–5.2)
PROT SERPL-MCNC: 7.1 G/DL (ref 6–8.5)
SODIUM SERPL-SCNC: 139 MMOL/L (ref 134–144)

## 2022-06-01 ENCOUNTER — OFFICE VISIT (OUTPATIENT)
Dept: INTERNAL MEDICINE | Facility: CLINIC | Age: 67
End: 2022-06-01

## 2022-06-01 VITALS
WEIGHT: 152 LBS | TEMPERATURE: 96.8 F | OXYGEN SATURATION: 96 % | HEART RATE: 72 BPM | BODY MASS INDEX: 26.93 KG/M2 | DIASTOLIC BLOOD PRESSURE: 70 MMHG | HEIGHT: 63 IN | SYSTOLIC BLOOD PRESSURE: 130 MMHG

## 2022-06-01 DIAGNOSIS — I70.90 ARTERIOSCLEROTIC VASCULAR DISEASE: Primary | ICD-10-CM

## 2022-06-01 DIAGNOSIS — I10 ESSENTIAL HYPERTENSION: ICD-10-CM

## 2022-06-01 DIAGNOSIS — I25.10 CORONARY ARTERY DISEASE INVOLVING NATIVE CORONARY ARTERY OF NATIVE HEART WITHOUT ANGINA PECTORIS: ICD-10-CM

## 2022-06-01 DIAGNOSIS — K21.9 GASTRIC REFLUX: ICD-10-CM

## 2022-06-01 DIAGNOSIS — R73.03 PRE-DIABETES: ICD-10-CM

## 2022-06-01 DIAGNOSIS — Z95.1 S/P CABG X 1: ICD-10-CM

## 2022-06-01 PROCEDURE — 99214 OFFICE O/P EST MOD 30 MIN: CPT | Performed by: INTERNAL MEDICINE

## 2022-06-01 PROCEDURE — 93000 ELECTROCARDIOGRAM COMPLETE: CPT | Performed by: INTERNAL MEDICINE

## 2022-06-01 NOTE — PATIENT INSTRUCTIONS
Shingrix (new shingles shot; 2 shot series) check at pharmacy  COVID booster now, priority for vaccine now.

## 2022-06-01 NOTE — PROGRESS NOTES
"Hypertension      HPI  Bernice Dunlap is a 67 y.o. female RTC In f/u:  1. ~7 days of central chest pressure that seems somewhat related to food and only partially relieved by dose of Zantac, felt 'bad'. - noted at video visit on 5/13/22.  Pt notes 'it left'.  Really started feeling better prior to taking med. However, once started med felt like has helped 'even more'. Swallowing OK.  No chest pain at this point.  Is going to gym and feels good there, no chest pain there.  \"I feel a lot better than I did before'. Diet had been off, admits 'cut out the greene, drinking more water, cut out sodas'.    2. HTN - on three drugs, borderline numbers a few days last wekk.  Back down to 120's range.     3. Pre-DM -  weight is stable. Diet is better now after issues with reflux.  Really feels like things are stable.   4. HM - not had COVID booster.  Never checked on Shingrix vaccine.     Review of Systems   Constitutional: Negative for chills and fever.   HENT: Negative for odynophagia and sore throat.    Cardiovascular: Negative for chest pain (resolved), dyspnea on exertion, irregular heartbeat and palpitations.   Respiratory: Negative for shortness of breath.    Gastrointestinal: Negative for change in bowel habit, dysphagia, heartburn, nausea and vomiting.   Neurological: Negative for dizziness and light-headedness.       The following portions of the patient's history were reviewed and updated as appropriate: allergies, current medications, past medical history, past social history and problem list.      Current Outpatient Medications:   •  amLODIPine (NORVASC) 5 MG tablet, Take 1 tablet by mouth Daily., Disp: 90 tablet, Rfl: 3  •  Cholecalciferol (HM VITAMIN D3) 2000 units capsule, Take 2,000 Units by mouth Daily., Disp: 30 each, Rfl:   •  clopidogrel (PLAVIX) 75 MG tablet, Take 1 tablet by mouth Daily., Disp: 30 tablet, Rfl: 5  •  Hypertonic Nasal Wash (Sinus Rinse Bottle Kit) pack, 1 bottle into the nostril(s) as " "directed by provider 3 (Three) Times a Day., Disp: 1 each, Rfl: 0  •  ibuprofen (Motrin IB) 200 MG tablet, 3 tabs PO q 8 hours, Disp: 30 tablet, Rfl: 0  •  losartan (COZAAR) 50 MG tablet, Take 1 tablet by mouth Daily., Disp: 30 tablet, Rfl: 5  •  metoprolol succinate XL (TOPROL-XL) 50 MG 24 hr tablet, Take 1 tablet by mouth Daily., Disp: 90 tablet, Rfl: 2  •  pantoprazole (Protonix) 40 MG EC tablet, Take 1 tablet by mouth Daily., Disp: 30 tablet, Rfl: 0  •  psyllium (METAMUCIL) 58.6 % powder, Take  by mouth Daily., Disp: , Rfl: 12  •  rosuvastatin (CRESTOR) 40 MG tablet, Take 1 tablet by mouth Daily., Disp: 90 tablet, Rfl: 2  •  traZODone (DESYREL) 50 MG tablet, Take 1 tablet by mouth every night at bedtime., Disp: 30 tablet, Rfl: 4  •  fluticasone (FLONASE) 50 MCG/ACT nasal spray, 2 sprays into the nostril(s) as directed by provider Daily for 30 days. Administer 2 sprays in each nostril daily, Disp: , Rfl:     Vitals:    06/01/22 0929   BP: 130/70   Pulse: 72   Temp: 96.8 °F (36 °C)   SpO2: 96%   Weight: 68.9 kg (152 lb)   Height: 160 cm (63\")     Body mass index is 26.93 kg/m².        Physical Exam  Constitutional:       General: She is not in acute distress.     Appearance: Normal appearance. She is normal weight. She is not ill-appearing or toxic-appearing.   HENT:      Head: Normocephalic and atraumatic.      Mouth/Throat:      Comments: Large kay on hard palate noted    Eyes:      General: No scleral icterus.     Conjunctiva/sclera: Conjunctivae normal.      Pupils: Pupils are equal, round, and reactive to light.   Neck:      Vascular: No carotid bruit.   Cardiovascular:      Rate and Rhythm: Normal rate and regular rhythm.      Heart sounds: No murmur heard.    No friction rub. No gallop.   Pulmonary:      Effort: Pulmonary effort is normal. No respiratory distress.      Breath sounds: No wheezing, rhonchi or rales.   Musculoskeletal:      Cervical back: Normal range of motion and neck supple. No tenderness. "      Right lower leg: No edema.      Left lower leg: No edema.   Lymphadenopathy:      Cervical: No cervical adenopathy.   Neurological:      General: No focal deficit present.      Mental Status: She is alert.      Cranial Nerves: No cranial nerve deficit.      Gait: Gait normal.   Psychiatric:         Mood and Affect: Mood normal.         Behavior: Behavior normal.         Thought Content: Thought content normal.         ECG 12 Lead    Date/Time: 6/1/2022 10:20 AM  Performed by: Bryce Morales MD  Authorized by: Bryce Morales MD   Comparison: compared with previous ECG from 12/15/2021  Similar to previous ECG  Rhythm: sinus rhythm  Rate: normal  BPM: 65  Conduction: 1st degree AV block  ST Segments: ST segments normal  T Waves: T waves normal  QRS axis: normal  Other: no other findings    Clinical impression: normal ECG  Comments: QTc - 387  Indication - hx of chest pain/ ASCVD, recent chest discomfort              Assessment/ Plan  Diagnoses and all orders for this visit:    Arteriosclerotic vascular disease    Pre-diabetes    S/P CABG x 1    Essential hypertension    Coronary artery disease involving native coronary artery of native heart without angina pectoris    Gastric reflux    Other orders  -     ECG 12 Lead        Return in about 3 months (around 9/1/2022) for Annual physical, Medicare Wellness.      Discussion:  Bernice Dunlap is a 67 y.o. female RTC In f/u:  1. One week of central chest pressure 5/2022 - eval over video visit at time. Pt notes changed diet (greene and soda removal) and on PPI now with fully resolved sx.  Recalls profound sx, but CIARA now. C/W PPI. Will consider EGD with next C-scope, but no red flag sx today to demand.  EKG OK in office.   2. HTN - controlled on three drugs. BMP OK.   3. Pre-DM -  weight is stable. Diet improved after issues in #1. A1C back to normal range. C/W TLC diet mods and gym exercise as she is.   4. HM - COVID booster advised; followed by start of Shingrix  series; Pvax or Prevnar 20 due after 7/2022.     RTC 3 months CPE/ AWV, F labs prior

## 2022-06-24 DIAGNOSIS — G47.09 OTHER INSOMNIA: ICD-10-CM

## 2022-06-24 DIAGNOSIS — R07.89 CHEST PRESSURE: ICD-10-CM

## 2022-06-24 DIAGNOSIS — K21.00 GASTROESOPHAGEAL REFLUX DISEASE WITH ESOPHAGITIS, UNSPECIFIED WHETHER HEMORRHAGE: ICD-10-CM

## 2022-06-24 RX ORDER — TRAZODONE HYDROCHLORIDE 50 MG/1
50 TABLET ORAL
Qty: 30 TABLET | Refills: 4 | Status: SHIPPED | OUTPATIENT
Start: 2022-06-24

## 2022-06-24 RX ORDER — PANTOPRAZOLE SODIUM 40 MG/1
40 TABLET, DELAYED RELEASE ORAL DAILY
Qty: 30 TABLET | Refills: 0 | Status: SHIPPED | OUTPATIENT
Start: 2022-06-24 | End: 2022-08-23

## 2022-06-24 NOTE — TELEPHONE ENCOUNTER
Caller: Bernice Dunlap    Relationship: Self    Best call back number: [phone]    Requested Prescriptions:   Requested Prescriptions     Pending Prescriptions Disp Refills   • traZODone (DESYREL) 50 MG tablet 30 tablet 4     Sig: Take 1 tablet by mouth every night at bedtime.   • pantoprazole (Protonix) 40 MG EC tablet 30 tablet 0     Sig: Take 1 tablet by mouth Daily.        Pharmacy where request should be sent: [facility] [address] [place] [address] [phone] [place] [phone]      Additional details provided by patient: PATIENT IS OUT OF THESE MEDICATIONS.     Does the patient have less than a 3 day supply:  [x] Yes  [] No    Machelle Salas   06/24/22 11:32 EDT

## 2022-07-01 ENCOUNTER — PATIENT OUTREACH (OUTPATIENT)
Dept: PHARMACY | Facility: HOSPITAL | Age: 67
End: 2022-07-01

## 2022-07-01 NOTE — OUTREACH NOTE
Medication Adherence Call    Patient was called today to discuss medication adherence, as she was identified as having care opportunities.    she denies issues with adherence and denies any barriers to receiving medications.    Colette Mark, PharmD  Population Health Pharmacist  07/01/22

## 2022-07-19 ENCOUNTER — TELEPHONE (OUTPATIENT)
Dept: INTERNAL MEDICINE | Facility: CLINIC | Age: 67
End: 2022-07-19

## 2022-07-19 NOTE — TELEPHONE ENCOUNTER
Caller: Bernice Dunlap A    Relationship to patient: Self    Best call back number: [phone]    Date of positive COVID19 test: 7/16    COVID19 symptoms: CONGESTION, FEVER, SINUS DRAINAGE. BP WAS HIGH BUT HAS GONE DOWN.     Additional information or concerns: PLEASE ADVISE IF THERES ANYTHING ELSE SHE SHOULD DO BESIDES TAKING ZINC AND B12. SHE ALSO HAS BEEN TAKING OVER THE COUNTER PAIN MEDS AS WELL.     What is the patients preferred pharmacy:   [id number] [place] [address] [address] AT [facility] [phone] [facility] [phone]

## 2022-07-20 ENCOUNTER — TELEMEDICINE (OUTPATIENT)
Dept: INTERNAL MEDICINE | Facility: CLINIC | Age: 67
End: 2022-07-20

## 2022-07-20 DIAGNOSIS — U07.1 COVID-19 VIRUS INFECTION: Primary | ICD-10-CM

## 2022-07-20 PROCEDURE — 99213 OFFICE O/P EST LOW 20 MIN: CPT | Performed by: STUDENT IN AN ORGANIZED HEALTH CARE EDUCATION/TRAINING PROGRAM

## 2022-07-20 NOTE — PROGRESS NOTES
"  Niels Leger D.O.  Internal Medicine  [facility]  [address], [address]  [place] [address]  [phone]      Chief Complaint  Illness (COVID 19)    SUBJECTIVE    History of Present Illness    Bernice Dunlap is a 67 y.o. female who presents to the office today as an established patient of Dr Bryce Morales MD here today for an acute care visit.   Patient seen via telehealth [employer] audio and video.  verified.     Symptoms started on 22 with headache, sore throat \"I just didn't feel good\".   She got tested on 2022 and was positive for COVID 19. She has a lingering cough but not coughing up anything. No runny nose, no fever but she had chills several days ago. No chest pain or trouble breathing. She feels that she is feeling better but still tired.     Allergies   Allergen Reactions   • Imitrex [Sumatriptan] Other (See Comments)     MI   • Lisinopril Cough        No outpatient medications have been marked as taking for the 22 encounter (Telemedicine) with Niels Leger DO.        Past Medical History:   Diagnosis Date   • Arteriosclerosis of carotid artery     1-15% B 2013 [place] --> 16-49% bilaterally in    • Arteriosclerotic cardiovascular disease (ASCVD)     MI ; s/p CABG x 1    • Benign essential hypertension    • BPPV (benign paroxysmal positional vertigo)    • Fibromyalgia    • History of nephrolithiasis     on L; non-obstructing on  CT; reidentified on X-ray in    • Hydrothorax 3/11/2016   • Hyperlipidemia    • Migraine headache    • Myocardial infarction (HCC)     Onset Date:    • Pleural effusion 3/11/2016   • Pre-diabetes    • Stress and adjustment reaction 3/11/2016   • Vitamin D deficiency        OBJECTIVE    Vital Signs:   There were no vitals taken for this visit.    Physical Exam  Vitals reviewed: PHYSICAL EXAM LIMITED  TELEHEALTH.   Constitutional:       General: She is not in acute distress.     Appearance: She is " not ill-appearing.   Eyes:      General: No scleral icterus.  Pulmonary:      Effort: Pulmonary effort is normal. No respiratory distress.      Comments: Speaking full sentences without respiratory difficulty   Skin:     Coloration: Skin is not jaundiced.   Neurological:      Mental Status: She is alert.   Psychiatric:         Mood and Affect: Mood normal.         Behavior: Behavior normal.         Thought Content: Thought content normal.                             ASSESSMENT & PLAN     Diagnoses and all orders for this visit:    1. COVID-19 virus infection (Primary)  -pt seen via telehealth today for acute care visit  -reports have symptoms of fatigue, cough, sore throat , headache on 7/14/22 with a positive COVID 19 test on 7/16/22  -on video visit she appears in no acute distress, no respiratory distress and overall she reports feeling better day by day  -she days have risk factors for sever COVID 19, including HTN, CAD, age >65 but unfortunately she is already beyond the 5 days from symptom onset so she does not qualify for emergency approved medications such as Paxlovid or Lagevrio   -advised pt that COVID 19 in general is treated like other viral URI: cough/cold medications over the counter, tylenol and ibuprofen, ample fluid intake  -informed pt to report to ER for worsening symptoms, feeling of inability to catch breath, chest pain, worsening fever or chills            The following social determinates of health impact the patient's medical decision making: No social determinates of health were factored in to today's visit.     Follow Up  No follow-ups on file.    Patient/family had no further questions at this time and verbalized understanding of the plan discussed today.

## 2022-08-07 DIAGNOSIS — I10 BENIGN ESSENTIAL HYPERTENSION: ICD-10-CM

## 2022-08-08 RX ORDER — METOPROLOL SUCCINATE 50 MG/1
50 TABLET, EXTENDED RELEASE ORAL DAILY
Qty: 90 TABLET | Refills: 2 | Status: SHIPPED | OUTPATIENT
Start: 2022-08-08 | End: 2023-02-09 | Stop reason: SDUPTHER

## 2022-08-23 DIAGNOSIS — R07.89 CHEST PRESSURE: ICD-10-CM

## 2022-08-23 DIAGNOSIS — K21.00 GASTROESOPHAGEAL REFLUX DISEASE WITH ESOPHAGITIS, UNSPECIFIED WHETHER HEMORRHAGE: ICD-10-CM

## 2022-08-23 RX ORDER — PANTOPRAZOLE SODIUM 40 MG/1
TABLET, DELAYED RELEASE ORAL
Qty: 30 TABLET | Refills: 0 | Status: SHIPPED | OUTPATIENT
Start: 2022-08-23 | End: 2023-02-23 | Stop reason: ALTCHOICE

## 2022-09-06 DIAGNOSIS — E55.9 VITAMIN D DEFICIENCY: ICD-10-CM

## 2022-09-06 DIAGNOSIS — R73.03 PRE-DIABETES: ICD-10-CM

## 2022-09-06 DIAGNOSIS — I10 ESSENTIAL HYPERTENSION: ICD-10-CM

## 2022-09-06 DIAGNOSIS — E78.2 MIXED HYPERLIPIDEMIA: Primary | ICD-10-CM

## 2022-09-12 LAB
ALBUMIN SERPL-MCNC: 4.2 G/DL (ref 3.5–5.2)
ALBUMIN/CREAT UR: 8 MG/G CREAT (ref 0–29)
ALBUMIN/GLOB SERPL: 1.8 G/DL
ALP SERPL-CCNC: 66 U/L (ref 39–117)
ALT SERPL-CCNC: 15 U/L (ref 1–33)
APPEARANCE UR: CLEAR
AST SERPL-CCNC: 22 U/L (ref 1–32)
BACTERIA #/AREA URNS HPF: NORMAL /HPF
BASOPHILS # BLD MANUAL: 0 10*3/MM3 (ref 0–0.2)
BASOPHILS NFR BLD MANUAL: 0 % (ref 0–1.5)
BILIRUB SERPL-MCNC: 0.7 MG/DL (ref 0–1.2)
BILIRUB UR QL STRIP: NEGATIVE
BUN SERPL-MCNC: 13 MG/DL (ref 8–23)
BUN/CREAT SERPL: 19.7 (ref 7–25)
CALCIUM SERPL-MCNC: 9.6 MG/DL (ref 8.6–10.5)
CASTS URNS QL MICRO: NORMAL /LPF
CHLORIDE SERPL-SCNC: 97 MMOL/L (ref 98–107)
CHOLEST SERPL-MCNC: 179 MG/DL (ref 0–200)
CO2 SERPL-SCNC: 28.5 MMOL/L (ref 22–29)
COLOR UR: YELLOW
CREAT SERPL-MCNC: 0.66 MG/DL (ref 0.57–1)
CREAT UR-MCNC: 128.4 MG/DL
DIFFERENTIAL COMMENT: ABNORMAL
EGFRCR-CYS SERPLBLD CKD-EPI 2021: 96.9 ML/MIN/1.73
EOSINOPHIL # BLD MANUAL: 0.05 10*3/MM3 (ref 0–0.4)
EOSINOPHIL NFR BLD MANUAL: 1 % (ref 0.3–6.2)
EPI CELLS #/AREA URNS HPF: NORMAL /HPF (ref 0–10)
ERYTHROCYTE [DISTWIDTH] IN BLOOD BY AUTOMATED COUNT: 12.3 % (ref 12.3–15.4)
GLOBULIN SER CALC-MCNC: 2.3 GM/DL
GLUCOSE SERPL-MCNC: 84 MG/DL (ref 65–99)
GLUCOSE UR QL STRIP: NEGATIVE
HBA1C MFR BLD: 5.6 % (ref 4.8–5.6)
HCT VFR BLD AUTO: 36.8 % (ref 34–46.6)
HDLC SERPL-MCNC: 49 MG/DL (ref 40–60)
HGB BLD-MCNC: 12.6 G/DL (ref 12–15.9)
HGB UR QL STRIP: NEGATIVE
KETONES UR QL STRIP: NEGATIVE
LDLC SERPL CALC-MCNC: 117 MG/DL (ref 0–100)
LEUKOCYTE ESTERASE UR QL STRIP: NEGATIVE
LYMPHOCYTES # BLD MANUAL: 3.63 10*3/MM3 (ref 0.7–3.1)
LYMPHOCYTES NFR BLD MANUAL: 74 % (ref 19.6–45.3)
MCH RBC QN AUTO: 30.1 PG (ref 26.6–33)
MCHC RBC AUTO-ENTMCNC: 34.2 G/DL (ref 31.5–35.7)
MCV RBC AUTO: 87.8 FL (ref 79–97)
MICRO URNS: NORMAL
MICRO URNS: NORMAL
MICROALBUMIN UR-MCNC: 10.9 UG/ML
MONOCYTES # BLD MANUAL: 0.1 10*3/MM3 (ref 0.1–0.9)
MONOCYTES NFR BLD MANUAL: 2 % (ref 5–12)
NEUTROPHILS # BLD MANUAL: 1.13 10*3/MM3 (ref 1.7–7)
NEUTROPHILS NFR BLD MANUAL: 23 % (ref 42.7–76)
NITRITE UR QL STRIP: NEGATIVE
NRBC BLD AUTO-RTO: 0.2 /100 WBC (ref 0–0.2)
PH UR STRIP: 6.5 [PH] (ref 5–7.5)
PLATELET # BLD AUTO: 314 10*3/MM3 (ref 140–450)
PLATELET BLD QL SMEAR: ABNORMAL
POTASSIUM SERPL-SCNC: 4.5 MMOL/L (ref 3.5–5.2)
PROT SERPL-MCNC: 6.5 G/DL (ref 6–8.5)
PROT UR QL STRIP: NEGATIVE
RBC # BLD AUTO: 4.19 10*6/MM3 (ref 3.77–5.28)
RBC #/AREA URNS HPF: NORMAL /HPF (ref 0–2)
RBC MORPH BLD: ABNORMAL
SODIUM SERPL-SCNC: 136 MMOL/L (ref 136–145)
SP GR UR STRIP: 1.02 (ref 1–1.03)
TRIGL SERPL-MCNC: 70 MG/DL (ref 0–150)
URINALYSIS REFLEX: NORMAL
UROBILINOGEN UR STRIP-MCNC: 0.2 MG/DL (ref 0.2–1)
VLDLC SERPL CALC-MCNC: 13 MG/DL (ref 5–40)
WBC # BLD AUTO: 4.9 10*3/MM3 (ref 3.4–10.8)
WBC #/AREA URNS HPF: NORMAL /HPF (ref 0–5)

## 2022-09-13 ENCOUNTER — OFFICE VISIT (OUTPATIENT)
Dept: INTERNAL MEDICINE | Facility: CLINIC | Age: 67
End: 2022-09-13

## 2022-09-13 VITALS
SYSTOLIC BLOOD PRESSURE: 122 MMHG | HEART RATE: 70 BPM | DIASTOLIC BLOOD PRESSURE: 62 MMHG | WEIGHT: 149 LBS | OXYGEN SATURATION: 98 % | BODY MASS INDEX: 26.4 KG/M2 | TEMPERATURE: 96.9 F | HEIGHT: 63 IN

## 2022-09-13 DIAGNOSIS — I10 ESSENTIAL HYPERTENSION: ICD-10-CM

## 2022-09-13 DIAGNOSIS — N20.0 NEPHROLITHIASIS: ICD-10-CM

## 2022-09-13 DIAGNOSIS — R73.03 PRE-DIABETES: ICD-10-CM

## 2022-09-13 DIAGNOSIS — I70.90 ARTERIOSCLEROTIC VASCULAR DISEASE: ICD-10-CM

## 2022-09-13 DIAGNOSIS — G43.009 MIGRAINE WITHOUT AURA AND WITHOUT STATUS MIGRAINOSUS, NOT INTRACTABLE: ICD-10-CM

## 2022-09-13 DIAGNOSIS — K21.9 GASTRIC REFLUX: ICD-10-CM

## 2022-09-13 DIAGNOSIS — M79.7 FIBROMYALGIA: ICD-10-CM

## 2022-09-13 DIAGNOSIS — Z12.31 ENCOUNTER FOR SCREENING MAMMOGRAM FOR MALIGNANT NEOPLASM OF BREAST: ICD-10-CM

## 2022-09-13 DIAGNOSIS — I65.23 BILATERAL CAROTID ARTERY STENOSIS: ICD-10-CM

## 2022-09-13 DIAGNOSIS — Z00.00 HEALTHCARE MAINTENANCE: ICD-10-CM

## 2022-09-13 DIAGNOSIS — E55.9 VITAMIN D DEFICIENCY: ICD-10-CM

## 2022-09-13 DIAGNOSIS — L72.11 PILAR CYST: ICD-10-CM

## 2022-09-13 DIAGNOSIS — E78.2 MIXED HYPERLIPIDEMIA: ICD-10-CM

## 2022-09-13 DIAGNOSIS — I25.10 CORONARY ARTERY DISEASE INVOLVING NATIVE CORONARY ARTERY OF NATIVE HEART WITHOUT ANGINA PECTORIS: ICD-10-CM

## 2022-09-13 DIAGNOSIS — Z12.11 SCREEN FOR COLON CANCER: ICD-10-CM

## 2022-09-13 DIAGNOSIS — Z00.00 ENCOUNTER FOR ANNUAL WELLNESS VISIT (AWV) IN MEDICARE PATIENT: Primary | ICD-10-CM

## 2022-09-13 DIAGNOSIS — M85.89 OSTEOPENIA OF MULTIPLE SITES: ICD-10-CM

## 2022-09-13 DIAGNOSIS — Z95.1 S/P CABG X 1: ICD-10-CM

## 2022-09-13 DIAGNOSIS — L30.1 VESICULAR PALMOPLANTAR ECZEMA: ICD-10-CM

## 2022-09-13 PROCEDURE — 99397 PER PM REEVAL EST PAT 65+ YR: CPT | Performed by: INTERNAL MEDICINE

## 2022-09-13 PROCEDURE — 1159F MED LIST DOCD IN RCRD: CPT | Performed by: INTERNAL MEDICINE

## 2022-09-13 PROCEDURE — G0439 PPPS, SUBSEQ VISIT: HCPCS | Performed by: INTERNAL MEDICINE

## 2022-09-13 PROCEDURE — 1125F AMNT PAIN NOTED PAIN PRSNT: CPT | Performed by: INTERNAL MEDICINE

## 2022-09-13 PROCEDURE — 96160 PT-FOCUSED HLTH RISK ASSMT: CPT | Performed by: INTERNAL MEDICINE

## 2022-09-13 PROCEDURE — 1170F FXNL STATUS ASSESSED: CPT | Performed by: INTERNAL MEDICINE

## 2022-09-13 RX ORDER — ROSUVASTATIN CALCIUM 40 MG/1
40 TABLET, COATED ORAL DAILY
Qty: 90 TABLET | Refills: 2 | Status: SHIPPED | OUTPATIENT
Start: 2022-09-13 | End: 2022-12-12 | Stop reason: SDUPTHER

## 2022-09-13 NOTE — PROGRESS NOTES
Subjective     Chief Complaint   Patient presents with   • Medicare Wellness-subsequent     Review of medical issues   • Annual Exam       HPI:  Bernice Dunlap is a 67 y.o. female RTC In yearly CPE/ AWV, review of medical issues: has been doing well. Had COVID in 7/2022.  HA and flu like sx. Has recovered except mild fatigue that remains. Did not take Oral anitviral as missed window.   1. HTN - on three drugs. Been checking at home and getting good numbers after high number on wrist cuff at [employer] [employer] appt.   2. Pre-DM -  weight is down on year. Is really working on keeping diet healthy.  Exercise is walking and has exercise bike. No longer has any weights.   3. Insomnia - variable over time. Used some trazodone and did help. Struggling to keep in good pattern but will fail when off med. 'When I dont take it,I cant go to sleep'.  Feels like has anxiety issues about family and will 'worry about everyone else'.  Has tried melatonin.   4. Fibromyalgia - long term controlled on Lyrica, off due to blurred vision and doing well overall from pain standpoint .Been off for years. No return of pain overall. Rarely will have brief even of pain, but is not long lasting.   5. ASCVD s/p CABG in 2008 and (-) stress in 2016 and 11/2018; inpt admit 10/2019 with (+) stress test and new mid RCA lesion  S/p PTCA x 1 -  Doing well. NO C/P or SOA issues.   6. Carotid Artery Stenosis, 16-49% B on 1/2018 U/S; 1-15% --> 16-49% B 1/2018 - asx'ic. NO TIA sx.   7. Nephrolithiasis - new issue 7/2018, seen in ED at [facility]. Remains with retained 5mm stone on CT 2/2019 on L. [place]hown on 2020 CT.   NO sx noted.   8. HLD - on high dose Crestor. No muscle aches with med. May have missed some meds, had some question about fills at pharmacy. Knows needs refill today.   9. Pomphylox eczema - resolved.   No recurrence.   10. HM - did not get Mammo/ DEXA/ C-scope, all interrupted from COVID.  Is willing to completed.     The following portions of the  patient's history were reviewed and updated as appropriate: allergies, current medications, past family history, past medical history, past social history, past surgical history and problem list.    Review of Systems   Constitutional: Positive for weight loss (intentinoal). Negative for chills, fever and malaise/fatigue.   HENT: Negative for congestion, hearing loss, odynophagia and sore throat.    Eyes: Positive for visual disturbance (issues with darkness and bright lights.  Cataracts. ). Negative for discharge, double vision, pain and redness.        Saw optho 2 weeks ago.Has 2 cataracts, ready for removal. Have surgery scheduled.      Cardiovascular: Negative for chest pain, dyspnea on exertion, irregular heartbeat, leg swelling, near-syncope, palpitations and syncope.   Respiratory: Negative for cough and shortness of breath.    Endocrine: Negative for polydipsia, polyphagia and polyuria.   Hematologic/Lymphatic: Negative for bleeding problem. Does not bruise/bleed easily.   Skin: Negative for rash and suspicious lesions.   Musculoskeletal: Negative for joint pain, joint swelling, muscle cramps, muscle weakness and myalgias.   Gastrointestinal: Negative for constipation, diarrhea, dysphagia, heartburn, nausea and vomiting.   Genitourinary: Negative for dysuria, frequency and hematuria.   Neurological: Negative for focal weakness, headaches and light-headedness.   Psychiatric/Behavioral: Negative for altered mental status and depression. The patient has insomnia. The patient is not nervous/anxious.        Patient Care Team:  Bryce Morales MD as PCP - General  Bryce Morales MD as PCP - Family Medicine    Recent Hospitalizations: No recent hospitalization(s).    Depression Screen:   PHQ-2/PHQ-9 Depression Screening 9/13/2022   Little Interest or Pleasure in Doing Things 3-->nearly every day   Feeling Down, Depressed or Hopeless 1-->several days   Trouble Falling or Staying Asleep, or Sleeping Too Much  3-->nearly every day   Feeling Tired or Having Little Energy 3-->nearly every day   Poor Appetite or Overeating 3-->nearly every day   Feeling Bad about Yourself - or that You are a Failure or Have Let Yourself or Your Family Down 0-->not at all   Trouble Concentrating on Things, Such as Reading the Newspaper or Watching Television 0-->not at all   Moving or Speaking So Slowly that Other People Could Have Noticed? Or the Opposite - Being So Fidgety 0-->not at all   Thoughts that You Would be Better Off Dead or of Hurting Yourself in Some Way 0-->not at all   PHQ-9: Brief Depression Severity Measure Score 13       Functional and Cognitive Screening:  Functional & Cognitive Status 9/13/2022   Do you have difficulty preparing food and eating? No   Do you have difficulty bathing yourself, getting dressed or grooming yourself? No   Do you have difficulty using the toilet? No   Do you have difficulty moving around from place to place? No   Do you have trouble with steps or getting out of a bed or a chair? No   Current Diet Well Balanced Diet   Dental Exam Up to date   Eye Exam Up to date   Exercise (times per week) 7 times per week   Current Exercises Include Walking   Do you need help using the phone?  No   Are you deaf or do you have serious difficulty hearing?  No   Do you need help with transportation? No   Do you need help shopping? No   Do you need help preparing meals?  No   Do you need help with housework?  No   Do you need help with laundry? No   Do you need help taking your medications? No   Do you need help managing money? No   Do you ever drive or ride in a car without wearing a seat belt? No   Have you felt unusual stress, anger or loneliness in the last month? Yes   Who do you live with? Alone   If you need help, do you have trouble finding someone available to you? No   Do you have difficulty concentrating, remembering or making decisions? Yes       Does the patient have evidence of cognitive impairment?  "no    Does not need ASA (and currently is not on it)    Vitals:    09/13/22 1356   BP: 122/62   Pulse: 70   Temp: 96.9 °F (36.1 °C)   SpO2: 98%   Weight: 67.6 kg (149 lb)   Height: 160 cm (62.99\")   PainSc:   4   PainLoc: Back       Body mass index is 26.4 kg/m².    Visual Acuity:  No exam data present    Advanced Care Planning:  ACP discussion was held with the patient during this visit. Patient has an advance directive (not in EMR), copy requested.    Objective   Physical Exam  Vitals reviewed. Exam conducted with a chaperone present.   Constitutional:       General: She is not in acute distress.     Appearance: Normal appearance. She is well-developed.   HENT:      Head: Normocephalic and atraumatic.      Right Ear: Hearing, tympanic membrane, ear canal and external ear normal. There is no impacted cerumen.      Left Ear: Hearing, tympanic membrane, ear canal and external ear normal. There is no impacted cerumen.      Nose: Nose normal.      Mouth/Throat:      Mouth: Mucous membranes are moist.      Pharynx: Oropharynx is clear. Uvula midline. No oropharyngeal exudate or posterior oropharyngeal erythema.      Comments: Class II-III mallampati.   Large kay on B hard palate.   Eyes:      General: Lids are normal. No scleral icterus.     Extraocular Movements: Extraocular movements intact.      Conjunctiva/sclera: Conjunctivae normal.      Pupils: Pupils are equal, round, and reactive to light.   Neck:      Thyroid: No thyroid mass or thyromegaly.      Vascular: No carotid bruit.      Trachea: Trachea normal.   Cardiovascular:      Rate and Rhythm: Normal rate and regular rhythm.      Pulses:           Radial pulses are 2+ on the right side and 2+ on the left side.        Dorsalis pedis pulses are 2+ on the right side and 2+ on the left side.        Posterior tibial pulses are 2+ on the right side and 2+ on the left side.      Heart sounds: Normal heart sounds, S1 normal and S2 normal. No murmur heard.    No " friction rub. No gallop.   Pulmonary:      Effort: Pulmonary effort is normal. No respiratory distress.      Breath sounds: Normal breath sounds. No wheezing, rhonchi or rales.   Chest:      Chest wall: Tenderness (focally over erythema focus of central chest keloid scar. ) present. No mass, deformity, swelling or crepitus.   Breasts: Breasts are symmetrical.      Right: No inverted nipple, mass, nipple discharge or skin change.      Left: No inverted nipple, mass, nipple discharge or skin change.       Abdominal:      General: Bowel sounds are normal. There is no distension.      Palpations: Abdomen is soft. There is no mass.      Tenderness: There is no abdominal tenderness. There is no guarding or rebound.   Musculoskeletal:         General: Normal range of motion.      Cervical back: Full passive range of motion without pain, normal range of motion and neck supple.   Lymphadenopathy:      Cervical: No cervical adenopathy.   Skin:     General: Skin is warm and dry.      Findings: No rash.      Comments: Large subq cyst on R parietal region of scalp.      Neurological:      Mental Status: She is alert and oriented to person, place, and time.      Cranial Nerves: No cranial nerve deficit, dysarthria or facial asymmetry.      Sensory: No sensory deficit.      Motor: No tremor or abnormal muscle tone.      Gait: Gait normal.      Deep Tendon Reflexes: Reflexes are normal and symmetric.   Psychiatric:         Behavior: Behavior normal.         Compared to one year ago, the patient feels physical health is the same.  Compared to one year ago, the patient feels mental health is the same.    Reviewed chart for potential of high risk medication in the elderly: yes  Reviewed chart for potential of harmful drug interactions in the elderly:yes    Identification of Risk Factors:  Risk factors include: Advance Directive Discussion  Breast Cancer/Mammogram Screening  Chronic Pain   Colon Cancer Screening  Hearing  Problem  Immunizations Discussed/Encouraged (specific immunizations; Tdap, Hepatitis A Vaccine/Series, Influenza, Pneumococcal 23, Shingrix and COVID19 ).    Patient Self-Management and Personalized Health Advice  The patient has been provided with information about: diet, exercise, weight management and tobacco cessation and preventive services including:   · Annual Wellness Visit (AWV)  · Bone Density Measurements  · Colorectal Cancer Screening, Colonoscopy  · Screening Mammography .    Discussed the patient's BMI with her. The BMI is above average; BMI management plan is completed.    Assessment & Plan   Diagnoses and all orders for this visit:    1. Encounter for annual wellness visit (AWV) in Medicare patient (Primary)  -     Ambulatory Referral For Screening Colonoscopy  -     Mammo Screening Bilateral With CAD; Future  -     DEXA Bone Density Axial    2. Arteriosclerotic vascular disease  -     rosuvastatin (CRESTOR) 40 MG tablet; Take 1 tablet by mouth Daily.  Dispense: 90 tablet; Refill: 2    3. Mixed hyperlipidemia  -     rosuvastatin (CRESTOR) 40 MG tablet; Take 1 tablet by mouth Daily.  Dispense: 90 tablet; Refill: 2    4. Bilateral carotid artery stenosis    5. Coronary artery disease involving native coronary artery of native heart without angina pectoris    6. Essential hypertension    7. Fibromyalgia    8. Gastric reflux    9. Migraine without aura and without status migrainosus, not intractable    10. Nephrolithiasis    11. Osteopenia of multiple sites  -     DEXA Bone Density Axial    12. Pilar cyst    13. Pre-diabetes    14. 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 Vitamin D deficiency    17. Healthcare maintenance    18. Encounter for screening mammogram for malignant neoplasm of breast  -     Mammo Screening Bilateral With CAD; Future    19. Screen for colon cancer  -     Ambulatory Referral For Screening Colonoscopy  -     Mammo Screening Bilateral With CAD; Future  -     DEXA  Bone Density Axial            Diagnoses and all orders for this visit:    Encounter for annual wellness visit (AWV) in Medicare patient  -     Ambulatory Referral For Screening Colonoscopy  -     Mammo Screening Bilateral With CAD; Future  -     DEXA Bone Density Axial    Arteriosclerotic vascular disease  -     rosuvastatin (CRESTOR) 40 MG tablet; Take 1 tablet by mouth Daily.    Mixed hyperlipidemia  -     rosuvastatin (CRESTOR) 40 MG tablet; Take 1 tablet by mouth Daily.    Bilateral carotid artery stenosis    Coronary artery disease involving native coronary artery of native heart without angina pectoris    Essential hypertension    Fibromyalgia    Gastric reflux    Migraine without aura and without status migrainosus, not intractable    Nephrolithiasis    Osteopenia of multiple sites  -     DEXA Bone Density Axial    Pilar cyst    Pre-diabetes    S/P CABG x 1    Vesicular palmoplantar eczema    Vitamin D deficiency    Healthcare maintenance    Encounter for screening mammogram for malignant neoplasm of breast  -     Mammo Screening Bilateral With CAD; Future    Screen for colon cancer  -     Ambulatory Referral For Screening Colonoscopy  -     Mammo Screening Bilateral With CAD; Future  -     DEXA Bone Density Axial        Bernice Dunlap is a 67 y.o. female RTC In yearly CPE/ AWV, review of medical issues:   1.  COVID 7/2022 - sx largely resolved.   2. HTN - controlled on three drugs. Good home log. BMP OK.   3. Pre-DM -  weight is down on year. A1c remains normal.  C/W TLC mods.   4. Insomnia - variable over time.  Trazodone works well, trouble sleeping off med with admitted worry issues over family. Urged pt for daily exercise and light stroll in evening.   Recommended Headspace sleep series to watch/ Monalisa on phone for winddown time.     5. Fibromyalgia - was long term controlled on Lyrica, off due to blurred vision and doing well overall from pain standpoint. CIARA.  6. ASCVD s/p CABG in 2008 and (-) stress  in 2016 and 11/2018; inpt admit 10/2019 with (+) stress test and new mid RCA lesion  S/p PTCA x1 - Asx'ic.  Off Brilinta, on clopidogrel only. Statin, ARB, B-blocker, c/w same. F/U Cards as planned.  7. Carotid Artery Stenosis, 16-49% B on 1/2018 U/S; 1-15% --> 16-49% B 1/2018 - Asx'ic.  C/W statin and clopidogrel.   8. Vitamin D deficiency - on 2000 I.U. Daily.  Trend next labs.   9. Nephrolithiasis - new issue 7/2018, seen in ED at [facility]. Remains with retained 5mm stone on CT 2/2019 on L and  2/2020 CT.    10. HLD - LDL near goal on high dose Crestor. ? Some missed meds recently, accounting for mild LDL increase. C/W statin daily use.   11. Pomphylox eczema - resolved.   No recurrence.   12. Migraine HA - CIARA over> 2 years.   13. Pilar Cyst - scalp, not bothersome. Declines plastics referral for now.   13. Gastric Reflux - off PPI daily with diet mods/ weight loss.  No red flag sx. Trend.    14. HM -  Flu/ COVID booster this season; Tdap / Pvax/ Hep A/ COVID - UTD;  Shingrix at pharmacy; C-scope 2010 (-) --> 10 years, refer again today; Mammo due, refer; Pap D/C'd s/p LEONORA; DEXA due, repeat ordered; Hep C Ab (-) 2014; add back exercise; Preventative care plan provided to pt at end of visit    Return in about 6 months (around 3/13/2023) for Recheck. (CMP, A1C, LDL. U/A)          Current Outpatient Medications:   •  amLODIPine (NORVASC) 5 MG tablet, Take 1 tablet by mouth Daily., Disp: 90 tablet, Rfl: 3  •  Cholecalciferol (HM VITAMIN D3) 2000 units capsule, Take 2,000 Units by mouth Daily., Disp: 30 each, Rfl:   •  clopidogrel (PLAVIX) 75 MG tablet, Take 1 tablet by mouth Daily., Disp: 30 tablet, Rfl: 5  •  losartan (COZAAR) 50 MG tablet, Take 1 tablet by mouth Daily., Disp: 30 tablet, Rfl: 5  •  metoprolol succinate XL (TOPROL-XL) 50 MG 24 hr tablet, TAKE 1 TABLET BY MOUTH DAILY, Disp: 90 tablet, Rfl: 2  •  pantoprazole (PROTONIX) 40 MG EC tablet, Take 1 tablet by mouth once daily, Disp: 30 tablet, Rfl: 0  •   rosuvastatin (CRESTOR) 40 MG tablet, Take 1 tablet by mouth Daily., Disp: 90 tablet, Rfl: 2  •  traZODone (DESYREL) 50 MG tablet, Take 1 tablet by mouth every night at bedtime., Disp: 30 tablet, Rfl: 4  •  fluticasone (FLONASE) 50 MCG/ACT nasal spray, 2 sprays into the nostril(s) as directed by provider Daily for 30 days. Administer 2 sprays in each nostril daily, Disp: , Rfl:   •  Hypertonic Nasal Wash (Sinus Rinse Bottle Kit) pack, 1 bottle into the nostril(s) as directed by provider 3 (Three) Times a Day., Disp: 1 each, Rfl: 0  •  ibuprofen (Motrin IB) 200 MG tablet, 3 tabs PO q 8 hours, Disp: 30 tablet, Rfl: 0  •  psyllium (METAMUCIL) 58.6 % powder, Take  by mouth Daily., Disp: , Rfl: 12

## 2022-09-13 NOTE — PATIENT INSTRUCTIONS
Shingrix (new shingles shot; 2 shot series) check at pharmacy  Flu/ COVID vaccines this season      Medicare Wellness  Personal Prevention Plan of Service     Date of Office Visit:    Encounter Provider:  Bryce Morales MD  Place of Service:  [facility]  Patient Name: Bernice Dunlap  :  1955    As part of the Medicare Wellness portion of your visit today, we are providing you with this personalized preventive plan of services (PPPS). This plan is based upon recommendations of the [place] Preventive Services Task Force (USPSTF) and the Advisory Committee on Immunization Practices (ACIP).    This lists the preventive care services that should be considered, and provides dates of when you are due. Items listed as completed are up-to-date and do not require any further intervention.    Health Maintenance   Topic Date Due    ZOSTER VACCINE (1 of 2) Never done    MAMMOGRAM  2017    COLORECTAL CANCER SCREENING  2020    DXA SCAN  2021    COVID-19 Vaccine (3 - Booster for Moderna series) 2021    INFLUENZA VACCINE  10/01/2022    LIPID PANEL  2023    ANNUAL WELLNESS VISIT  2023    Pneumococcal Vaccine 65+ (2 - PPSV23 or PCV20) 10/16/2024    TDAP/TD VACCINES (3 - Td or Tdap) 2032    HEPATITIS C SCREENING  Completed    PAP SMEAR  Discontinued       Orders Placed This Encounter   Procedures    Mammo Screening Bilateral With CAD     Standing Status:   Future     Standing Expiration Date:   2023     Order Specific Question:   Reason for Exam:     Answer:   Screening     Order Specific Question:   Does this patient have a diabetic monitoring/medication delivering device on?     Answer:   No     Order Specific Question:   Release to patient     Answer:   Routine Release    DEXA Bone Density Axial     Order Specific Question:   Is patient taking or have taken long term Glucocorticoid (steroids)?     Answer:   No     Order Specific Question:    Does the patient have rheumatoid arthritis?     Answer:   No     Order Specific Question:   Does the patient have secondary osteoporosis?     Answer:   No     Order Specific Question:   Reason for Exam:     Answer:   osteopenia     Order Specific Question:   Does this patient have a diabetic monitoring/medication delivering device on?     Answer:   No     Order Specific Question:   Release to patient     Answer:   Routine Release    Ambulatory Referral For Screening Colonoscopy     Referral Priority:   Routine     Referral Type:   Diagnostic Medical     Referral Reason:   Specialty Services Required     Referred to Provider:   Bereket Silva MD     Number of Visits Requested:   1       Return in about 6 months (around 3/13/2023) for Recheck.

## 2022-09-16 ENCOUNTER — TRANSCRIBE ORDERS (OUTPATIENT)
Dept: ADMINISTRATIVE | Facility: HOSPITAL | Age: 67
End: 2022-09-16

## 2022-09-16 DIAGNOSIS — Z12.31 ENCOUNTER FOR SCREENING MAMMOGRAM FOR MALIGNANT NEOPLASM OF BREAST: Primary | ICD-10-CM

## 2022-10-27 RX ORDER — CLOPIDOGREL BISULFATE 75 MG/1
75 TABLET ORAL DAILY
Qty: 90 TABLET | Refills: 2 | Status: SHIPPED | OUTPATIENT
Start: 2022-10-27 | End: 2022-12-12 | Stop reason: SDUPTHER

## 2022-11-21 RX ORDER — LOSARTAN POTASSIUM 50 MG/1
50 TABLET ORAL DAILY
Qty: 30 TABLET | Refills: 5 | Status: SHIPPED | OUTPATIENT
Start: 2022-11-21 | End: 2022-12-07 | Stop reason: SDUPTHER

## 2022-11-22 ENCOUNTER — TELEPHONE (OUTPATIENT)
Dept: INTERNAL MEDICINE | Facility: CLINIC | Age: 67
End: 2022-11-22

## 2022-11-22 RX ORDER — AMLODIPINE BESYLATE 5 MG/1
5 TABLET ORAL DAILY
Qty: 90 TABLET | Refills: 3 | Status: SHIPPED | OUTPATIENT
Start: 2022-11-22

## 2022-11-22 NOTE — TELEPHONE ENCOUNTER
Caller: Bernice Dunlap    Relationship: Self    Best call back number: [phone]    Requested Prescriptions:   Requested Prescriptions     Pending Prescriptions Disp Refills   • amLODIPine (NORVASC) 5 MG tablet 90 tablet 3     Sig: Take 1 tablet by mouth Daily.        Pharmacy where request should be sent: [facility] [address] [phone] [facility] [phone] FX     Additional details provided by patient: COMPLETELY OUT OF MEDICATION     Does the patient have less than a 3 day supply:  [x] Yes  [] No    Machelle Hillman Rep   11/22/22 14:19 EST

## 2022-12-07 RX ORDER — LOSARTAN POTASSIUM 50 MG/1
50 TABLET ORAL DAILY
Qty: 90 TABLET | Refills: 2 | Status: SHIPPED | OUTPATIENT
Start: 2022-12-07

## 2022-12-12 DIAGNOSIS — E78.2 MIXED HYPERLIPIDEMIA: ICD-10-CM

## 2022-12-12 DIAGNOSIS — I70.90 ARTERIOSCLEROTIC VASCULAR DISEASE: ICD-10-CM

## 2022-12-12 RX ORDER — CLOPIDOGREL BISULFATE 75 MG/1
75 TABLET ORAL DAILY
Qty: 90 TABLET | Refills: 2 | Status: SHIPPED | OUTPATIENT
Start: 2022-12-12

## 2022-12-12 RX ORDER — ROSUVASTATIN CALCIUM 40 MG/1
40 TABLET, COATED ORAL DAILY
Qty: 90 TABLET | Refills: 2 | Status: SHIPPED | OUTPATIENT
Start: 2022-12-12

## 2022-12-12 NOTE — TELEPHONE ENCOUNTER
Caller: Bernice Dunlap    Relationship: Self    Best call back number: [phone]    Requested Prescriptions:   Requested Prescriptions     Pending Prescriptions Disp Refills   • rosuvastatin (CRESTOR) 40 MG tablet 90 tablet 2     Sig: Take 1 tablet by mouth Daily.   • clopidogrel (PLAVIX) 75 MG tablet 90 tablet 2     Sig: Take 1 tablet by mouth Daily.        Pharmacy where request should be sent: [facility] [address] [phone] [facility] [phone] FX     Additional details provided by patient: PATIENT STATES THAT SHE IS OUT OF HER MEDICATIONS    Does the patient have less than a 3 day supply:  [x] Yes  [] No    Would you like a call back once the refill request has been completed: [x] Yes [] No    If the office needs to give you a call back, can they leave a voicemail: [x] Yes [] No    Machelle Dubois Rep   12/12/22 10:33 EST

## 2023-01-12 ENCOUNTER — OFFICE VISIT (OUTPATIENT)
Dept: CARDIOLOGY | Facility: CLINIC | Age: 68
End: 2023-01-12
Payer: MEDICARE

## 2023-01-12 VITALS
HEIGHT: 62 IN | SYSTOLIC BLOOD PRESSURE: 124 MMHG | DIASTOLIC BLOOD PRESSURE: 66 MMHG | WEIGHT: 152 LBS | HEART RATE: 81 BPM | BODY MASS INDEX: 27.97 KG/M2

## 2023-01-12 DIAGNOSIS — Z95.1 S/P CABG X 1: ICD-10-CM

## 2023-01-12 DIAGNOSIS — E78.2 MIXED HYPERLIPIDEMIA: ICD-10-CM

## 2023-01-12 DIAGNOSIS — I10 ESSENTIAL HYPERTENSION: ICD-10-CM

## 2023-01-12 DIAGNOSIS — I25.10 CORONARY ARTERY DISEASE INVOLVING NATIVE CORONARY ARTERY OF NATIVE HEART WITHOUT ANGINA PECTORIS: Primary | ICD-10-CM

## 2023-01-12 DIAGNOSIS — Z72.0 TOBACCO ABUSE: ICD-10-CM

## 2023-01-12 PROCEDURE — 93000 ELECTROCARDIOGRAM COMPLETE: CPT | Performed by: NURSE PRACTITIONER

## 2023-01-12 PROCEDURE — 99214 OFFICE O/P EST MOD 30 MIN: CPT | Performed by: NURSE PRACTITIONER

## 2023-01-12 RX ORDER — BUPROPION HCL 150 MG
TABLET,SUSTAINED-RELEASE 12 HR ORAL
Qty: 60 TABLET | Refills: 1 | Status: SHIPPED | OUTPATIENT
Start: 2023-01-12 | End: 2023-03-06

## 2023-01-12 RX ORDER — EZETIMIBE 10 MG/1
10 TABLET ORAL DAILY
Qty: 90 TABLET | Refills: 0 | Status: SHIPPED | OUTPATIENT
Start: 2023-01-12

## 2023-01-12 NOTE — PROGRESS NOTES
Date of Office Visit: 2023  Encounter Provider: BILL Morris  Place of Service: [facility] CARDIOLOGY  Patient Name: Bernice Dunlap  :1955    Chief Complaint   Patient presents with   • Coronary Artery Disease   :     HPI: Bernice Dunlap is a 67 y.o. female.  She is a patient of Dr. Contreras's whom we follow for hypertension, hyperlipidemia, carotid artery disease, and coronary artery disease.  In , she had a myocardial infarction and subsequent CABG x 1 in .  In 2019, she underwent drug-eluting stent placement to her mid circumflex.  At that time, she had 50% disease in a small ZOIE but otherwise her coronaries looked okay and her LV function was normal.   She was last seen in the office by Dr. Contreras in 2021 at which time she was doing well.  Her blood pressure was elevated.  Dr. Contreras recommended she watch it closely at home.  Otherwise, she was advised to follow-up in 1 year.   From a symptom standpoint, she has been doing well.  She denies any chest pain, shortness of breath, palpitations, edema, dizziness, syncope, bleeding difficulties or melena.  She is walking every day.  Unfortunately, she has started smoking again.  Reportedly she smokes 1 pack every 2 days.  She is very motivated to quit has been unsuccessful quitting independently.    Past Medical History:   Diagnosis Date   • Arteriosclerosis of carotid artery     1-15% B 2013 U/S --> 16-49% bilaterally in    • Arteriosclerotic cardiovascular disease (ASCVD)     MI ; s/p CABG x 1    • Benign essential hypertension    • BPPV (benign paroxysmal positional vertigo)    • COVID-19 2022   • Fibromyalgia    • History of nephrolithiasis     on L; non-obstructing on  CT; reidentified on X-ray in 2016   • Hydrothorax 2016   • Hyperlipidemia    • Migraine headache    • Myocardial infarction (HCC)     Onset Date:    • Pleural effusion 2016   •  Pre-diabetes    • Stress and adjustment reaction 2016   • Vitamin D deficiency        Past Surgical History:   Procedure Laterality Date   • APPENDECTOMY     • CHOLECYSTECTOMY      Laparoscopic   • CORONARY ANGIOPLASTY  10/2019   • CORONARY ARTERY BYPASS GRAFT      1 vessel bypass. Mammary artery to the LAD; ; nl stress in    • HYSTERECTOMY     • SALPINGO OOPHORECTOMY Bilateral    • THORACOSCOPY      With Pleurodesis (Therapeutic) -  after traumatic effusion; Dr. Tomas       Social History     Socioeconomic History   • Marital status:    • Number of children: 1   Tobacco Use   • Smoking status: Former     Types: Cigarettes     Quit date:      Years since quittin.0   • Smokeless tobacco: Never   • Tobacco comments:     6 pk/yr hx; occasionally smokes; quit 2019   Vaping Use   • Vaping Use: Never used   Substance and Sexual Activity   • Alcohol use: No     Comment: caffeine use minimal   • Drug use: No   • Sexual activity: Not Currently     Partners: Male     Comment: no hx STD's       Family History   Problem Relation Age of Onset   • Heart disease Mother         Arteriosclerotic cardiovascular disease; dx'd in 50's   • Alcohol abuse Father         Alcoholism   • COPD Father    • Depression Sister    • Leukemia Sister    • Diabetes Sister         Type 2 Diabetes Mellitus   • Alcohol abuse Brother         Alcoholism   • Hypertension Brother         Benign Essential Hypertension   • Glaucoma Brother    • Hyperlipidemia Brother    • Prostate cancer Brother    • No Known Problems Maternal Grandmother    • No Known Problems Maternal Grandfather    • No Known Problems Paternal Grandmother    • No Known Problems Paternal Grandfather    • No Known Problems Son        Review of Systems   Constitutional: Negative.   Cardiovascular: Negative.  Negative for chest pain, dyspnea on exertion, leg swelling, orthopnea, paroxysmal nocturnal dyspnea and syncope.   Respiratory: Negative.   "  Hematologic/Lymphatic: Negative for bleeding problem.   Musculoskeletal: Negative for falls.   Gastrointestinal: Negative for melena.   Neurological: Negative for dizziness and light-headedness.       Allergies   Allergen Reactions   • Imitrex [Sumatriptan] Other (See Comments)     MI   • Lisinopril Cough         Current Outpatient Medications:   •  amLODIPine (NORVASC) 5 MG tablet, Take 1 tablet by mouth Daily., Disp: 90 tablet, Rfl: 3  •  Cholecalciferol (HM VITAMIN D3) 2000 units capsule, Take 2,000 Units by mouth Daily., Disp: 30 each, Rfl:   •  clopidogrel (PLAVIX) 75 MG tablet, Take 1 tablet by mouth Daily., Disp: 90 tablet, Rfl: 2  •  ibuprofen (Motrin IB) 200 MG tablet, 3 tabs PO q 8 hours, Disp: 30 tablet, Rfl: 0  •  losartan (COZAAR) 50 MG tablet, Take 1 tablet by mouth Daily., Disp: 90 tablet, Rfl: 2  •  metoprolol succinate XL (TOPROL-XL) 50 MG 24 hr tablet, TAKE 1 TABLET BY MOUTH DAILY, Disp: 90 tablet, Rfl: 2  •  pantoprazole (PROTONIX) 40 MG EC tablet, Take 1 tablet by mouth once daily, Disp: 30 tablet, Rfl: 0  •  psyllium (METAMUCIL) 58.6 % powder, Take  by mouth Daily., Disp: , Rfl: 12  •  rosuvastatin (CRESTOR) 40 MG tablet, Take 1 tablet by mouth Daily., Disp: 90 tablet, Rfl: 2  •  traZODone (DESYREL) 50 MG tablet, Take 1 tablet by mouth every night at bedtime., Disp: 30 tablet, Rfl: 4  •  ezetimibe (ZETIA) 10 MG tablet, Take 1 tablet by mouth Daily., Disp: 90 tablet, Rfl: 0  •  Wellbutrin  MG 12 hr tablet, 150 daily x 3 times followed by 150 mg twice daily, Disp: 60 tablet, Rfl: 1      Objective:     Vitals:    01/12/23 1028   BP: 124/66   Pulse: 81   Weight: 68.9 kg (152 lb)   Height: 157.5 cm (62\")     Body mass index is 27.8 kg/m².    PHYSICAL EXAM:    Neck:      Vascular: No JVD.   Pulmonary:      Effort: Pulmonary effort is normal.      Breath sounds: Normal breath sounds.   Cardiovascular:      Normal rate. Regular rhythm.      Murmurs: There is no murmur.      No gallop. No click. " No rub.   Pulses:     Intact distal pulses.           ECG 12 Lead    Date/Time: 1/12/2023 10:38 AM  Performed by: Mariya Chappell APRN  Authorized by: Mariya Chappell APRN   Comparison: compared with previous ECG from 6/1/2022  Similar to previous ECG  Rhythm: sinus rhythm  Rate: normal  BPM: 81  T inversion: V2, V1 and aVL                Assessment:       Diagnosis Plan   1. Coronary artery disease involving native coronary artery of native heart without angina pectoris  ECG 12 Lead      2. S/P CABG x 1        3. Essential hypertension        4. Mixed hyperlipidemia        5. Tobacco abuse          Orders Placed This Encounter   Procedures   • ECG 12 Lead     This order was created via procedure documentation     Order Specific Question:   Release to patient     Answer:   Routine Release          Plan:       1.  Coronary artery disease.  Status post CABG x 1 in 2008.  In 2019, she underwent PCI of the mid circumflex.  She denies any symptoms of angina.  She is on good medical therapy including clopidogrel and rosuvastatin.        2.  Hypertension.  Her blood pressure is stable.  Continue current therapy.        3.  Hyperlipidemia.  Lipid panel from September 2022 demonstrated an LDL of 117 and HDL of 49.  I recommended the addition of Zetia to achieve an LDL less than 70.  She will need a repeat lipid panel in 3 months.      4.  Bernice Dunlap  reports that she quit smoking about 4 years ago. She has never used smokeless tobacco.. I have educated her on the risk of diseases from using tobacco products such as cancer, COPD and heart disease.     I advised her to quit and she is willing to quit. We have discussed the following method/s for tobacco cessation:  Counseling Prescription Medicaiton.  Together we have set a quit date for 2 weeks from today.  She will follow up with me in 1 month or sooner to check on her progress.    I spent 5 minutes counseling the patient.       Overall I think she is doing  well.  I will see her back in 1 month.      As always, it has been a pleasure to participate in your patient's care.      Sincerely,         BILL Storey

## 2023-02-09 DIAGNOSIS — I10 BENIGN ESSENTIAL HYPERTENSION: ICD-10-CM

## 2023-02-09 NOTE — TELEPHONE ENCOUNTER
Caller: Bernice Dunlap    Relationship: Self    Best call back number: [phone]    Requested Prescriptions:   Requested Prescriptions     Pending Prescriptions Disp Refills   • metoprolol succinate XL (TOPROL-XL) 50 MG 24 hr tablet 90 tablet 2     Sig: Take 1 tablet by mouth Daily.        Pharmacy where request should be sent: [facility] [address] [phone] [facility] [phone] FX     Additional details provided by patient: PATIENT STATES THAT SHE IS OUT OF MEDICATION    Does the patient have less than a 3 day supply:  [x] Yes  [] No    Would you like a call back once the refill request has been completed: [] Yes [x] No    If the office needs to give you a call back, can they leave a voicemail: [] Yes [x] No    Machelle Dubois Rep   02/09/23 10:29 EST

## 2023-02-10 RX ORDER — METOPROLOL SUCCINATE 50 MG/1
50 TABLET, EXTENDED RELEASE ORAL DAILY
Qty: 90 TABLET | Refills: 2 | Status: SHIPPED | OUTPATIENT
Start: 2023-02-10

## 2023-02-23 ENCOUNTER — OFFICE VISIT (OUTPATIENT)
Dept: CARDIOLOGY | Facility: CLINIC | Age: 68
End: 2023-02-23
Payer: MEDICARE

## 2023-02-23 VITALS
SYSTOLIC BLOOD PRESSURE: 112 MMHG | WEIGHT: 152.4 LBS | DIASTOLIC BLOOD PRESSURE: 78 MMHG | HEIGHT: 62 IN | BODY MASS INDEX: 28.05 KG/M2 | OXYGEN SATURATION: 97 % | HEART RATE: 74 BPM

## 2023-02-23 DIAGNOSIS — Z72.0 TOBACCO ABUSE: ICD-10-CM

## 2023-02-23 DIAGNOSIS — I25.10 CORONARY ARTERY DISEASE INVOLVING NATIVE CORONARY ARTERY OF NATIVE HEART WITHOUT ANGINA PECTORIS: Primary | ICD-10-CM

## 2023-02-23 PROCEDURE — 99214 OFFICE O/P EST MOD 30 MIN: CPT | Performed by: NURSE PRACTITIONER

## 2023-02-23 PROCEDURE — 93000 ELECTROCARDIOGRAM COMPLETE: CPT | Performed by: NURSE PRACTITIONER

## 2023-02-23 NOTE — PROGRESS NOTES
Date of Office Visit: 2023  Encounter Provider: BILL Morris  Place of Service: [facility] CARDIOLOGY  Patient Name: Bernice Dunlap  :1955    Chief Complaint   Patient presents with   • Nicotine Dependence   :     HPI: Bernice Dunlap is a 67 y.o. female.  She is a patient of Dr. Contreras's whom we follow for hypertension, hyperlipidemia, carotid artery disease, and coronary artery disease.  In , she had a myocardial infarction and subsequent CABG x 1 in .  In 2019, she underwent drug-eluting stent placement to her mid circumflex.  At that time, she had 50% disease in a small ZOIE but otherwise her coronaries looked okay and her LV function was normal.   I last saw her in the office in January at which time she was overall doing well.  Unfortunately, she had started smoking again.  Ultimately, she agreed to start Wellbutrin to aid in her cessation.  She was advised to follow-up in 1 month.   Fortunately, she has successfully quit smoking.  In fact, she was able to quit after 3 days on the medication.  She denies any chest pain, shortness of breath, palpitations, edema, dizziness, or syncope.    Past Medical History:   Diagnosis Date   • Arteriosclerosis of carotid artery     1-15% B 2013 U/S --> 16-49% bilaterally in    • Arteriosclerotic cardiovascular disease (ASCVD)     MI ; s/p CABG x 1    • Benign essential hypertension    • BPPV (benign paroxysmal positional vertigo)    • COVID-19 2022   • Fibromyalgia    • History of nephrolithiasis     on L; non-obstructing on  CT; reidentified on X-ray in    • Hydrothorax 2016   • Hyperlipidemia    • Migraine headache    • Myocardial infarction (HCC)     Onset Date:    • Pleural effusion 2016   • Pre-diabetes    • Stress and adjustment reaction 2016   • Vitamin D deficiency        Past Surgical History:   Procedure Laterality Date   • APPENDECTOMY     •  CHOLECYSTECTOMY      Laparoscopic   • CORONARY ANGIOPLASTY  10/2019   • CORONARY ARTERY BYPASS GRAFT      1 vessel bypass. Mammary artery to the LAD; ; nl stress in    • HYSTERECTOMY     • SALPINGO OOPHORECTOMY Bilateral    • THORACOSCOPY      With Pleurodesis (Therapeutic) -  after traumatic effusion; Dr. Tomas       Social History     Socioeconomic History   • Marital status:    • Number of children: 1   Tobacco Use   • Smoking status: Former     Types: Cigarettes     Quit date: 2019     Years since quittin.1   • Smokeless tobacco: Never   • Tobacco comments:     6 pk/yr hx; occasionally smokes; quit 2019   Vaping Use   • Vaping Use: Never used   Substance and Sexual Activity   • Alcohol use: No     Comment: caffeine use minimal   • Drug use: No   • Sexual activity: Not Currently     Partners: Male     Comment: no hx STD's       Family History   Problem Relation Age of Onset   • Heart disease Mother         Arteriosclerotic cardiovascular disease; dx'd in 50's   • Alcohol abuse Father         Alcoholism   • COPD Father    • Depression Sister    • Leukemia Sister    • Diabetes Sister         Type 2 Diabetes Mellitus   • Alcohol abuse Brother         Alcoholism   • Hypertension Brother         Benign Essential Hypertension   • Glaucoma Brother    • Hyperlipidemia Brother    • Prostate cancer Brother    • No Known Problems Maternal Grandmother    • No Known Problems Maternal Grandfather    • No Known Problems Paternal Grandmother    • No Known Problems Paternal Grandfather    • No Known Problems Son        Review of Systems   Constitutional: Negative.   Cardiovascular: Negative.  Negative for chest pain, dyspnea on exertion, leg swelling, orthopnea, paroxysmal nocturnal dyspnea and syncope.   Respiratory: Negative.    Hematologic/Lymphatic: Negative for bleeding problem.   Musculoskeletal: Negative for falls.   Gastrointestinal: Negative for melena.   Neurological: Negative for dizziness and  "light-headedness.       Allergies   Allergen Reactions   • Imitrex [Sumatriptan] Other (See Comments)     MI   • Lisinopril Cough         Current Outpatient Medications:   •  amLODIPine (NORVASC) 5 MG tablet, Take 1 tablet by mouth Daily., Disp: 90 tablet, Rfl: 3  •  Cholecalciferol (HM VITAMIN D3) 2000 units capsule, Take 2,000 Units by mouth Daily., Disp: 30 each, Rfl:   •  clopidogrel (PLAVIX) 75 MG tablet, Take 1 tablet by mouth Daily., Disp: 90 tablet, Rfl: 2  •  ezetimibe (ZETIA) 10 MG tablet, Take 1 tablet by mouth Daily., Disp: 90 tablet, Rfl: 0  •  losartan (COZAAR) 50 MG tablet, Take 1 tablet by mouth Daily., Disp: 90 tablet, Rfl: 2  •  metoprolol succinate XL (TOPROL-XL) 50 MG 24 hr tablet, Take 1 tablet by mouth Daily., Disp: 90 tablet, Rfl: 2  •  rosuvastatin (CRESTOR) 40 MG tablet, Take 1 tablet by mouth Daily., Disp: 90 tablet, Rfl: 2  •  traZODone (DESYREL) 50 MG tablet, Take 1 tablet by mouth every night at bedtime., Disp: 30 tablet, Rfl: 4  •  Wellbutrin  MG 12 hr tablet, 150 daily x 3 times followed by 150 mg twice daily, Disp: 60 tablet, Rfl: 1      Objective:     Vitals:    02/23/23 1202   BP: 112/78   Pulse: 74   SpO2: 97%   Weight: 69.1 kg (152 lb 6.4 oz)   Height: 157.5 cm (62\")     Body mass index is 27.87 kg/m².    PHYSICAL EXAM:    Neck:      Vascular: No JVD.   Pulmonary:      Effort: Pulmonary effort is normal.      Breath sounds: Normal breath sounds.   Cardiovascular:      Normal rate. Regular rhythm.      Murmurs: There is no murmur.      No gallop. No click. No rub.   Pulses:     Intact distal pulses.           ECG 12 Lead    Date/Time: 2/23/2023 12:06 PM  Performed by: Mariya Chappell APRN  Authorized by: Mariya Chappell APRN   Comparison: compared with previous ECG from 1/12/2023  Similar to previous ECG  Rhythm: sinus rhythm  Rate: normal  BPM: 74  T inversion: V2 and aVL                Assessment:       Diagnosis Plan   1. Coronary artery disease involving native " coronary artery of native heart without angina pectoris  ECG 12 Lead      2. Tobacco abuse          Orders Placed This Encounter   Procedures   • ECG 12 Lead     This order was created via procedure documentation     Order Specific Question:   Release to patient     Answer:   Routine Release          Plan:       1.  Coronary artery disease.  Status post CABG x 1 in 2008.  In 2019, she underwent PCI of the mid circumflex.  She denies any symptoms of angina.  She is on good medical therapy including clopidogrel and rosuvastatin.      2.  Tobacco abuse.  She has successfully stop smoking with Wellbutrin.  This is fantastic.      I think she is doing great.  I am not recommending any changes, and she will follow-up with Dr. Contreras in 1 year.      As always, it has been a pleasure to participate in your patient's care.      Sincerely,         BILL Storey

## 2023-03-06 RX ORDER — BUPROPION HYDROCHLORIDE 150 MG/1
TABLET, EXTENDED RELEASE ORAL
Qty: 60 TABLET | Refills: 1 | Status: SHIPPED | OUTPATIENT
Start: 2023-03-06

## 2023-03-10 ENCOUNTER — TELEPHONE (OUTPATIENT)
Dept: INTERNAL MEDICINE | Facility: CLINIC | Age: 68
End: 2023-03-10

## 2023-03-10 DIAGNOSIS — I10 ESSENTIAL HYPERTENSION: ICD-10-CM

## 2023-03-10 DIAGNOSIS — R73.03 PRE-DIABETES: ICD-10-CM

## 2023-03-10 DIAGNOSIS — E78.2 MIXED HYPERLIPIDEMIA: Primary | ICD-10-CM

## 2023-03-10 NOTE — TELEPHONE ENCOUNTER
Caller: Bernice Dunlap    Relationship: Self    Best call back number: [phone]    What orders are you requesting (i.e. lab or imaging): LABS    In what timeframe would the patient need to come in: ASAP    [facility]    Additional notes: PATIENT STATED THAT SHE HAS LABS NEXT WEEK, BUT SHE WOULD RATHER NOT MAKE 2 TRIPS TO THE OFFICE WITHIN A WEEK DUE TO DISTANCE.    SHE WOULD LIKE FOR THE LAB ORDERS TO BE SENT TO A LAB NEARBY HER.    FAX NUMBER- [phone]    SHE WOULD LIKE A CALL IF THE ORDERS HAVE BEEN SENT.

## 2023-03-27 ENCOUNTER — HOSPITAL ENCOUNTER (OUTPATIENT)
Dept: BONE DENSITY | Facility: HOSPITAL | Age: 68
Discharge: HOME | End: 2023-03-27
Payer: MEDICARE

## 2023-04-13 ENCOUNTER — TELEPHONE (OUTPATIENT)
Dept: CARDIOLOGY | Facility: CLINIC | Age: 68
End: 2023-04-13
Payer: MEDICARE

## 2023-04-13 DIAGNOSIS — E78.2 MIXED HYPERLIPIDEMIA: Primary | ICD-10-CM

## 2023-05-05 DIAGNOSIS — G47.09 OTHER INSOMNIA: ICD-10-CM

## 2023-05-05 RX ORDER — TRAZODONE HYDROCHLORIDE 50 MG/1
50 TABLET ORAL
Qty: 30 TABLET | Refills: 4 | Status: SHIPPED | OUTPATIENT
Start: 2023-05-05

## 2023-05-05 NOTE — TELEPHONE ENCOUNTER
.    Caller: Bernice Dunlap    Relationship: Self    Best call back number:    [phone]        Requested Prescriptions:   Requested Prescriptions     Pending Prescriptions Disp Refills   • traZODone (DESYREL) 50 MG tablet 30 tablet 4     Sig: Take 1 tablet by mouth every night at bedtime.        Pharmacy where request should be sent: [facility] [address] [phone] [facility] [phone] FX     Last office visit with prescribing clinician: 9/13/2022   Last telemedicine visit with prescribing clinician: Visit date not found   Next office visit with prescribing clinician: Visit date not found     Additional details provided by patient:     Does the patient have less than a 3 day supply:  [x] Yes  [] No    Would you like a call back once the refill request has been completed: [] Yes [] No    If the office needs to give you a call back, can they leave a voicemail: [] Yes [] No    Machelle Bliss Rep   05/05/23 15:47 EDT

## 2023-06-15 RX ORDER — LOSARTAN POTASSIUM 50 MG/1
50 TABLET ORAL DAILY
Qty: 90 TABLET | Refills: 2 | Status: SHIPPED | OUTPATIENT
Start: 2023-06-15

## 2023-06-15 RX ORDER — EZETIMIBE 10 MG/1
TABLET ORAL
Qty: 90 TABLET | Refills: 0 | Status: SHIPPED | OUTPATIENT
Start: 2023-06-15

## 2023-06-15 NOTE — TELEPHONE ENCOUNTER
Caller: Bernice Dunlap    Relationship: Self    Best call back number:[phone]     Requested Prescriptions:   Requested Prescriptions     Pending Prescriptions Disp Refills    losartan (COZAAR) 50 MG tablet 90 tablet 2     Sig: Take 1 tablet by mouth Daily.        Pharmacy where request should be sent: [facility] [address] [place] [address] [phone] [facility] [phone] FX     Last office visit with prescribing clinician: 9/13/2022   Last telemedicine visit with prescribing clinician: Visit date not found   Next office visit with prescribing clinician: Visit date not found     Additional details provided by patient: PATIENT IS OUT OF MEDICATION     Does the patient have less than a 3 day supply:  [x] Yes  [] No    Would you like a call back once the refill request has been completed: [] Yes [x] No    If the office needs to give you a call back, can they leave a voicemail: [] Yes [x] No    Machelle De León Rep   06/15/23 11:44 EDT

## 2023-09-07 DIAGNOSIS — I10 BENIGN ESSENTIAL HYPERTENSION: ICD-10-CM

## 2023-09-07 RX ORDER — CLOPIDOGREL BISULFATE 75 MG/1
75 TABLET ORAL DAILY
Qty: 90 TABLET | Refills: 2 | Status: SHIPPED | OUTPATIENT
Start: 2023-09-07

## 2023-09-07 RX ORDER — METOPROLOL SUCCINATE 50 MG/1
50 TABLET, EXTENDED RELEASE ORAL DAILY
Qty: 90 TABLET | Refills: 2 | Status: SHIPPED | OUTPATIENT
Start: 2023-09-07

## 2023-09-12 RX ORDER — EZETIMIBE 10 MG/1
TABLET ORAL
Qty: 90 TABLET | Refills: 0 | OUTPATIENT
Start: 2023-09-12

## 2023-11-03 ENCOUNTER — DOCUMENTATION (OUTPATIENT)
Dept: INTERNAL MEDICINE | Facility: CLINIC | Age: 68
End: 2023-11-03
Payer: MEDICARE

## 2023-11-13 ENCOUNTER — OFFICE VISIT (OUTPATIENT)
Dept: INTERNAL MEDICINE | Facility: CLINIC | Age: 68
End: 2023-11-13
Payer: MEDICARE

## 2023-11-13 VITALS
SYSTOLIC BLOOD PRESSURE: 114 MMHG | HEIGHT: 62 IN | BODY MASS INDEX: 28.03 KG/M2 | WEIGHT: 152.3 LBS | DIASTOLIC BLOOD PRESSURE: 66 MMHG | HEART RATE: 69 BPM | TEMPERATURE: 98 F | OXYGEN SATURATION: 99 %

## 2023-11-13 DIAGNOSIS — E78.2 MIXED HYPERLIPIDEMIA: ICD-10-CM

## 2023-11-13 DIAGNOSIS — R73.03 PRE-DIABETES: ICD-10-CM

## 2023-11-13 DIAGNOSIS — I25.10 CORONARY ARTERY DISEASE INVOLVING NATIVE CORONARY ARTERY OF NATIVE HEART WITHOUT ANGINA PECTORIS: ICD-10-CM

## 2023-11-13 DIAGNOSIS — M79.604 RIGHT LEG PAIN: ICD-10-CM

## 2023-11-13 DIAGNOSIS — I70.90 ARTERIOSCLEROTIC VASCULAR DISEASE: Primary | ICD-10-CM

## 2023-11-13 DIAGNOSIS — Z95.1 S/P CABG X 1: ICD-10-CM

## 2023-11-13 DIAGNOSIS — Z23 NEEDS FLU SHOT: ICD-10-CM

## 2023-11-13 DIAGNOSIS — G47.09 OTHER INSOMNIA: ICD-10-CM

## 2023-11-13 DIAGNOSIS — I70.1 RAS (RENAL ARTERY STENOSIS): ICD-10-CM

## 2023-11-13 DIAGNOSIS — I65.23 BILATERAL CAROTID ARTERY STENOSIS: ICD-10-CM

## 2023-11-13 DIAGNOSIS — I10 ESSENTIAL HYPERTENSION: ICD-10-CM

## 2023-11-13 DIAGNOSIS — Z23 NEED FOR COVID-19 VACCINE: ICD-10-CM

## 2023-11-13 LAB
CHOLEST SERPL-MCNC: 133 MG/DL (ref 0–200)
CK SERPL-CCNC: 38 U/L (ref 20–180)
HBA1C MFR BLD: 5.9 % (ref 4.8–5.6)
HDLC SERPL-MCNC: 52 MG/DL (ref 40–60)
LDLC SERPL CALC-MCNC: 68 MG/DL (ref 0–100)
LDLC/HDLC SERPL: 1.32 {RATIO}
TRIGL SERPL-MCNC: 61 MG/DL (ref 0–150)
VLDLC SERPL CALC-MCNC: 13 MG/DL (ref 5–40)

## 2023-11-13 PROCEDURE — G0008 ADMIN INFLUENZA VIRUS VAC: HCPCS | Performed by: INTERNAL MEDICINE

## 2023-11-13 PROCEDURE — 1160F RVW MEDS BY RX/DR IN RCRD: CPT | Performed by: INTERNAL MEDICINE

## 2023-11-13 PROCEDURE — 91320 SARSCV2 VAC 30MCG TRS-SUC IM: CPT | Performed by: INTERNAL MEDICINE

## 2023-11-13 PROCEDURE — 99214 OFFICE O/P EST MOD 30 MIN: CPT | Performed by: INTERNAL MEDICINE

## 2023-11-13 PROCEDURE — 90662 IIV NO PRSV INCREASED AG IM: CPT | Performed by: INTERNAL MEDICINE

## 2023-11-13 PROCEDURE — 1159F MED LIST DOCD IN RCRD: CPT | Performed by: INTERNAL MEDICINE

## 2023-11-13 PROCEDURE — 90480 ADMN SARSCOV2 VAC 1/ONLY CMP: CPT | Performed by: INTERNAL MEDICINE

## 2023-11-13 PROCEDURE — 3074F SYST BP LT 130 MM HG: CPT | Performed by: INTERNAL MEDICINE

## 2023-11-13 PROCEDURE — 3078F DIAST BP <80 MM HG: CPT | Performed by: INTERNAL MEDICINE

## 2023-11-13 RX ORDER — CLOPIDOGREL BISULFATE 75 MG/1
75 TABLET ORAL DAILY
Qty: 90 TABLET | Refills: 2 | Status: SHIPPED | OUTPATIENT
Start: 2023-11-13

## 2023-11-13 RX ORDER — TRAZODONE HYDROCHLORIDE 50 MG/1
50 TABLET ORAL
Qty: 30 TABLET | Refills: 2 | Status: SHIPPED | OUTPATIENT
Start: 2023-11-13

## 2023-11-13 RX ORDER — EZETIMIBE 10 MG/1
10 TABLET ORAL DAILY
Qty: 90 TABLET | Refills: 2 | Status: SHIPPED | OUTPATIENT
Start: 2023-11-13

## 2023-11-13 NOTE — PROGRESS NOTES
"Med Refill (Trazadone, Plavix, )      HPI  Bernice Dunlap is a 68 y.o. female RTC in f/u: has been doing well.  Has moved back to [place] but plans to keep appt here.  Has seen Cards and ED in last month or so.  Stress test negative.  Had some cough,is getting better.    Notes has some R leg pain, started suddenly one day last week. Was severe enough has to go to ED. 'Dull achy pain that would not  go away'.  Was both activity and rest, equal with both. Pain from hip all the way down to top of ankle. No TTP. No swelling.  No weakness. No back or buttock pain.   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      1. HTN - on three drugs. Numbers have been good at home.  ~120/70 most days.   2. Pre-DM -  weight is stable.  \"I am good\" with diet and exercise. Walking 'mostly... a lot of walking''.  Diet is 'not very good'. Is not getting fruits and vegetables daily now.  Trying to reduce beef and meat intake, mostly fish and chicken when does.  Is cooking for a job, at care facility.  Will eat there sometime. 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   3. Insomnia - variable over time.  Trazodone works well, trouble sleeping off med with admitted worry issues over family.  Mood overall OK otherwise. \"Yes, yes'.  Feels better back in [facility].   4. ASCVD s/p CABG in 2008 and (-) stress in 2016 and 11/2018; inpt admit 10/2019 with (+) stress test and new mid RCA lesion  S/p PTCA x1 - Asx'ic.  Off Brilinta, on clopidogrel only. Saw Cards recently in [facility] and had stress test that was negative.    5.  - wants to get Flu/ COVID booster today; not had RSV, is aware.      Review of Systems   Constitutional: Negative for chills and fever.   Cardiovascular:  Positive for leg swelling (subtle, on R, noted in last few weeks along with leg pain issues). Negative for chest pain, dyspnea on exertion, near-syncope and syncope.   Respiratory:  Positive for cough " "(improving, recent URI). Negative for shortness of breath.    Musculoskeletal:  Positive for myalgias (R leg, whole leg). Negative for back pain, joint pain (over R knee or ankle) and muscle weakness.   Neurological:  Negative for dizziness, focal weakness, light-headedness, numbness and paresthesias.       The following portions of the patient's history were reviewed and updated as appropriate: allergies, current medications, past medical history, past social history, and problem list.      Current Outpatient Medications:     amLODIPine (NORVASC) 5 MG tablet, Take 1 tablet by mouth Daily., Disp: 90 tablet, Rfl: 3    buPROPion SR (WELLBUTRIN SR) 150 MG 12 hr tablet, TAKE 1 TABLET BY MOUTH DAILY FOR 3 DAYS THEN INCREASE TO 1 TABLET TWICE DAILY THEREAFTER, Disp: 60 tablet, Rfl: 1    Cholecalciferol (HM VITAMIN D3) 2000 units capsule, Take 2,000 Units by mouth Daily., Disp: 30 each, Rfl:     clopidogrel (PLAVIX) 75 MG tablet, Take 1 tablet by mouth Daily., Disp: 90 tablet, Rfl: 2    ezetimibe (ZETIA) 10 MG tablet, Take 1 tablet by mouth Daily., Disp: 90 tablet, Rfl: 2    losartan (COZAAR) 50 MG tablet, Take 1 tablet by mouth Daily., Disp: 90 tablet, Rfl: 2    metoprolol succinate XL (TOPROL-XL) 50 MG 24 hr tablet, Take 1 tablet by mouth Daily., Disp: 90 tablet, Rfl: 2    rosuvastatin (CRESTOR) 40 MG tablet, Take 1 tablet by mouth Daily., Disp: 90 tablet, Rfl: 2    traZODone (DESYREL) 50 MG tablet, Take 1 tablet by mouth every night at bedtime., Disp: 30 tablet, Rfl: 2    Vitals:    11/13/23 0753   BP: 114/66   Pulse: 69   Temp: 98 °F (36.7 °C)   SpO2: 99%   Weight: 69.1 kg (152 lb 4.8 oz)   Height: 157.5 cm (62.01\")     Body mass index is 27.85 kg/m².      Physical Exam  Vitals reviewed.   Constitutional:       General: She is not in acute distress.     Appearance: Normal appearance. She is well-developed and normal weight. She is not ill-appearing or toxic-appearing.   HENT:      Head: Normocephalic and atraumatic.    "   Mouth/Throat:      Mouth: Mucous membranes are moist.      Pharynx: Oropharynx is clear. No oropharyngeal exudate.   Eyes:      General: No scleral icterus.     Conjunctiva/sclera: Conjunctivae normal.      Pupils: Pupils are equal, round, and reactive to light.   Neck:      Vascular: No carotid bruit.   Cardiovascular:      Rate and Rhythm: Normal rate and regular rhythm.      Pulses:           Radial pulses are 2+ on the right side and 2+ on the left side.        Dorsalis pedis pulses are 2+ on the right side and 2+ on the left side.        Posterior tibial pulses are 2+ on the right side and 2+ on the left side.      Heart sounds: No murmur heard.     No friction rub. No gallop.   Pulmonary:      Effort: Pulmonary effort is normal. No respiratory distress.      Breath sounds: No wheezing, rhonchi or rales.   Abdominal:      General: Abdomen is flat. There is no distension.      Palpations: Abdomen is soft.      Tenderness: There is no abdominal tenderness.   Musculoskeletal:      Cervical back: Normal range of motion and neck supple. No tenderness.      Lumbar back: No swelling, deformity, tenderness or bony tenderness. Normal range of motion. Negative right straight leg raise test and negative left straight leg raise test.      Right hip: Normal range of motion. Normal strength.      Left hip: Normal range of motion. Normal strength.      Right knee: No swelling or bony tenderness. Normal range of motion. No tenderness.      Left knee: No swelling. Normal range of motion.      Right lower le+ Edema (at ankle only, no pitting) present.      Left lower leg: No edema.      Right foot: Normal range of motion. No swelling or bunion.      Left foot: Normal range of motion. No swelling or bunion.   Feet:      Right foot:      Skin integrity: No ulcer, blister, skin breakdown, dry skin or fissure.      Left foot:      Skin integrity: No ulcer, blister, skin breakdown, dry skin or fissure.   Lymphadenopathy:       Cervical: No cervical adenopathy.   Neurological:      General: No focal deficit present.      Mental Status: She is alert and oriented to person, place, and time.      Cranial Nerves: No cranial nerve deficit, dysarthria or facial asymmetry.      Motor: No weakness, atrophy or abnormal muscle tone.      Gait: Gait normal.      Deep Tendon Reflexes:      Reflex Scores:       Patellar reflexes are 1+ on the right side and 1+ on the left side.       Achilles reflexes are 1+ on the right side and 1+ on the left side.  Psychiatric:         Mood and Affect: Mood normal.         Behavior: Behavior normal.         Thought Content: Thought content normal.         CT angio A/P 11/2023:   PROCEDURE: CT ABDOMINAL AORTA ANGIO BILATERAL RUNOFF WITH IV CONTRAST   INDICATION:  Acute aortic syndrome (AAS) suspected   COMPARISON: None   TECHNIQUE:   CT angiography of the abdomen, pelvis and lower extremities was performed with intravenous contrast. 3D reconstruction of the arterial vasculature from the source images.   This exam was performed using radiation doses that are as low as reasonably achievable (ALARA) according to our departmental dose optimization program, which includes automated exposure control, adjustment of the mA and/or KV according to patient size   and/or use of iterative reconstruction technique.   FINDINGS:   Vascular:   Aorta: Calcified and noncalcified atherosclerotic plaque.   Celiac: Patent   Superior mesenteric: Patent   Right renal artery: Moderate stenosis   Left renal artery: Mild stenosis   Inferior mesenteric: Patent   Right iliac: Mild stenosis   Left iliac: Mild stenosis   Right lower extremity:   Common femoral: Patent   Femoral: Patent   Popliteal: Patent   Tibioperoneal trunk: Patent   Anterior tibial artery: Patent   Posterior tibial artery: Patent   Peroneal artery: Patent   Left lower extremity:   Common femoral: Patent   Femoral: Patent   Popliteal: Patent   Tibioperoneal trunk: Patent    Anterior tibial artery: Patent   Posterior tibial artery: Patent   Peroneal artery: Patent   Other:   Liver: Normal.   Gallbladder and biliary tree: Absent. Intrahepatic biliary dilatation   Spleen: Normal.   Pancreas: Normal.   Adrenals: Normal.   Kidneys: Left renal calculus   Adenopathy: None.   Bowel: Scattered colonic diverticula. Mild rectal wall thickening though nondistended   Reproductive organs: No pelvic masses.   Urinary bladder: No bladder calculi   Peritoneum: No ascites or free air.   Abdominal wall: Normal.   Bones: Old right rib fractures. Right subtalar coalition   Lung bases: Clear.       XR lumbar: R leg pain; no prior for comparison available  No fracture  Normal alignment   Minimal DDD noted in lumbar spine  Facet joints intact with mild spondylosis only      Assessment/ Plan  Diagnoses and all orders for this visit:    Arteriosclerotic vascular disease  -     Lipid Panel With LDL / HDL Ratio    Other insomnia  -     traZODone (DESYREL) 50 MG tablet; Take 1 tablet by mouth every night at bedtime.    Bilateral carotid artery stenosis  -     Lipid Panel With LDL / HDL Ratio    Coronary artery disease involving native coronary artery of native heart without angina pectoris    Essential hypertension    Mixed hyperlipidemia  -     Lipid Panel With LDL / HDL Ratio    Pre-diabetes  -     Hemoglobin A1c    S/P CABG x 1  -     Lipid Panel With LDL / HDL Ratio    Right leg pain  -     XR Spine Lumbar Complete 4+VW  -     CK    Needs flu shot  -     Fluzone High-Dose 65+yrs    Need for COVID-19 vaccine  -     COVID-19 F23 (Pfizer) 12yrs+ (COMIRNATY)    LANDON (renal artery stenosis)    Other orders  -     clopidogrel (PLAVIX) 75 MG tablet; Take 1 tablet by mouth Daily.  -     ezetimibe (ZETIA) 10 MG tablet; Take 1 tablet by mouth Daily.        Return in about 4 months (around 3/13/2024) for Annual physical, Medicare Wellness.      Discussion:  Bernice Dunlap is a 68 y.o. female RTC in f/u:  1. R leg  pain, subacute - new issue to me today, s/p eval in ED 11/2023 with CT angio that showed only mild Iliac artery stenosis and (-) Duplex for DVT.  No radicular sx, pt clear that feels different from distant past events of radiculopathy. Exam non focal other than trace non-pitting swelling at R ankle. XR lumbar in office today with only minimal degenerative changes. Had good d/w pt today about my suspicion that her sx are venous insufficiency related, provisional dx.  Pt has new job working on feet and has R lower leg mild swelling.  She also had FM (Fibromyalgia was long term controlled on Lyrica, off due to blurred vision) that, as in past, can amplify pt pain perception, which I suspect is case here with venous related pain. Compression sock Rx given to pt and asked to wear while at work/ on feet.  Ddx includes radicular sx from low back, but hold on addt'l w/u today as thought less likely.  Check CK today.  2. HTN; new dx of LANDON (mod on R and mild on L) on CT angio in ED 11/2023 - controlled on three drugs. Good home log. Cr OK despite ARB use/ lytes OK.  C/W same.   3. Pre-DM - weight is stable, remains active.  Diet is still not ideal, but urged pt to make TLC diet changes. Trend A1C today.    4. Insomnia - variable over time.  Trazodone working well.  Mood overall OK.   5. ASCVD s/p CABG in 2008 and (-) stress in 2016 and 11/2018; inpt admit 10/2019 with (+) stress test and new mid RCA lesion  S/p PTCA x1 - Asx'ic.  Off Brilinta, on clopidogrel only. S/P recent eval with Cards in [facility], [place], stress test that negative (reviewed today).  C/W Statin, ARB, B-blocker, clopidogrel. Trend lipids today. F/U Cards as planned.  6. HM -  Flu/ COVID booster today.     RTC 4 months CPE/ AWV, F labs prior

## 2023-11-15 ENCOUNTER — TELEPHONE (OUTPATIENT)
Dept: INTERNAL MEDICINE | Facility: CLINIC | Age: 68
End: 2023-11-15
Payer: MEDICARE

## 2023-11-15 NOTE — TELEPHONE ENCOUNTER
Pt informed    ----- Message from Bryce Morales MD sent at 11/14/2023  5:17 PM EST -----  Results called to pt 11/15/23  A1c is progressive on check.  TLC diet mods as we discussed for improved blood sugar profile.  Cholesterol numbers look good on statin.  Muscle enzymes normal.  Continue with same.

## 2023-12-22 ENCOUNTER — TELEPHONE (OUTPATIENT)
Dept: INTERNAL MEDICINE | Facility: CLINIC | Age: 68
End: 2023-12-22
Payer: MEDICARE

## 2023-12-22 DIAGNOSIS — I70.90 ARTERIOSCLEROTIC VASCULAR DISEASE: ICD-10-CM

## 2023-12-22 DIAGNOSIS — E78.2 MIXED HYPERLIPIDEMIA: ICD-10-CM

## 2023-12-22 RX ORDER — AMLODIPINE BESYLATE 5 MG/1
5 TABLET ORAL DAILY
Qty: 90 TABLET | Refills: 3 | Status: SHIPPED | OUTPATIENT
Start: 2023-12-22

## 2023-12-22 RX ORDER — ROSUVASTATIN CALCIUM 40 MG/1
40 TABLET, COATED ORAL DAILY
Qty: 90 TABLET | Refills: 2 | Status: SHIPPED | OUTPATIENT
Start: 2023-12-22

## 2023-12-22 NOTE — TELEPHONE ENCOUNTER
[facility] pharmacy called and stated that the patient is needing some medication refills. Patient needs crestor and Norvasc medication refilled. Please send in medication refill request to [facility].

## 2024-02-16 ENCOUNTER — TELEPHONE (OUTPATIENT)
Dept: INTERNAL MEDICINE | Facility: CLINIC | Age: 69
End: 2024-02-16
Payer: MEDICARE

## 2024-02-16 DIAGNOSIS — K64.9 HEMORRHOIDS, UNSPECIFIED HEMORRHOID TYPE: Primary | ICD-10-CM

## 2024-03-12 DIAGNOSIS — G47.09 OTHER INSOMNIA: ICD-10-CM

## 2024-03-12 RX ORDER — TRAZODONE HYDROCHLORIDE 50 MG/1
50 TABLET ORAL
Qty: 90 TABLET | Refills: 2 | Status: SHIPPED | OUTPATIENT
Start: 2024-03-12

## 2024-03-26 DIAGNOSIS — G47.09 OTHER INSOMNIA: ICD-10-CM

## 2024-03-26 RX ORDER — LOSARTAN POTASSIUM 50 MG/1
50 TABLET ORAL DAILY
Qty: 90 TABLET | Refills: 2 | Status: SHIPPED | OUTPATIENT
Start: 2024-03-26

## 2024-03-26 RX ORDER — TRAZODONE HYDROCHLORIDE 50 MG/1
50 TABLET ORAL
Qty: 90 TABLET | Refills: 2 | Status: SHIPPED | OUTPATIENT
Start: 2024-03-26

## 2024-03-26 NOTE — TELEPHONE ENCOUNTER
Caller: Bernice Dunlap    Relationship: Self    Best call back number: [phone]     Requested Prescriptions:   Requested Prescriptions     Pending Prescriptions Disp Refills    losartan (COZAAR) 50 MG tablet 90 tablet 2     Sig: Take 1 tablet by mouth Daily.    traZODone (DESYREL) 50 MG tablet 90 tablet 2     Sig: Take 1 tablet by mouth every night at bedtime.        Pharmacy where request should be sent: [facility] [address] [phone] [facility] [phone] FX     Last office visit with prescribing clinician: 11/13/2023   Last telemedicine visit with prescribing clinician: Visit date not found   Next office visit with prescribing clinician: 5/20/2024     Additional details provided by patient: PHARMACY CLAIMS THEY HAVE BEEN CONTACTING JESSICA'S TEAM WITH NO RESPONSE.    Machelle Martino Rep   03/26/24 15:49 EDT

## 2024-05-20 ENCOUNTER — OFFICE VISIT (OUTPATIENT)
Dept: INTERNAL MEDICINE | Facility: CLINIC | Age: 69
End: 2024-05-20
Payer: MEDICARE

## 2024-05-20 VITALS
RESPIRATION RATE: 12 BRPM | BODY MASS INDEX: 28.03 KG/M2 | SYSTOLIC BLOOD PRESSURE: 132 MMHG | DIASTOLIC BLOOD PRESSURE: 73 MMHG | WEIGHT: 152.3 LBS | HEART RATE: 66 BPM | OXYGEN SATURATION: 99 % | TEMPERATURE: 98 F | HEIGHT: 62 IN

## 2024-05-20 DIAGNOSIS — Z00.01 ENCOUNTER FOR GENERAL ADULT MEDICAL EXAMINATION WITH ABNORMAL FINDINGS: Primary | ICD-10-CM

## 2024-05-20 DIAGNOSIS — G43.009 MIGRAINE WITHOUT AURA AND WITHOUT STATUS MIGRAINOSUS, NOT INTRACTABLE: ICD-10-CM

## 2024-05-20 DIAGNOSIS — Z12.11 SCREEN FOR COLON CANCER: ICD-10-CM

## 2024-05-20 DIAGNOSIS — M79.7 FIBROMYALGIA: ICD-10-CM

## 2024-05-20 DIAGNOSIS — L30.1 VESICULAR PALMOPLANTAR ECZEMA: ICD-10-CM

## 2024-05-20 DIAGNOSIS — Z13.820 SCREENING FOR OSTEOPOROSIS: ICD-10-CM

## 2024-05-20 DIAGNOSIS — R42 RECURRENT VERTIGO: ICD-10-CM

## 2024-05-20 DIAGNOSIS — K21.9 GASTRIC REFLUX: ICD-10-CM

## 2024-05-20 DIAGNOSIS — H61.22 IMPACTED CERUMEN OF LEFT EAR: ICD-10-CM

## 2024-05-20 DIAGNOSIS — Z95.1 S/P CABG X 1: ICD-10-CM

## 2024-05-20 DIAGNOSIS — I70.90 ARTERIOSCLEROTIC VASCULAR DISEASE: ICD-10-CM

## 2024-05-20 DIAGNOSIS — R26.89 LOSS OF BALANCE: ICD-10-CM

## 2024-05-20 DIAGNOSIS — N20.0 NEPHROLITHIASIS: ICD-10-CM

## 2024-05-20 DIAGNOSIS — E55.9 VITAMIN D DEFICIENCY: ICD-10-CM

## 2024-05-20 DIAGNOSIS — I25.10 CORONARY ARTERY DISEASE INVOLVING NATIVE CORONARY ARTERY OF NATIVE HEART WITHOUT ANGINA PECTORIS: ICD-10-CM

## 2024-05-20 DIAGNOSIS — R73.03 PRE-DIABETES: ICD-10-CM

## 2024-05-20 DIAGNOSIS — N20.0 LEFT NEPHROLITHIASIS: ICD-10-CM

## 2024-05-20 DIAGNOSIS — I10 ESSENTIAL HYPERTENSION: ICD-10-CM

## 2024-05-20 DIAGNOSIS — I65.23 BILATERAL CAROTID ARTERY STENOSIS: ICD-10-CM

## 2024-05-20 DIAGNOSIS — Z00.00 ENCOUNTER FOR SUBSEQUENT ANNUAL WELLNESS VISIT (AWV) IN MEDICARE PATIENT: ICD-10-CM

## 2024-05-20 DIAGNOSIS — E78.2 MIXED HYPERLIPIDEMIA: ICD-10-CM

## 2024-05-20 DIAGNOSIS — M85.89 OSTEOPENIA OF MULTIPLE SITES: ICD-10-CM

## 2024-05-20 DIAGNOSIS — I70.1 RAS (RENAL ARTERY STENOSIS): ICD-10-CM

## 2024-05-20 DIAGNOSIS — Z12.31 ENCOUNTER FOR SCREENING MAMMOGRAM FOR MALIGNANT NEOPLASM OF BREAST: ICD-10-CM

## 2024-05-20 DIAGNOSIS — K64.8 INTERNAL BLEEDING HEMORRHOIDS: ICD-10-CM

## 2024-05-20 PROBLEM — Z72.0 TOBACCO ABUSE: Status: RESOLVED | Noted: 2023-01-12 | Resolved: 2024-05-20

## 2024-05-20 PROCEDURE — G0439 PPPS, SUBSEQ VISIT: HCPCS | Performed by: INTERNAL MEDICINE

## 2024-05-20 PROCEDURE — 1159F MED LIST DOCD IN RCRD: CPT | Performed by: INTERNAL MEDICINE

## 2024-05-20 PROCEDURE — 99397 PER PM REEVAL EST PAT 65+ YR: CPT | Performed by: INTERNAL MEDICINE

## 2024-05-20 PROCEDURE — 3078F DIAST BP <80 MM HG: CPT | Performed by: INTERNAL MEDICINE

## 2024-05-20 PROCEDURE — 3075F SYST BP GE 130 - 139MM HG: CPT | Performed by: INTERNAL MEDICINE

## 2024-05-20 PROCEDURE — 1125F AMNT PAIN NOTED PAIN PRSNT: CPT | Performed by: INTERNAL MEDICINE

## 2024-05-20 PROCEDURE — 1160F RVW MEDS BY RX/DR IN RCRD: CPT | Performed by: INTERNAL MEDICINE

## 2024-05-20 PROCEDURE — 96160 PT-FOCUSED HLTH RISK ASSMT: CPT | Performed by: INTERNAL MEDICINE

## 2024-05-20 RX ORDER — OMEPRAZOLE 20 MG/1
20 CAPSULE, DELAYED RELEASE ORAL DAILY
COMMUNITY

## 2024-05-20 RX ORDER — CEFPODOXIME PROXETIL 200 MG/1
TABLET, FILM COATED ORAL
COMMUNITY
Start: 2024-05-11

## 2024-05-20 RX ORDER — NITROGLYCERIN 0.4 MG/1
TABLET SUBLINGUAL
COMMUNITY

## 2024-05-20 NOTE — PATIENT INSTRUCTIONS
Shingrix (new shingles shot; 2 shot series) check at pharmacy  RSV vaccine at the pharmacy        Medicare Wellness  Personal Prevention Plan of Service     Date of Office Visit:    Encounter Provider:  Bryce Morales MD  Place of Service:  [facility]  Patient Name: Bernice Dunlap  :  1955    As part of the Medicare Wellness portion of your visit today, we are providing you with this personalized preventive plan of services (PPPS). This plan is based upon recommendations of the [place] Preventive Services Task Force (USPSTF) and the Advisory Committee on Immunization Practices (ACIP).    This lists the preventive care services that should be considered, and provides dates of when you are due. Items listed as completed are up-to-date and do not require any further intervention.    Health Maintenance   Topic Date Due    ZOSTER VACCINE (1 of 2) Never done    RSV Vaccine - Adults (1 - 1-dose 60+ series) Never done    MAMMOGRAM  2017    COLORECTAL CANCER SCREENING  2020    DXA SCAN  2021    COVID-19 Vaccine ( season) 2024    INFLUENZA VACCINE  2024    LIPID PANEL  2024    ANNUAL WELLNESS VISIT  2025    Pneumococcal Vaccine 65+ (3 of 3 - PPSV23 or PCV20) 2026    TDAP/TD VACCINES (3 - Td or Tdap) 2032    HEPATITIS C SCREENING  Completed    PAP SMEAR  Discontinued    BMI FOLLOWUP  Discontinued       Orders Placed This Encounter   Procedures    Mammo screening digital tomosynthesis bilateral w CAD     Standing Status:   Future     Standing Expiration Date:   2025     Order Specific Question:   Reason for Exam:     Answer:   Screening     Order Specific Question:   Release to patient     Answer:   Routine Release [[id number]]    DEXA Bone Density Axial     Order Specific Question:   Is patient taking or have taken long term Glucocorticoid (steroids)?     Answer:   No     Order Specific Question:   Does the  patient have rheumatoid arthritis?     Answer:   No     Order Specific Question:   Does the patient have secondary osteoporosis?     Answer:   No     Order Specific Question:   Reason for Exam:     Answer:   Screen osteoporosis     Order Specific Question:   Does this patient have a diabetic monitoring/medication delivering device on?     Answer:   No     Order Specific Question:   Release to patient     Answer:   Routine Release [[id number]]    Comprehensive Metabolic Panel     Order Specific Question:   Release to patient     Answer:   Routine Release [1400000002]    Hemoglobin A1c     Order Specific Question:   Release to patient     Answer:   Routine Release [1400000002]    LDL Cholesterol, Direct     Order Specific Question:   Release to patient     Answer:   Routine Release [1400000002]    HDL Cholesterol     Order Specific Question:   Release to patient     Answer:   Routine Release [1400000002]    UA / M With / Rflx Culture([facility] ONLY) - Urine, Clean Catch     Order Specific Question:   Release to patient     Answer:   Routine Release [1400000002]    Ambulatory Referral For Screening Colonoscopy     Referral Priority:   Routine     Referral Type:   Diagnostic Medical     Referral Reason:   Specialty Services Required     Requested Specialty:   Gastroenterology     Number of Visits Requested:   1    Ambulatory Referral to Physical Therapy     Referral Priority:   Routine     Referral Type:   Physical Therapy     Referral Reason:   Specialty Services Required     Requested Specialty:   Physical Therapy     Number of Visits Requested:   1    Ambulatory Referral to Urology     Referral Priority:   Routine     Referral Type:   Consultation     Referral Reason:   Specialty Services Required     Referred to Provider:   Tejas Abrams MD     Requested Specialty:   Urology     Number of Visits Requested:   1       Return in about 6 months (around 11/20/2024) for Recheck.

## 2024-05-20 NOTE — PROGRESS NOTES
Subjective   Bernice Dunlap is a 69 y.o. female who presents for a Medicare Well Visit    Patient Care Team:  Bryce Morales MD as PCP - General  Bryce Morales MD as PCP - Family Medicine    Recent Hospitalizations: No recent hospitalization(s).    Depression Screen:       2024     8:05 AM   PHQ-2/PHQ-9 Depression Screening   Little Interest or Pleasure in Doing Things 0-->not at all   Feeling Down, Depressed or Hopeless 0-->not at all   PHQ-9: Brief Depression Severity Measure Score 0       Functional and Cognitive Screenin/13/2022     2:00 PM   Functional & Cognitive Status   Do you have difficulty preparing food and eating? No   Do you have difficulty bathing yourself, getting dressed or grooming yourself? No   Do you have difficulty using the toilet? No   Do you have difficulty moving around from place to place? No   Do you have trouble with steps or getting out of a bed or a chair? No   Current Diet Well Balanced Diet   Dental Exam Up to date   Eye Exam Up to date   Exercise (times per week) 7 times per week   Current Exercises Include Walking   Do you need help using the phone?  No   Are you deaf or do you have serious difficulty hearing?  No   Do you need help to go to places out of walking distance? No   Do you need help shopping? No   Do you need help preparing meals?  No   Do you need help with housework?  No   Do you need help with laundry? No   Do you need help taking your medications? No   Do you need help managing money? No   Do you ever drive or ride in a car without wearing a seat belt? No   Have you felt unusual stress, anger or loneliness in the last month? Yes   Who do you live with? Alone   If you need help, do you have trouble finding someone available to you? No   Do you have difficulty concentrating, remembering or making decisions? Yes       Does the patient have evidence of cognitive impairment? no    No opioid medication identified on active medication list. I have  "reviewed chart for other potential  high risk medication/s and harmful drug interactions in the elderly.          Does not need ASA (and currently is not on it)    Vitals:    05/20/24 0807   BP: 132/73   BP Location: Left arm   Patient Position: Sitting   Cuff Size: Adult   Pulse: 66   Resp: 12   Temp: 98 °F (36.7 °C)   TempSrc: Oral   SpO2: 99%   Weight: 69.1 kg (152 lb 4.8 oz)   Height: 157.5 cm (62\")       Body mass index is 27.86 kg/m².    Visual Acuity:  No results found.    Advanced Care Planning:  ACP discussion was held with the patient during this visit. Patient has an advance directive (not in EMR), copy requested.    Compared to one year ago, the patient feels physical health is the same.  Compared to one year ago, the patient feels mental health is the same.    Reviewed chart for potential of high risk medication in the elderly: yes  Reviewed chart for potential of harmful drug interactions in the elderly:yes    Identification of Risk Factors:  Risk factors include: Advance Directive Discussion  Breast Cancer/Mammogram Screening  Cardiovascular risk  Chronic Pain   Colon Cancer Screening  Diabetic Lab Screening   Glaucoma Risk  Immunizations Discussed/Encouraged (specific immunizations; Tdap, Hepatitis A Vaccine/Series, Influenza, Pneumococcal 23, Prevnar 20 (Pneumococcal 20-valent conjugate), Shingrix, COVID19, and RSV (Respiratory Syncytial Virus) ).    Patient Self-Management and Personalized Health Advice  The patient has been provided with information about: diet and exercise and preventive services including:   Annual Wellness Visit (AWV)  Bone Density Measurements  Colorectal Cancer Screening, Colonoscopy  Screening Mammography .  Follow Up: Medicare visit in one year    Discussed the patient's BMI with her. The BMI is above average; BMI management plan is completed.    Patient has multiple medical problems which are significant and separately identifiable that require additional work above and " "beyond the Medicare Wellness Visit. These are not trivial or insignificant and are billed with a 25-modifier    Chief Complaint   Patient presents with    Annual Exam       HPI:  Bernice Dunlap is a 69 y.o. female RTC in yearly CPE/ AWV, review of medical issues:   'They say I have a kidney stone'.  Was in ED one week ago and told 'had a kidney stone the size of a Cadbury egg and I am not so sure how big that is'.  Sx were L flank pain.  Had UTI as well and given Abx.  Has completed Abx at this time. \"Told to see my primary care'. Sx that remain are L flank pain, 'it is a little bit better' . No blood seen in urine at all.      1. HTN; new dx of LANDON (mod on R and mild on L) on CT angio in ED 11/2023 - controlled on three drugs. Good numbers on home log.   2. Pre-DM - weight is stable, remains active. Feesl like overall going 'better'.  Cut out beef and eating more vegetables.  Walks all the time for exercise.   3. Insomnia - variable over time.  Trazodone working well. \"It is wonderful'.  Takes med nightly.   4. ASCVD s/p CABG in 2008 and (-) stress in 2016 and 11/2018; inpt admit 10/2019 with (+) stress test and new mid RCA lesion  S/p PTCA x1 - Asx'ic.  Off Brilinta, on clopidogrel only. Did see Cards in [place] and 'did not care for him'. Planning to rescheduled here in [place].  No med changes noted.  No classic angina, though rarely has 'some type of pains' and used NTG once last during renal issues.  'First NTG pill I have had take in years..   5. Fibromyalgia - \"I have not had any episodes of FM in a while'.    was long term controlled on Lyrica, off due to blurred vision and doing well overall from pain standpoint. CIARA.  6. ASCVD s/p CABG in 2008 and (-) stress in 2016 and 11/2018; inpt admit 10/2019 with (+) stress test and new mid RCA lesion  S/p PTCA x1 - Asx'ic.  Off Brilinta, on clopidogrel only. Statin, ARB, B-blocker, c/w same. F/U Cards as planned.  7. Vitamin D deficiency - on 2000 I.U. Daily.  " "  8. Pomphylox eczema - resolved.  \"It left and never came back'.   9. Migraine HA - CIARA over> 3 years.   10. Gastric Reflux - off PPI daily with diet mods/ weight loss. \"I have not had much of that'.  Taking omeprazole OTC << daily, really uses if has flare ups.   11. HM - not had Shingrix at pharmacy, willing to get; heard of RSV, but not had shot yet; C-scope due, wants referral to MD in [place].     Review of Systems   HENT:  Negative for congestion, odynophagia and sore throat.    Eyes:  Negative for discharge, double vision, pain and redness.        Last eye exam 2022, had cataract surgery.   Cardiovascular:  Positive for chest pain (rare events, last was one week ago with renal stone issues). Negative for dyspnea on exertion, irregular heartbeat, leg swelling, near-syncope, palpitations and syncope.   Respiratory:  Negative for cough and shortness of breath.    Endocrine: Negative for polydipsia, polyphagia and polyuria.   Hematologic/Lymphatic: Negative for bleeding problem. Does not bruise/bleed easily.   Skin:  Negative for rash and suspicious lesions.   Musculoskeletal:  Positive for falls (due to dizziness issues.). Negative for joint pain, joint swelling, muscle cramps, muscle weakness and myalgias.   Gastrointestinal:  Negative for constipation, diarrhea, dysphagia, nausea and vomiting.   Genitourinary:  Negative for bladder incontinence, dysuria, frequency, hematuria, hesitancy and incomplete emptying.   Neurological:  Positive for dizziness ('Dizzy all the time, just get off balance'.), loss of balance and vertigo (Positional changes will have sx. 'Thought is was one of my meds'.). Negative for headaches and light-headedness.   Psychiatric/Behavioral:  Negative for depression. The patient does not have insomnia (controlled) and is not nervous/anxious.    Allergic/Immunologic: Negative for environmental allergies and persistent infections.       @OBJECTIVE BEGIN@  Vitals:    05/20/24 0807   BP: " 132/73   Pulse: 66   Resp: 12   Temp: 98 °F (36.7 °C)   SpO2: 99%     Physical Exam  Vitals reviewed. Exam conducted with a chaperone present (DAPHNE Middleton).   Constitutional:       General: She is not in acute distress.     Appearance: Normal appearance. She is well-developed. She is not ill-appearing or toxic-appearing.   HENT:      Head: Normocephalic and atraumatic.      Right Ear: Hearing, tympanic membrane, ear canal and external ear normal. There is no impacted cerumen.      Left Ear: Hearing, tympanic membrane, ear canal and external ear normal. There is impacted cerumen.      Ears:      Comments: Cerumen lavaged to near clear (minimal remnant cerumen posterior canal) in office by MA     Nose: Nose normal.      Mouth/Throat:      Mouth: Mucous membranes are moist.      Pharynx: Oropharynx is clear. Uvula midline. No oropharyngeal exudate.      Comments: Large kay noted on B hard palatte    Eyes:      General: Lids are normal. No scleral icterus.     Extraocular Movements: Extraocular movements intact.      Conjunctiva/sclera: Conjunctivae normal.      Pupils: Pupils are equal, round, and reactive to light.   Neck:      Thyroid: No thyroid mass or thyromegaly.      Vascular: Carotid bruit (on R) present.      Trachea: Trachea normal.   Cardiovascular:      Rate and Rhythm: Normal rate and regular rhythm.      Pulses:           Radial pulses are 2+ on the right side and 2+ on the left side.        Dorsalis pedis pulses are 2+ on the right side and 2+ on the left side.        Posterior tibial pulses are 2+ on the right side and 2+ on the left side.      Heart sounds: Normal heart sounds, S1 normal and S2 normal. No murmur heard.     No friction rub. No gallop.   Pulmonary:      Effort: Pulmonary effort is normal. No respiratory distress.      Breath sounds: Normal breath sounds. No wheezing, rhonchi or rales.   Chest:      Chest wall: No mass.   Breasts:     Right: No inverted nipple, mass, nipple discharge or  skin change.      Left: No inverted nipple, mass, nipple discharge or skin change.   Abdominal:      General: Bowel sounds are normal. There is no distension.      Palpations: Abdomen is soft. There is no mass.      Tenderness: There is no abdominal tenderness. There is left CVA tenderness (mild/ minimal). There is no right CVA tenderness, guarding or rebound.   Musculoskeletal:         General: Normal range of motion.      Cervical back: Full passive range of motion without pain, normal range of motion and neck supple.      Right lower leg: No edema.      Left lower leg: No edema.      Right foot: Normal range of motion. No deformity or bunion.      Left foot: Normal range of motion. No deformity or bunion.   Feet:      Right foot:      Skin integrity: No ulcer, blister or skin breakdown.      Left foot:      Skin integrity: No ulcer, blister or skin breakdown.   Lymphadenopathy:      Cervical: No cervical adenopathy.      Right cervical: No superficial, deep or posterior cervical adenopathy.     Left cervical: No superficial, deep or posterior cervical adenopathy.      Upper Body:      Right upper body: No supraclavicular, axillary or pectoral adenopathy.      Left upper body: No supraclavicular, axillary or pectoral adenopathy.      Lower Body: No right inguinal adenopathy. No left inguinal adenopathy.   Skin:     General: Skin is warm and dry.      Findings: No rash.   Neurological:      Mental Status: She is alert and oriented to person, place, and time.      Cranial Nerves: No cranial nerve deficit, dysarthria or facial asymmetry.      Sensory: No sensory deficit.      Motor: No weakness, tremor, atrophy or abnormal muscle tone.      Gait: Gait normal.      Deep Tendon Reflexes: Reflexes are normal and symmetric.      Reflex Scores:       Patellar reflexes are 1+ on the right side and 1+ on the left side.       Achilles reflexes are 1+ on the right side and 1+ on the left side.     Comments: Maikol Hallpike (+)  On L, with sx and nystagmus     Psychiatric:         Mood and Affect: Mood normal.         Behavior: Behavior normal.         Thought Content: Thought content normal.         Assessment & Plan   Diagnoses and all orders for this visit:    1. Encounter for general adult medical examination with abnormal findings (Primary)  -     Comprehensive Metabolic Panel  -     Hemoglobin A1c  -     LDL Cholesterol, Direct  -     HDL Cholesterol  -     UA / M With / Rflx Culture([facility] ONLY) - Urine, Clean Catch    2. Encounter for subsequent annual wellness visit (AWV) in Medicare patient    3. Vitamin D deficiency    4. Vesicular palmoplantar eczema    5. Arteriosclerotic vascular disease    6. Bilateral carotid artery stenosis  -     Hemoglobin A1c  -     LDL Cholesterol, Direct  -     HDL Cholesterol    7. Coronary artery disease involving native coronary artery of native heart without angina pectoris  -     Hemoglobin A1c  -     LDL Cholesterol, Direct  -     HDL Cholesterol    8. Essential hypertension  -     Comprehensive Metabolic Panel  -     UA / M With / Rflx Culture([facility] ONLY) - Urine, Clean Catch    9. Fibromyalgia    10. Gastric reflux    11. Mixed hyperlipidemia  -     Comprehensive Metabolic Panel  -     Hemoglobin A1c  -     LDL Cholesterol, Direct  -     HDL Cholesterol    12. Migraine without aura and without status migrainosus, not intractable    13. Nephrolithiasis    14. Osteopenia of multiple sites  -     DEXA Bone Density Axial    15. LANDON (renal artery stenosis)    16. S/P CABG x 1    17. Pre-diabetes  -     Hemoglobin A1c    18. Left nephrolithiasis  -     Ambulatory Referral to Urology    19. Recurrent vertigo  -     Ambulatory Referral to Physical Therapy    20. Loss of balance  -     Ambulatory Referral to Physical Therapy    21. Screening for osteoporosis  -     DEXA Bone Density Axial    22. Encounter for screening mammogram for malignant neoplasm of breast  -     Mammo screening digital  tomosynthesis bilateral w CAD; Future    23. Screen for colon cancer  -     Ambulatory Referral For Screening Colonoscopy    24. Internal bleeding hemorrhoids  -     Ambulatory Referral For Screening Colonoscopy    25. Impacted cerumen of left ear        Diagnoses and all orders for this visit:    Encounter for general adult medical examination with abnormal findings  -     Comprehensive Metabolic Panel  -     Hemoglobin A1c  -     LDL Cholesterol, Direct  -     HDL Cholesterol  -     UA / M With / Rflx Culture([facility] ONLY) - Urine, Clean Catch    Encounter for subsequent annual wellness visit (AWV) in Medicare patient    Vitamin D deficiency    Vesicular palmoplantar eczema    Arteriosclerotic vascular disease    Bilateral carotid artery stenosis  -     Hemoglobin A1c  -     LDL Cholesterol, Direct  -     HDL Cholesterol    Coronary artery disease involving native coronary artery of native heart without angina pectoris  -     Hemoglobin A1c  -     LDL Cholesterol, Direct  -     HDL Cholesterol    Essential hypertension  -     Comprehensive Metabolic Panel  -     UA / M With / Rflx Culture([facility] ONLY) - Urine, Clean Catch    Fibromyalgia    Gastric reflux    Mixed hyperlipidemia  -     Comprehensive Metabolic Panel  -     Hemoglobin A1c  -     LDL Cholesterol, Direct  -     HDL Cholesterol    Migraine without aura and without status migrainosus, not intractable    Nephrolithiasis    Osteopenia of multiple sites  -     DEXA Bone Density Axial    LANDON (renal artery stenosis)    S/P CABG x 1    Pre-diabetes  -     Hemoglobin A1c    Left nephrolithiasis  -     Ambulatory Referral to Urology    Recurrent vertigo  -     Ambulatory Referral to Physical Therapy    Loss of balance  -     Ambulatory Referral to Physical Therapy    Screening for osteoporosis  -     DEXA Bone Density Axial    Encounter for screening mammogram for malignant neoplasm of breast  -     Mammo screening digital tomosynthesis bilateral w CAD;  Future    Screen for colon cancer  -     Ambulatory Referral For Screening Colonoscopy    Internal bleeding hemorrhoids  -     Ambulatory Referral For Screening Colonoscopy    Impacted cerumen of left ear    Other orders  -     nitroglycerin (NITROSTAT) 0.4 MG SL tablet; DISSOLVE 1 TABLET UNDER TONGUE EVERY 5 MIN AS NEEDED FOR CHEST PAIN. limit A TOTAL OF 3 DOSES IN 15 MIN.  -     cefpodoxime (VANTIN) 200 MG tablet; TAKE 1/2 TABLET BY MOUTH TWICE DAILY for 7 DAYS  -     omeprazole (priLOSEC) 20 MG capsule; Take 1 capsule by mouth Daily.          Bernice Dunlap is a 69 y.o. female RTC in yearly CPE/ AWV, review of medical issues:   1. L sided nephrolithiasis; recall,  new issue 7/2018 in ED at [facility] with retained 5mm stone on CT 2/2019 on L and  2/2020 CT  - recent re-dx in ED one week ago, in [place], [place].  Associated with UTI, s/p Abx and improved sx.  Rehceck U/A today. Strain urine, counseled. Refer to urology in [facility] for eval, counseled.   2. Recurrent veritgo sx, balance loss - nystagmus on [place] Hallpike today, mild sx. Duration and persistencw/ constant nature of sx suggest dx other than BPPV. Refer to PT in [facility] for vestibular testing and rehab. Consider MRI if testing inconclusive or suggestive.   3. HTN; new dx of LANDON (mod on R and mild on L) on CT angio in ED 11/2023 - controlled on three drugs.Good home log. Check BMP.  4. Pre-DM - weight is stable, remains active. Trend A1C today.    5. Insomnia - variable over time.  Trazodone working well.   6. ASCVD s/p CABG in 2008 and (-) stress in 2016 and 11/2018; inpt admit 10/2019 with (+) stress test and new mid RCA lesion  S/p PTCA x1 - Asx'ic.  Off Brilinta, on clopidogrel only. UTD Cards in [place], planning reschedule here in [place]. Recent C/P event equivocal and single use of NTG noted; px not c/w unstable angina. C/W Statin, Zetia, ARB, B-blocker, clopidogrel. Trend lipids today. F/U Cards as planned.  7. Fibromyalgia - was long term controlled on  Lyrica, off due to blurred vision and doing well overall from pain standpoint. No recent issues.  8. Carotid Artery Stenosis, 16-49% B on 1/2018 U/S; 1-15% --> 16-49% B 1/2018 - Asx'ic.  C/W statin and clopidogrel.   9. Vitamin D deficiency - on 2000 I.U. Daily.    10. Pomphylox eczema - resolved.  No recurrence.   11. Migraine HA - CIARA over> 3 years.   12. Pilar Cyst - scalp, not bothersome. Declines plastics referral in past. Issue deferred today.  13. Gastric Reflux - off PPI daily with diet mods/ weight loss.   14.  - check labs; Flu/ COVID/ Tdap / Pvax/ Prevnar/ Hep A - UTD;  Shingrix and RSV at pharmacy, counseled; C-scope 2010 (-) --> 10 years, refer again today; Mammo due, refer; Pap D/C'd s/p LEONORA; DEXA due, repeat ordered; Hep C Ab (-) 2014; add back exercise; Preventative care plan provided to pt at end of visit    Return in about 6 months (around 11/20/2024) for Recheck. (CMP, A1C, CBC, U/A)          Current Outpatient Medications:     amLODIPine (NORVASC) 5 MG tablet, Take 1 tablet by mouth Daily., Disp: 90 tablet, Rfl: 3    buPROPion SR (WELLBUTRIN SR) 150 MG 12 hr tablet, TAKE 1 TABLET BY MOUTH DAILY FOR 3 DAYS THEN INCREASE TO 1 TABLET TWICE DAILY THEREAFTER, Disp: 60 tablet, Rfl: 1    cefpodoxime (VANTIN) 200 MG tablet, TAKE 1/2 TABLET BY MOUTH TWICE DAILY for 7 DAYS, Disp: , Rfl:     Cholecalciferol ( VITAMIN D3) 2000 units capsule, Take 2,000 Units by mouth Daily., Disp: 30 each, Rfl:     clopidogrel (PLAVIX) 75 MG tablet, Take 1 tablet by mouth Daily., Disp: 90 tablet, Rfl: 2    ezetimibe (ZETIA) 10 MG tablet, Take 1 tablet by mouth Daily., Disp: 90 tablet, Rfl: 2    losartan (COZAAR) 50 MG tablet, Take 1 tablet by mouth Daily., Disp: 90 tablet, Rfl: 2    metoprolol succinate XL (TOPROL-XL) 50 MG 24 hr tablet, Take 1 tablet by mouth Daily., Disp: 90 tablet, Rfl: 2    rosuvastatin (CRESTOR) 40 MG tablet, Take 1 tablet by mouth Daily., Disp: 90 tablet, Rfl: 2    traZODone (DESYREL) 50 MG tablet,  Take 1 tablet by mouth every night at bedtime., Disp: 90 tablet, Rfl: 2    nitroglycerin (NITROSTAT) 0.4 MG SL tablet, DISSOLVE 1 TABLET UNDER TONGUE EVERY 5 MIN AS NEEDED FOR CHEST PAIN. limit A TOTAL OF 3 DOSES IN 15 MIN., Disp: , Rfl:     omeprazole (priLOSEC) 20 MG capsule, Take 1 capsule by mouth Daily., Disp: , Rfl:

## 2024-05-22 LAB
ALBUMIN SERPL-MCNC: 4.3 G/DL (ref 3.5–5.2)
ALBUMIN/GLOB SERPL: 1.8 G/DL
ALP SERPL-CCNC: 71 U/L (ref 39–117)
ALT SERPL-CCNC: 17 U/L (ref 1–33)
APPEARANCE UR: CLEAR
AST SERPL-CCNC: 17 U/L (ref 1–32)
BACTERIA #/AREA URNS HPF: NORMAL /[HPF]
BACTERIA UR CULT: NORMAL
BACTERIA UR CULT: NORMAL
BILIRUB SERPL-MCNC: 0.4 MG/DL (ref 0–1.2)
BILIRUB UR QL STRIP: NEGATIVE
BUN SERPL-MCNC: 15 MG/DL (ref 8–23)
BUN/CREAT SERPL: 22.4 (ref 7–25)
CALCIUM SERPL-MCNC: 9.3 MG/DL (ref 8.6–10.5)
CASTS URNS QL MICRO: NORMAL /LPF
CHLORIDE SERPL-SCNC: 103 MMOL/L (ref 98–107)
CO2 SERPL-SCNC: 24.4 MMOL/L (ref 22–29)
COLOR UR: YELLOW
CREAT SERPL-MCNC: 0.67 MG/DL (ref 0.57–1)
EGFRCR SERPLBLD CKD-EPI 2021: 95.3 ML/MIN/1.73
EPI CELLS #/AREA URNS HPF: NORMAL /HPF (ref 0–10)
GLOBULIN SER CALC-MCNC: 2.4 GM/DL
GLUCOSE SERPL-MCNC: 88 MG/DL (ref 65–99)
GLUCOSE UR QL STRIP: NEGATIVE
HBA1C MFR BLD: 5.5 % (ref 4.8–5.6)
HDLC SERPL-MCNC: 53 MG/DL (ref 40–60)
HGB UR QL STRIP: NEGATIVE
KETONES UR QL STRIP: NEGATIVE
LDLC SERPL DIRECT ASSAY-MCNC: 74 MG/DL (ref 0–100)
LEUKOCYTE ESTERASE UR QL STRIP: ABNORMAL
MICRO URNS: ABNORMAL
NITRITE UR QL STRIP: NEGATIVE
PH UR STRIP: 5.5 [PH] (ref 5–7.5)
POTASSIUM SERPL-SCNC: 4.4 MMOL/L (ref 3.5–5.2)
PROT SERPL-MCNC: 6.7 G/DL (ref 6–8.5)
PROT UR QL STRIP: NEGATIVE
RBC #/AREA URNS HPF: NORMAL /HPF (ref 0–2)
SODIUM SERPL-SCNC: 137 MMOL/L (ref 136–145)
SP GR UR STRIP: 1.02 (ref 1–1.03)
URINALYSIS REFLEX: ABNORMAL
UROBILINOGEN UR STRIP-MCNC: 0.2 MG/DL (ref 0.2–1)
WBC #/AREA URNS HPF: NORMAL /HPF (ref 0–5)

## 2024-05-23 ENCOUNTER — TELEPHONE (OUTPATIENT)
Dept: INTERNAL MEDICINE | Facility: CLINIC | Age: 69
End: 2024-05-23
Payer: MEDICARE

## 2024-05-24 ENCOUNTER — TELEPHONE (OUTPATIENT)
Dept: INTERNAL MEDICINE | Facility: CLINIC | Age: 69
End: 2024-05-24
Payer: MEDICARE

## 2024-05-24 NOTE — TELEPHONE ENCOUNTER
Pt returned call to Shannen in regards to lab results- I read pt lab results- Pt understood and had no further questions.

## 2024-06-24 DIAGNOSIS — I10 BENIGN ESSENTIAL HYPERTENSION: ICD-10-CM

## 2024-06-24 RX ORDER — CLOPIDOGREL BISULFATE 75 MG/1
75 TABLET ORAL DAILY
Qty: 90 TABLET | Refills: 2 | Status: SHIPPED | OUTPATIENT
Start: 2024-06-24

## 2024-06-24 RX ORDER — METOPROLOL SUCCINATE 50 MG/1
50 TABLET, EXTENDED RELEASE ORAL DAILY
Qty: 90 TABLET | Refills: 2 | Status: SHIPPED | OUTPATIENT
Start: 2024-06-24

## 2024-08-06 ENCOUNTER — TELEPHONE (OUTPATIENT)
Dept: INTERNAL MEDICINE | Facility: CLINIC | Age: 69
End: 2024-08-06

## 2024-08-06 NOTE — TELEPHONE ENCOUNTER
Caller: Bernice Dunlap    Relationship: Self    Best call back number: [phone]     What specialty or service is being requested: MAMMOGRAM AND BONE DENSITY SCAN    What is the office location: [facility]    Any additional details: PATIENT IS CALLING REGARDING EXISTING REFERRAL. OFFICE IS STATING REFERRAL WAS NEVER RECEIVED FOR BONE DENSITY SCAN AND MAMMOGRAM.

## 2024-08-06 NOTE — TELEPHONE ENCOUNTER
Re-opened referral and changed status to ready schedule for the Mammogram and Dexa Bone Density scan. Called patient and left voice mail informing her [facility] tried to call several times before but couldn't get a return call. Told patient the referrals were in the chart and she could call [phone] to get those appointments scheduled.

## 2024-09-26 DIAGNOSIS — I70.90 ARTERIOSCLEROTIC VASCULAR DISEASE: ICD-10-CM

## 2024-09-26 DIAGNOSIS — E78.2 MIXED HYPERLIPIDEMIA: ICD-10-CM

## 2024-09-26 RX ORDER — ROSUVASTATIN CALCIUM 40 MG/1
40 TABLET, COATED ORAL DAILY
Qty: 90 TABLET | Refills: 2 | Status: SHIPPED | OUTPATIENT
Start: 2024-09-26

## 2024-09-26 RX ORDER — EZETIMIBE 10 MG/1
10 TABLET ORAL DAILY
Qty: 90 TABLET | Refills: 2 | Status: SHIPPED | OUTPATIENT
Start: 2024-09-26

## 2024-10-15 ENCOUNTER — TELEPHONE (OUTPATIENT)
Dept: CARDIOLOGY | Facility: CLINIC | Age: 69
End: 2024-10-15
Payer: MEDICARE

## 2024-10-15 NOTE — TELEPHONE ENCOUNTER
Caller: Bernice Dunlap    Relationship: Self    Best call back number: [phone]    What is the best time to reach you: ANYTIME    Who are you requesting to speak with (clinical staff, provider,  specific staff member): CLINICAL    What was the call regarding: PT IS CALLING TO SEE IF SHE COULD SPEAK WITH SOMEONE ABOUT A MEDICATION THAT WAS SUPPOSED TO BE SENT IN. PLEASE CALL PT

## 2024-10-16 RX ORDER — BUPROPION HYDROCHLORIDE 150 MG/1
150 TABLET, EXTENDED RELEASE ORAL 2 TIMES DAILY
Qty: 180 TABLET | Refills: 0 | Status: SHIPPED | OUTPATIENT
Start: 2024-10-16

## 2024-10-16 NOTE — TELEPHONE ENCOUNTER
I will refill this time.  However, moving forward I would prefer her PCP to take over this prescription.

## 2024-10-16 NOTE — TELEPHONE ENCOUNTER
Spoke w/ pt and she stated she needs Wellbutrin refilled.    Please advise of recommendations or send Rx.    HOWARD Bay

## 2024-11-22 ENCOUNTER — OFFICE VISIT (OUTPATIENT)
Dept: CARDIOLOGY | Facility: CLINIC | Age: 69
End: 2024-11-22
Payer: MEDICARE

## 2024-11-22 VITALS
HEART RATE: 61 BPM | DIASTOLIC BLOOD PRESSURE: 60 MMHG | WEIGHT: 154.4 LBS | BODY MASS INDEX: 28.41 KG/M2 | SYSTOLIC BLOOD PRESSURE: 142 MMHG | HEIGHT: 62 IN

## 2024-11-22 DIAGNOSIS — E78.2 MIXED HYPERLIPIDEMIA: ICD-10-CM

## 2024-11-22 DIAGNOSIS — R73.03 PRE-DIABETES: ICD-10-CM

## 2024-11-22 DIAGNOSIS — I10 ESSENTIAL HYPERTENSION: ICD-10-CM

## 2024-11-22 DIAGNOSIS — Z95.1 S/P CABG X 1: Primary | ICD-10-CM

## 2024-11-22 NOTE — PROGRESS NOTES
Date of Office Visit: 24  Encounter Provider: Rashad Contreras MD  Place of Service: [facility]  Patient Name: Bernice Dunlap  :1955  [id number]    Chief Complaint   Patient presents with    Coronary Artery Disease   :     HPI: Bernice Dunlap is a 69 y.o. female she is here for follow-up she had a myocardial infarction in  and then ultimately had a one-vessel CABG in  she last had a stress test in 2018 and that was normal.  So she had unstable angina and had a 3.0x15 resolute drug-eluting stent implanted to her mid circumflex in 2019 at [place].  She has done very well since then she had a little bit of mild 50% disease in a small ZOIE but otherwise her coronaries were good and her LV function was normal.      She is here for follow-up today and is doing well no chest pain shortness of breath PND orthopnea edema syncope or palpitations.    Past Medical History:   Diagnosis Date    Arteriosclerosis of carotid artery     1-15% B 2013 U/S --> 16-49% bilaterally in     Arteriosclerotic cardiovascular disease (ASCVD)     MI ; s/p CABG x 1     Benign essential hypertension     BPPV (benign paroxysmal positional vertigo)     COVID-19 2022    Fibromyalgia     History of nephrolithiasis     on L; non-obstructing on  CT; reidentified on X-ray in 2016    Hydrothorax 2016    Hyperlipidemia     Migraine headache     Myocardial infarction     Onset Date:     Pleural effusion 2016    Pre-diabetes     Stress and adjustment reaction 2016    Tobacco abuse 2023    Vitamin D deficiency        Past Surgical History:   Procedure Laterality Date    APPENDECTOMY      CATARACT EXTRACTION, BILATERAL Bilateral     CHOLECYSTECTOMY      Laparoscopic    CORONARY ANGIOPLASTY  10/2019    CORONARY ARTERY BYPASS GRAFT      1 vessel bypass. Mammary artery to the LAD; ; nl stress in     HYSTERECTOMY       SALPINGO OOPHORECTOMY Bilateral     THORACOSCOPY      With Pleurodesis (Therapeutic) -  after traumatic effusion; Dr. Tomas       Social History     Socioeconomic History    Marital status:     Number of children: 1   Tobacco Use    Smoking status: Former     Current packs/day: 0.00     Types: Cigarettes     Quit date: 2019     Years since quittin.8    Smokeless tobacco: Never    Tobacco comments:     6 pk/yr hx; occasionally smokes; quit 2019   Vaping Use    Vaping status: Never Used   Substance and Sexual Activity    Alcohol use: No     Comment: caffeine use minimal    Drug use: No    Sexual activity: Not Currently     Partners: Male     Comment: no hx STD's       Family History   Problem Relation Age of Onset    Heart disease Mother         Arteriosclerotic cardiovascular disease; dx'd in 50's    Alcohol abuse Father         Alcoholism    COPD Father     Depression Sister     Leukemia Sister     Diabetes Sister         Type 2 Diabetes Mellitus    Alcohol abuse Brother         Alcoholism    Hypertension Brother         Benign Essential Hypertension    Glaucoma Brother     Hyperlipidemia Brother     Prostate cancer Brother     No Known Problems Maternal Grandmother     No Known Problems Maternal Grandfather     No Known Problems Paternal Grandmother     No Known Problems Paternal Grandfather     No Known Problems Son        Review of Systems   Constitutional: Negative for decreased appetite, fever, malaise/fatigue and weight loss.   HENT:  Negative for nosebleeds.    Eyes:  Negative for double vision.   Cardiovascular:  Negative for chest pain, claudication, cyanosis, dyspnea on exertion, irregular heartbeat, leg swelling, near-syncope, orthopnea, palpitations, paroxysmal nocturnal dyspnea and syncope.   Respiratory:  Negative for cough, hemoptysis and shortness of breath.    Hematologic/Lymphatic: Negative for bleeding problem.   Skin:  Negative for rash.   Musculoskeletal:  Negative for falls and  "myalgias.   Gastrointestinal:  Negative for hematochezia, jaundice, melena, nausea and vomiting.   Genitourinary:  Negative for hematuria.   Neurological:  Negative for dizziness and seizures.   Psychiatric/Behavioral:  Negative for altered mental status and memory loss.        Allergies   Allergen Reactions    Imitrex [Sumatriptan] Other (See Comments)     MI    Lisinopril Cough         Current Outpatient Medications:     amLODIPine (NORVASC) 5 MG tablet, Take 1 tablet by mouth Daily., Disp: 90 tablet, Rfl: 3    buPROPion SR (WELLBUTRIN SR) 150 MG 12 hr tablet, Take 1 tablet by mouth 2 (Two) Times a Day., Disp: 180 tablet, Rfl: 0    Cholecalciferol (HM VITAMIN D3) 2000 units capsule, Take 2,000 Units by mouth Daily., Disp: 30 each, Rfl:     clopidogrel (PLAVIX) 75 MG tablet, Take 1 tablet by mouth Daily., Disp: 90 tablet, Rfl: 2    ezetimibe (ZETIA) 10 MG tablet, Take 1 tablet by mouth Daily., Disp: 90 tablet, Rfl: 2    losartan (COZAAR) 50 MG tablet, Take 1 tablet by mouth Daily., Disp: 90 tablet, Rfl: 2    metoprolol succinate XL (TOPROL-XL) 50 MG 24 hr tablet, Take 1 tablet by mouth Daily., Disp: 90 tablet, Rfl: 2    nitroglycerin (NITROSTAT) 0.4 MG SL tablet, DISSOLVE 1 TABLET UNDER TONGUE EVERY 5 MIN AS NEEDED FOR CHEST PAIN. limit A TOTAL OF 3 DOSES IN 15 MIN., Disp: , Rfl:     omeprazole (priLOSEC) 20 MG capsule, Take 1 capsule by mouth Daily., Disp: , Rfl:     rosuvastatin (CRESTOR) 40 MG tablet, Take 1 tablet by mouth Daily., Disp: 90 tablet, Rfl: 2    traZODone (DESYREL) 50 MG tablet, Take 1 tablet by mouth every night at bedtime., Disp: 90 tablet, Rfl: 2    cefpodoxime (VANTIN) 200 MG tablet, TAKE 1/2 TABLET BY MOUTH TWICE DAILY for 7 DAYS (Patient not taking: Reported on 11/22/2024), Disp: , Rfl:       Objective:     Vitals:    11/22/24 1311   BP: 142/60   Pulse: 61   Weight: 70 kg (154 lb 6.4 oz)   Height: 157.5 cm (62\")     Body mass index is 28.24 kg/m².    Physical Exam  Constitutional:       " Appearance: She is well-developed.   HENT:      Head: Normocephalic.   Eyes:      General: No scleral icterus.  Neck:      Thyroid: No thyromegaly.      Vascular: No JVD.   Cardiovascular:      Rate and Rhythm: Normal rate and regular rhythm.      Heart sounds: Normal heart sounds. No murmur heard.     No friction rub. No gallop.   Pulmonary:      Effort: Pulmonary effort is normal.      Breath sounds: Normal breath sounds. No wheezing or rales.   Chest:       Abdominal:      Palpations: Abdomen is soft.      Tenderness: There is no abdominal tenderness.   Musculoskeletal:         General: Normal range of motion.   Lymphadenopathy:      Cervical: No cervical adenopathy.   Skin:     General: Skin is warm and dry.      Findings: No rash.   Neurological:      Mental Status: She is alert and oriented to person, place, and time.           ECG 12 Lead    Date/Time: 11/22/2024 1:46 PM  Performed by: Rashad Contreras MD    Authorized by: Rashad Contreras MD  Comparison: compared with previous ECG   Similar to previous ECG  Rhythm: sinus rhythm    Clinical impression: normal ECG           Assessment:       Diagnosis Plan   1. S/P CABG x 1        2. Mixed hyperlipidemia        3. Pre-diabetes        4. Essential hypertension               Plan:       I think she is doing pretty well she is on max dose of Crestor and on Zetia so we will see what her lipids are when they get him in December I am going to draw a lipo A and in ApoLipo b on her.  We will keep her on her same meds she is otherwise asymptomatic and doing well I encouraged her to continue to be active    As always, it has been a pleasure to participate in your patient's care.      Sincerely,       Rashad Contreras MD

## 2024-12-26 DIAGNOSIS — N20.0 NEPHROLITHIASIS: ICD-10-CM

## 2024-12-26 DIAGNOSIS — I10 ESSENTIAL HYPERTENSION: ICD-10-CM

## 2024-12-26 DIAGNOSIS — N20.0 LEFT NEPHROLITHIASIS: ICD-10-CM

## 2024-12-26 DIAGNOSIS — R73.03 PRE-DIABETES: Primary | ICD-10-CM

## 2024-12-26 DIAGNOSIS — N39.0 RECURRENT UTI: ICD-10-CM

## 2024-12-26 RX ORDER — LOSARTAN POTASSIUM 50 MG/1
50 TABLET ORAL DAILY
Qty: 90 TABLET | Refills: 2 | OUTPATIENT
Start: 2024-12-26

## 2025-02-27 RX ORDER — LOSARTAN POTASSIUM 50 MG/1
50 TABLET ORAL DAILY
Qty: 90 TABLET | Refills: 2 | OUTPATIENT
Start: 2025-02-27

## 2025-02-27 NOTE — TELEPHONE ENCOUNTER
Caller: Bernice Dunlap    Relationship: Self    Best call back number: [phone]     Requested Prescriptions:   Requested Prescriptions     Pending Prescriptions Disp Refills    losartan (COZAAR) 50 MG tablet 90 tablet 2     Sig: Take 1 tablet by mouth Daily.        Pharmacy where request should be sent: [facility] [address] [place] [address] [phone] [facility] [phone] FX     Last office visit with prescribing clinician: 5/20/2024   Last telemedicine visit with prescribing clinician: Visit date not found   Next office visit with prescribing clinician: 5/29/2025       Does the patient have less than a 3 day supply:  [x] Yes  [] No    Would you like a call back once the refill request has been completed: [] Yes [x] No    If the office needs to give you a call back, can they leave a voicemail: [] Yes [x] No    Machelle Clayton Rep   02/27/25 13:37 EST

## 2025-03-07 ENCOUNTER — TELEPHONE (OUTPATIENT)
Dept: INTERNAL MEDICINE | Facility: CLINIC | Age: 70
End: 2025-03-07
Payer: MEDICARE

## 2025-03-07 NOTE — TELEPHONE ENCOUNTER
Ok for HUB to read:    Left voice mail for patient to schedule office visit and fasting labs prior with Dr. Morales in order to get medication refilled

## 2025-03-07 NOTE — TELEPHONE ENCOUNTER
Patient called and stated that she is wondering why her prescription of Lorsartan was denied. I looked and it said patient needs to schedule an appointment.    Patient has an appointment scheduled in May for her AWV with Dr. Morales. Does patient need to schedule before for an office visit to get medication refilled or ok to wait until May appointment?    Patient would like a call back advising her what is going to been done.    Best call back # [phone]

## 2025-03-07 NOTE — TELEPHONE ENCOUNTER
Jessica please call patient and have her schedule lab appt and f/u visit. She did not come for 6mo follow up and patient needs labs and to see Dr. Morales. Try to get her in as soon as possible.

## 2025-03-10 DIAGNOSIS — I10 BENIGN ESSENTIAL HYPERTENSION: ICD-10-CM

## 2025-03-10 RX ORDER — METOPROLOL SUCCINATE 50 MG/1
50 TABLET, EXTENDED RELEASE ORAL DAILY
Qty: 90 TABLET | Refills: 1 | Status: SHIPPED | OUTPATIENT
Start: 2025-03-10

## 2025-03-12 LAB
ALBUMIN SERPL-MCNC: 4.4 G/DL (ref 3.9–4.9)
ALP SERPL-CCNC: 71 IU/L (ref 44–121)
ALT SERPL-CCNC: 14 IU/L (ref 0–32)
APPEARANCE UR: CLEAR
AST SERPL-CCNC: 20 IU/L (ref 0–40)
BACTERIA #/AREA URNS HPF: NORMAL /[HPF]
BASOPHILS # BLD AUTO: 0 X10E3/UL (ref 0–0.2)
BASOPHILS NFR BLD AUTO: 0 %
BILIRUB SERPL-MCNC: 0.4 MG/DL (ref 0–1.2)
BILIRUB UR QL STRIP: NEGATIVE
BUN SERPL-MCNC: 14 MG/DL (ref 8–27)
BUN/CREAT SERPL: 19 (ref 12–28)
CALCIUM SERPL-MCNC: 9.4 MG/DL (ref 8.7–10.3)
CASTS URNS QL MICRO: NORMAL /LPF
CHLORIDE SERPL-SCNC: 101 MMOL/L (ref 96–106)
CO2 SERPL-SCNC: 22 MMOL/L (ref 20–29)
COLOR UR: YELLOW
CREAT SERPL-MCNC: 0.72 MG/DL (ref 0.57–1)
EGFRCR SERPLBLD CKD-EPI 2021: 91 ML/MIN/1.73
EOSINOPHIL # BLD AUTO: 0.1 X10E3/UL (ref 0–0.4)
EOSINOPHIL NFR BLD AUTO: 2 %
EPI CELLS #/AREA URNS HPF: NORMAL /HPF (ref 0–10)
ERYTHROCYTE [DISTWIDTH] IN BLOOD BY AUTOMATED COUNT: 12.2 % (ref 11.7–15.4)
GLOBULIN SER CALC-MCNC: 2.5 G/DL (ref 1.5–4.5)
GLUCOSE SERPL-MCNC: 79 MG/DL (ref 70–99)
GLUCOSE UR QL STRIP: NEGATIVE
HBA1C MFR BLD: 5.7 % (ref 4.8–5.6)
HCT VFR BLD AUTO: 38.1 % (ref 34–46.6)
HGB BLD-MCNC: 12.7 G/DL (ref 11.1–15.9)
HGB UR QL STRIP: NEGATIVE
IMM GRANULOCYTES # BLD AUTO: 0 X10E3/UL (ref 0–0.1)
IMM GRANULOCYTES NFR BLD AUTO: 0 %
KETONES UR QL STRIP: NEGATIVE
LEUKOCYTE ESTERASE UR QL STRIP: NEGATIVE
LYMPHOCYTES # BLD AUTO: 3.5 X10E3/UL (ref 0.7–3.1)
LYMPHOCYTES NFR BLD AUTO: 55 %
MCH RBC QN AUTO: 30 PG (ref 26.6–33)
MCHC RBC AUTO-ENTMCNC: 33.3 G/DL (ref 31.5–35.7)
MCV RBC AUTO: 90 FL (ref 79–97)
MICRO URNS: NORMAL
MICRO URNS: NORMAL
MONOCYTES # BLD AUTO: 0.5 X10E3/UL (ref 0.1–0.9)
MONOCYTES NFR BLD AUTO: 7 %
MUCOUS THREADS URNS QL MICRO: PRESENT
NEUTROPHILS # BLD AUTO: 2.2 X10E3/UL (ref 1.4–7)
NEUTROPHILS NFR BLD AUTO: 36 %
NITRITE UR QL STRIP: NEGATIVE
PH UR STRIP: 5 [PH] (ref 5–7.5)
PLATELET # BLD AUTO: 343 X10E3/UL (ref 150–450)
POTASSIUM SERPL-SCNC: 4.4 MMOL/L (ref 3.5–5.2)
PROT SERPL-MCNC: 6.9 G/DL (ref 6–8.5)
PROT UR QL STRIP: NEGATIVE
RBC # BLD AUTO: 4.24 X10E6/UL (ref 3.77–5.28)
RBC #/AREA URNS HPF: NORMAL /HPF (ref 0–2)
SODIUM SERPL-SCNC: 137 MMOL/L (ref 134–144)
SP GR UR STRIP: 1.02 (ref 1–1.03)
URINALYSIS REFLEX: NORMAL
UROBILINOGEN UR STRIP-MCNC: 0.2 MG/DL (ref 0.2–1)
WBC # BLD AUTO: 6.3 X10E3/UL (ref 3.4–10.8)
WBC #/AREA URNS HPF: NORMAL /HPF (ref 0–5)

## 2025-03-21 ENCOUNTER — TELEMEDICINE (OUTPATIENT)
Dept: INTERNAL MEDICINE | Facility: CLINIC | Age: 70
End: 2025-03-21
Payer: MEDICARE

## 2025-03-21 DIAGNOSIS — N20.0 NEPHROLITHIASIS: ICD-10-CM

## 2025-03-21 DIAGNOSIS — I25.10 CORONARY ARTERY DISEASE INVOLVING NATIVE CORONARY ARTERY OF NATIVE HEART WITHOUT ANGINA PECTORIS: ICD-10-CM

## 2025-03-21 DIAGNOSIS — Z71.85 VACCINE COUNSELING: ICD-10-CM

## 2025-03-21 DIAGNOSIS — R73.03 PRE-DIABETES: ICD-10-CM

## 2025-03-21 DIAGNOSIS — I10 ESSENTIAL HYPERTENSION: Primary | ICD-10-CM

## 2025-03-21 DIAGNOSIS — E78.2 MIXED HYPERLIPIDEMIA: ICD-10-CM

## 2025-03-21 RX ORDER — LOSARTAN POTASSIUM 50 MG/1
50 TABLET ORAL DAILY
Qty: 90 TABLET | Refills: 2 | Status: SHIPPED | OUTPATIENT
Start: 2025-03-21

## 2025-03-21 NOTE — PROGRESS NOTES
"Hypertension and Blood Sugar Problem      HPI  Bernice Dunlap is a 69 y.o. female presents in f/u via video link in Epic.     Pt at home in [place], [place] during visit  MD in office in [place], [place] during visit.     You have chosen to receive care through a telephone visit. Do you consent to use a telephone visit for your medical care today? Yes    Health has been 'pretty good'.  Not seen since 5/2024.      1. L sided nephrolithiasis; recall,  new issue 7/2018 in ED at [facility] with retained 5mm stone on CT 2/2019 on L and  2/2020 CT  - recurrent events with sx, planning for removal in 4/2024, seeing urology in [place].   2. HTN; new dx of LANDON (mod on R and mild on L) on CT angio in ED 11/2023 - \"great\".  Checking 'daily'.  Getting ~120/60.    3. Pre-DM - diet 'is doing OK. I am trying to change my diet'. 'Better than what it was. Gotten away from junk'.   weight is stable, remains active. Trend A1C today.    4. Insomnia - taking Trazodone at night, maybe every other night, uses when night priro was not good.   5. ASCVD s/p CABG in 2008 and (-) stress in 2016 and 11/2018; inpt admit 10/2019 with (+) stress test and new mid RCA lesion  S/p PTCA x1 -  No sx noted. Feeling well. Sx \"none at all\".  Walking and jointed gym, feels well when there. Saw Cards in 11/2204.   6. HM - had Flu/ COVID in  2024. Not had RSV.  Planning to get Shingrix.       Review of Systems   Constitutional: Negative for chills and fever.   Cardiovascular:  Negative for chest pain, dyspnea on exertion, irregular heartbeat, near-syncope, palpitations and syncope.   Respiratory:  Negative for shortness of breath.    Hematologic/Lymphatic: Negative for bleeding problem.   Gastrointestinal:  Negative for nausea and vomiting.   Neurological:  Negative for dizziness and light-headedness.       The following portions of the patient's history were reviewed and updated as appropriate: allergies, current medications, past medical history, past social " history, and problem list.      Current Outpatient Medications:     losartan (COZAAR) 50 MG tablet, Take 1 tablet by mouth Daily., Disp: 90 tablet, Rfl: 2    amLODIPine (NORVASC) 5 MG tablet, Take 1 tablet by mouth Daily., Disp: 90 tablet, Rfl: 3    buPROPion SR (WELLBUTRIN SR) 150 MG 12 hr tablet, Take 1 tablet by mouth 2 (Two) Times a Day., Disp: 180 tablet, Rfl: 0    Cholecalciferol (HM VITAMIN D3) 2000 units capsule, Take 2,000 Units by mouth Daily., Disp: 30 each, Rfl:     clopidogrel (PLAVIX) 75 MG tablet, Take 1 tablet by mouth Daily., Disp: 90 tablet, Rfl: 2    ezetimibe (ZETIA) 10 MG tablet, Take 1 tablet by mouth Daily., Disp: 90 tablet, Rfl: 2    metoprolol succinate XL (TOPROL-XL) 50 MG 24 hr tablet, Take 1 tablet by mouth Daily., Disp: 90 tablet, Rfl: 1    nitroglycerin (NITROSTAT) 0.4 MG SL tablet, DISSOLVE 1 TABLET UNDER TONGUE EVERY 5 MIN AS NEEDED FOR CHEST PAIN. limit A TOTAL OF 3 DOSES IN 15 MIN., Disp: , Rfl:     omeprazole (priLOSEC) 20 MG capsule, Take 1 capsule by mouth Daily., Disp: , Rfl:     rosuvastatin (CRESTOR) 40 MG tablet, Take 1 tablet by mouth Daily., Disp: 90 tablet, Rfl: 2    traZODone (DESYREL) 50 MG tablet, Take 1 tablet by mouth every night at bedtime., Disp: 90 tablet, Rfl: 2    There were no vitals filed for this visit.  There is no height or weight on file to calculate BMI.      Physical Exam  Vitals reviewed.   Constitutional:       General: She is not in acute distress.     Appearance: She is well-developed. She is not ill-appearing or toxic-appearing.   HENT:      Head: Normocephalic.   Pulmonary:      Effort: Pulmonary effort is normal. No respiratory distress.   Neurological:      Mental Status: She is alert and oriented to person, place, and time.      Cranial Nerves: No dysarthria or facial asymmetry.   Psychiatric:         Mood and Affect: Mood normal.         Behavior: Behavior normal.         Thought Content: Thought content normal.         Assessment/  "Plan  Diagnoses and all orders for this visit:    Essential hypertension    Coronary artery disease involving native coronary artery of native heart without angina pectoris    Mixed hyperlipidemia    Pre-diabetes    Nephrolithiasis    Vaccine counseling    Other orders  -     losartan (COZAAR) 50 MG tablet; Take 1 tablet by mouth Daily.        Return in about 4 months (around 7/21/2025).      Discussion:  Bernice Dunlap is a 69 y.o. female RTC in f/u:   1. L sided nephrolithiasis; recall,  new issue 7/2018 in ED at [facility] with retained 5mm stone on CT 2/2019 on L and  2/2020 CT  - recurrent events with sx, now managed with urology in [facility].  Plans for lithotripsy for 2024. U/A clear on labs.   2. HTN; new dx of LANDON (mod on R and mild on L) on CT angio in ED 11/2023 - controlled on three drugs. Good home log. BMP OK, stable Cr.  3. Pre-DM - A1C with minimal elevation, normal FBS.  Suggest patient dietary indiscretions with simple carbs, and patient recommit to minimizing \"the junk\" in diet.  Augment exercise with recent joining of gym. Trend.   4. Insomnia - Trazodone at bedtime as needed, often when prior at night sleep poor.   5. ASCVD s/p CABG in 2008 and (-) stress in 2016 and 11/2018; inpt admit 10/2019 with (+) stress test and new mid RCA lesion  S/p PTCA x1 - UTD Cards 11/2204, note reviewed, no changes.   --> HLD -on statin and ezetimibe at this time.  Trend lipids with annual visit labs in 4 months.  Will include lipoprotein a and apolipoprotein B, per cardiology request  6. HM - Flu/ COVID UTD 9/2024; RSV vaccine advised reviewed; Shingrix advised at pharmacy, reviewed.      RTC 4 months CPE/AWV, F labs prior (include ipoprotein a and apolipoprotein B)    Total time of visit~18 minutes  "

## 2025-05-28 NOTE — TELEPHONE ENCOUNTER
Caller: [address] [phone] [facility] [phone] FX    Relationship: Pharmacy    Best call back number: [phone]     Requested Prescriptions:   Requested Prescriptions     Pending Prescriptions Disp Refills    amLODIPine (NORVASC) 5 MG tablet 90 tablet 3     Sig: Take 1 tablet by mouth Daily.        Pharmacy where request should be sent: [address] [phone] [facility] [phone] FX  [facility] [address] [phone] [facility] [phone] FX     Last office visit with prescribing clinician: 5/20/2024   Last telemedicine visit with prescribing clinician: 3/21/2025   Next office visit with prescribing clinician: 8/8/2025     Machelle Ibrahim Rep   05/28/25 14:26 EDT

## 2025-05-29 RX ORDER — AMLODIPINE BESYLATE 5 MG/1
5 TABLET ORAL DAILY
Qty: 90 TABLET | Refills: 3 | Status: SHIPPED | OUTPATIENT
Start: 2025-05-29

## 2025-06-05 ENCOUNTER — TELEPHONE (OUTPATIENT)
Dept: INTERNAL MEDICINE | Facility: CLINIC | Age: 70
End: 2025-06-05
Payer: MEDICARE

## 2025-06-05 DIAGNOSIS — Z12.31 ENCOUNTER FOR SCREENING MAMMOGRAM FOR MALIGNANT NEOPLASM OF BREAST: ICD-10-CM

## 2025-06-05 DIAGNOSIS — Z13.820 SCREENING FOR OSTEOPOROSIS: ICD-10-CM

## 2025-06-05 DIAGNOSIS — Z78.0 POSTMENOPAUSAL: Primary | ICD-10-CM

## 2025-06-05 NOTE — TELEPHONE ENCOUNTER
Caller: Bernice Dunlap    Relationship: Self    Best call back number:     [phone]       What orders are you requesting (i.e. lab or imaging): BONE DENSITY, AND MAMMO    In what timeframe would the patient need to come in: ASAP    Where will you receive your lab/imaging services: [facility] FX: [phone]

## 2025-06-09 DIAGNOSIS — Z78.0 POSTMENOPAUSAL: ICD-10-CM

## 2025-06-09 DIAGNOSIS — Z13.820 SCREENING FOR OSTEOPOROSIS: ICD-10-CM

## 2025-06-27 RX ORDER — CLOPIDOGREL BISULFATE 75 MG/1
75 TABLET ORAL DAILY
Qty: 30 TABLET | Refills: 0 | OUTPATIENT
Start: 2025-06-27

## 2025-06-27 NOTE — TELEPHONE ENCOUNTER
Caller: [facility] [address] [facility] [address] - [phone] [facility] [phone] FX    Relationship: Pharmacy    Best call back number: [phone]     Requested Prescriptions:   Requested Prescriptions     Pending Prescriptions Disp Refills    clopidogrel (PLAVIX) 75 MG tablet 90 tablet 2     Sig: Take 1 tablet by mouth Daily.        Pharmacy where [place] be sent[place] [facility] [id number] [facility] [address] - [phone] [facility] [phone] FX     Last office visit with prescribing clinician: 5/20/2024   Last telemedicine visit with prescribing clinician: 3/21/2025   Next office visit with prescribing clinician: 8/8/2025     Does the patient have less than a 3 day supply:  [x] Yes  [] No    Would you like a call back once the refill request has been completed: [] Yes [x] No    If the office needs to give you a call back, can they leave a voicemail: [] Yes [x] No    Machelle Clayton Rep   06/27/25 14:10 EDT

## 2025-06-30 RX ORDER — CLOPIDOGREL BISULFATE 75 MG/1
75 TABLET ORAL DAILY
Qty: 90 TABLET | Refills: 2 | Status: SHIPPED | OUTPATIENT
Start: 2025-06-30

## 2025-07-18 ENCOUNTER — RESULTS FOLLOW-UP (OUTPATIENT)
Dept: INTERNAL MEDICINE | Facility: CLINIC | Age: 70
End: 2025-07-18
Payer: MEDICARE

## 2025-07-21 ENCOUNTER — TELEPHONE (OUTPATIENT)
Dept: INTERNAL MEDICINE | Facility: CLINIC | Age: 70
End: 2025-07-21
Payer: MEDICARE

## 2025-07-21 DIAGNOSIS — I10 ESSENTIAL HYPERTENSION: ICD-10-CM

## 2025-07-21 DIAGNOSIS — E78.2 MIXED HYPERLIPIDEMIA: ICD-10-CM

## 2025-07-21 DIAGNOSIS — M85.89 OSTEOPENIA OF MULTIPLE SITES: ICD-10-CM

## 2025-07-21 DIAGNOSIS — R26.89 LOSS OF BALANCE: ICD-10-CM

## 2025-07-21 DIAGNOSIS — Z13.29 SCREENING FOR THYROID DISORDER: ICD-10-CM

## 2025-07-21 DIAGNOSIS — Z13.1 SCREENING FOR DIABETES MELLITUS (DM): ICD-10-CM

## 2025-07-21 DIAGNOSIS — N39.0 RECURRENT UTI: ICD-10-CM

## 2025-07-21 DIAGNOSIS — E55.9 VITAMIN D DEFICIENCY: ICD-10-CM

## 2025-07-21 DIAGNOSIS — R73.03 PRE-DIABETES: ICD-10-CM

## 2025-07-21 DIAGNOSIS — Z00.00 ENCOUNTER FOR SUBSEQUENT ANNUAL WELLNESS VISIT (AWV) IN MEDICARE PATIENT: Primary | ICD-10-CM

## 2025-07-21 DIAGNOSIS — N81.10 BLADDER PROLAPSE, FEMALE, ACQUIRED: ICD-10-CM

## 2025-07-21 NOTE — TELEPHONE ENCOUNTER
Called and left a voicemail msg for the patient to call us back.     Orders are in the [facility] system, so as long as she goes to a [facility] office they will have access to our orders.

## 2025-07-21 NOTE — TELEPHONE ENCOUNTER
PATIENT CALLED FOR LAB ORDERS FOR HER UPCOMING APPOINTMENT ON 7/28/25   [facility] [place]    SHE IS NEEDING LABS IN CHART     CALL BACK NUMBER [phone]

## 2025-07-21 NOTE — TELEPHONE ENCOUNTER
Okay for GEOFFREY to relay the msg that our orders are already in the [facility] system so if she goes to a [facility] office in [place] they will have access to our orders electronically.

## 2025-07-24 DIAGNOSIS — I70.90 ARTERIOSCLEROTIC VASCULAR DISEASE: ICD-10-CM

## 2025-07-24 DIAGNOSIS — E78.2 MIXED HYPERLIPIDEMIA: ICD-10-CM

## 2025-07-24 RX ORDER — ROSUVASTATIN CALCIUM 40 MG/1
40 TABLET, COATED ORAL DAILY
Qty: 90 TABLET | Refills: 0 | Status: SHIPPED | OUTPATIENT
Start: 2025-07-24

## 2025-07-24 RX ORDER — EZETIMIBE 10 MG/1
10 TABLET ORAL DAILY
Qty: 90 TABLET | Refills: 0 | Status: SHIPPED | OUTPATIENT
Start: 2025-07-24

## 2025-07-29 LAB
25(OH)D3+25(OH)D2 SERPL-MCNC: 31.1 NG/ML (ref 30–100)
ALBUMIN SERPL-MCNC: 4.4 G/DL (ref 3.9–4.9)
ALP SERPL-CCNC: 71 IU/L (ref 44–121)
ALT SERPL-CCNC: 19 IU/L (ref 0–32)
APPEARANCE UR: CLEAR
AST SERPL-CCNC: 25 IU/L (ref 0–40)
BACTERIA #/AREA URNS HPF: NORMAL /[HPF]
BASOPHILS # BLD AUTO: 0 X10E3/UL (ref 0–0.2)
BASOPHILS NFR BLD AUTO: 1 %
BILIRUB SERPL-MCNC: 0.6 MG/DL (ref 0–1.2)
BILIRUB UR QL STRIP: NEGATIVE
BUN SERPL-MCNC: 16 MG/DL (ref 8–27)
BUN/CREAT SERPL: 23 (ref 12–28)
CALCIUM SERPL-MCNC: 9.8 MG/DL (ref 8.7–10.3)
CASTS URNS QL MICRO: NORMAL /LPF
CHLORIDE SERPL-SCNC: 100 MMOL/L (ref 96–106)
CHOLEST SERPL-MCNC: 113 MG/DL (ref 100–199)
CHOLEST/HDLC SERPL: 2.6 RATIO (ref 0–4.4)
CO2 SERPL-SCNC: 21 MMOL/L (ref 20–29)
COLOR UR: YELLOW
CREAT SERPL-MCNC: 0.71 MG/DL (ref 0.57–1)
EGFRCR SERPLBLD CKD-EPI 2021: 92 ML/MIN/1.73
EOSINOPHIL # BLD AUTO: 0.1 X10E3/UL (ref 0–0.4)
EOSINOPHIL NFR BLD AUTO: 2 %
EPI CELLS #/AREA URNS HPF: NORMAL /HPF (ref 0–10)
ERYTHROCYTE [DISTWIDTH] IN BLOOD BY AUTOMATED COUNT: 12.9 % (ref 11.7–15.4)
GLOBULIN SER CALC-MCNC: 2.9 G/DL (ref 1.5–4.5)
GLUCOSE SERPL-MCNC: 81 MG/DL (ref 70–99)
GLUCOSE UR QL STRIP: NEGATIVE
HBA1C MFR BLD: 5.7 % (ref 4.8–5.6)
HCT VFR BLD AUTO: 39.8 % (ref 34–46.6)
HDLC SERPL-MCNC: 43 MG/DL
HGB BLD-MCNC: 12.9 G/DL (ref 11.1–15.9)
HGB UR QL STRIP: NEGATIVE
IMM GRANULOCYTES # BLD AUTO: 0 X10E3/UL (ref 0–0.1)
IMM GRANULOCYTES NFR BLD AUTO: 0 %
KETONES UR QL STRIP: NEGATIVE
LDLC SERPL CALC-MCNC: 55 MG/DL (ref 0–99)
LEUKOCYTE ESTERASE UR QL STRIP: NEGATIVE
LYMPHOCYTES # BLD AUTO: 3 X10E3/UL (ref 0.7–3.1)
LYMPHOCYTES NFR BLD AUTO: 48 %
MCH RBC QN AUTO: 29.9 PG (ref 26.6–33)
MCHC RBC AUTO-ENTMCNC: 32.4 G/DL (ref 31.5–35.7)
MCV RBC AUTO: 92 FL (ref 79–97)
MICRO URNS: ABNORMAL
MONOCYTES # BLD AUTO: 0.5 X10E3/UL (ref 0.1–0.9)
MONOCYTES NFR BLD AUTO: 9 %
MUCOUS THREADS URNS QL MICRO: PRESENT
NEUTROPHILS # BLD AUTO: 2.5 X10E3/UL (ref 1.4–7)
NEUTROPHILS NFR BLD AUTO: 40 %
NITRITE UR QL STRIP: NEGATIVE
PH UR STRIP: 5.5 [PH] (ref 5–7.5)
PLATELET # BLD AUTO: 334 X10E3/UL (ref 150–450)
POTASSIUM SERPL-SCNC: 4.4 MMOL/L (ref 3.5–5.2)
PROT SERPL-MCNC: 7.3 G/DL (ref 6–8.5)
PROT UR QL STRIP: ABNORMAL
RBC # BLD AUTO: 4.32 X10E6/UL (ref 3.77–5.28)
RBC #/AREA URNS HPF: NORMAL /HPF (ref 0–2)
SODIUM SERPL-SCNC: 136 MMOL/L (ref 134–144)
SP GR UR STRIP: 1.03 (ref 1–1.03)
TRIGL SERPL-MCNC: 72 MG/DL (ref 0–149)
TSH SERPL DL<=0.005 MIU/L-ACNC: 1.29 UIU/ML (ref 0.45–4.5)
URINALYSIS REFLEX: ABNORMAL
UROBILINOGEN UR STRIP-MCNC: 0.2 MG/DL (ref 0.2–1)
VLDLC SERPL CALC-MCNC: 15 MG/DL (ref 5–40)
WBC # BLD AUTO: 6.2 X10E3/UL (ref 3.4–10.8)
WBC #/AREA URNS HPF: NORMAL /HPF (ref 0–5)

## 2025-08-08 ENCOUNTER — OFFICE VISIT (OUTPATIENT)
Dept: INTERNAL MEDICINE | Facility: CLINIC | Age: 70
End: 2025-08-08
Payer: MEDICARE

## 2025-08-08 VITALS
HEIGHT: 62 IN | DIASTOLIC BLOOD PRESSURE: 72 MMHG | HEART RATE: 60 BPM | SYSTOLIC BLOOD PRESSURE: 142 MMHG | TEMPERATURE: 98.2 F | WEIGHT: 153.4 LBS | OXYGEN SATURATION: 99 % | BODY MASS INDEX: 28.23 KG/M2

## 2025-08-08 DIAGNOSIS — I70.90 ARTERIOSCLEROTIC VASCULAR DISEASE: Primary | ICD-10-CM

## 2025-08-08 DIAGNOSIS — Z12.11 SCREEN FOR COLON CANCER: ICD-10-CM

## 2025-08-08 DIAGNOSIS — R73.03 PRE-DIABETES: ICD-10-CM

## 2025-08-08 DIAGNOSIS — I25.10 CORONARY ARTERY DISEASE INVOLVING NATIVE CORONARY ARTERY OF NATIVE HEART WITHOUT ANGINA PECTORIS: ICD-10-CM

## 2025-08-08 DIAGNOSIS — Z12.2 SCREENING FOR LUNG CANCER: ICD-10-CM

## 2025-08-08 DIAGNOSIS — M79.7 FIBROMYALGIA: ICD-10-CM

## 2025-08-08 DIAGNOSIS — I65.23 BILATERAL CAROTID ARTERY STENOSIS: ICD-10-CM

## 2025-08-08 DIAGNOSIS — L30.1 VESICULAR PALMOPLANTAR ECZEMA: ICD-10-CM

## 2025-08-08 DIAGNOSIS — E78.2 MIXED HYPERLIPIDEMIA: ICD-10-CM

## 2025-08-08 DIAGNOSIS — Z72.0 TOBACCO USE: ICD-10-CM

## 2025-08-08 DIAGNOSIS — M85.89 OSTEOPENIA OF MULTIPLE SITES: ICD-10-CM

## 2025-08-08 DIAGNOSIS — Z23 NEED FOR VACCINATION AGAINST STREPTOCOCCUS PNEUMONIAE: ICD-10-CM

## 2025-08-08 DIAGNOSIS — F17.210 NICOTINE DEPENDENCE, CIGARETTES, UNCOMPLICATED: ICD-10-CM

## 2025-08-08 DIAGNOSIS — K21.9 GASTRIC REFLUX: ICD-10-CM

## 2025-08-08 DIAGNOSIS — I70.1 RAS (RENAL ARTERY STENOSIS): ICD-10-CM

## 2025-08-08 DIAGNOSIS — E55.9 VITAMIN D DEFICIENCY: ICD-10-CM

## 2025-08-08 DIAGNOSIS — R80.0 ISOLATED PROTEINURIA WITHOUT SPECIFIC MORPHOLOGIC LESION: ICD-10-CM

## 2025-08-08 DIAGNOSIS — Z95.1 S/P CABG X 1: ICD-10-CM

## 2025-08-08 DIAGNOSIS — G43.009 MIGRAINE WITHOUT AURA AND WITHOUT STATUS MIGRAINOSUS, NOT INTRACTABLE: ICD-10-CM

## 2025-08-08 DIAGNOSIS — I10 ESSENTIAL HYPERTENSION: ICD-10-CM

## 2025-08-08 DIAGNOSIS — G47.09 OTHER INSOMNIA: ICD-10-CM

## 2025-08-08 DIAGNOSIS — N20.0 NEPHROLITHIASIS: ICD-10-CM

## 2025-08-08 DIAGNOSIS — Z87.891 PERSONAL HISTORY OF TOBACCO USE, PRESENTING HAZARDS TO HEALTH: ICD-10-CM

## 2025-08-08 RX ORDER — BUPROPION HYDROCHLORIDE 150 MG/1
150 TABLET, EXTENDED RELEASE ORAL 2 TIMES DAILY
Qty: 180 TABLET | Refills: 0 | Status: CANCELLED | OUTPATIENT
Start: 2025-08-08

## 2025-08-08 RX ORDER — TELMISARTAN 80 MG/1
80 TABLET ORAL DAILY
Qty: 90 TABLET | Refills: 2 | Status: SHIPPED | OUTPATIENT
Start: 2025-08-08

## 2025-08-08 RX ORDER — TRAZODONE HYDROCHLORIDE 50 MG/1
50 TABLET ORAL
Start: 2025-08-08